# Patient Record
Sex: MALE | Race: WHITE | NOT HISPANIC OR LATINO
[De-identification: names, ages, dates, MRNs, and addresses within clinical notes are randomized per-mention and may not be internally consistent; named-entity substitution may affect disease eponyms.]

---

## 2017-05-02 ENCOUNTER — APPOINTMENT (OUTPATIENT)
Dept: GASTROENTEROLOGY | Facility: CLINIC | Age: 82
End: 2017-05-02

## 2017-05-02 VITALS
HEART RATE: 52 BPM | SYSTOLIC BLOOD PRESSURE: 137 MMHG | DIASTOLIC BLOOD PRESSURE: 57 MMHG | BODY MASS INDEX: 26.84 KG/M2 | RESPIRATION RATE: 16 BRPM | WEIGHT: 196 LBS | HEIGHT: 71.5 IN

## 2017-05-02 DIAGNOSIS — Z86.39 PERSONAL HISTORY OF OTHER ENDOCRINE, NUTRITIONAL AND METABOLIC DISEASE: ICD-10-CM

## 2017-05-02 DIAGNOSIS — Z87.39 PERSONAL HISTORY OF OTHER DISEASES OF THE MUSCULOSKELETAL SYSTEM AND CONNECTIVE TISSUE: ICD-10-CM

## 2017-05-02 DIAGNOSIS — A04.7 ENTEROCOLITIS DUE TO CLOSTRIDIUM DIFFICILE: ICD-10-CM

## 2017-05-02 DIAGNOSIS — K64.8 OTHER HEMORRHOIDS: ICD-10-CM

## 2017-05-02 DIAGNOSIS — I25.10 ATHEROSCLEROTIC HEART DISEASE OF NATIVE CORONARY ARTERY W/OUT ANGINA PECTORIS: ICD-10-CM

## 2017-05-02 DIAGNOSIS — Z86.79 PERSONAL HISTORY OF OTHER DISEASES OF THE CIRCULATORY SYSTEM: ICD-10-CM

## 2017-05-02 DIAGNOSIS — Z98.61 CORONARY ANGIOPLASTY STATUS: ICD-10-CM

## 2017-05-02 DIAGNOSIS — N40.0 BENIGN PROSTATIC HYPERPLASIA WITHOUT LOWER URINARY TRACT SYMPMS: ICD-10-CM

## 2017-05-02 RX ORDER — ISOSORBIDE MONONITRATE 60 MG/1
60 TABLET, EXTENDED RELEASE ORAL
Qty: 90 | Refills: 0 | Status: ACTIVE | COMMUNITY
Start: 2017-04-04

## 2017-05-02 RX ORDER — LEVOTHYROXINE SODIUM 75 UG/1
75 TABLET ORAL
Qty: 90 | Refills: 0 | Status: ACTIVE | COMMUNITY
Start: 2016-09-01

## 2017-05-02 RX ORDER — POLYETHYLENE GLYOCOL 3350, SODIUM CHLORIDE, SODIUM BICARBONATE AND POTASSIUM CHLORIDE 420; 11.2; 5.72; 1.48 G/4L; G/4L; G/4L; G/4L
420 POWDER, FOR SOLUTION NASOGASTRIC; ORAL
Qty: 1 | Refills: 0 | Status: ACTIVE | COMMUNITY
Start: 2017-05-02 | End: 1900-01-01

## 2017-05-02 RX ORDER — CLOPIDOGREL BISULFATE 75 MG/1
75 TABLET, FILM COATED ORAL
Qty: 90 | Refills: 0 | Status: ACTIVE | COMMUNITY
Start: 2016-11-21

## 2017-05-02 RX ORDER — OMEPRAZOLE 20 MG/1
20 CAPSULE, DELAYED RELEASE ORAL
Qty: 90 | Refills: 0 | Status: ACTIVE | COMMUNITY
Start: 2017-01-02

## 2017-05-02 RX ORDER — SIMVASTATIN 20 MG/1
20 TABLET, FILM COATED ORAL
Qty: 90 | Refills: 0 | Status: ACTIVE | COMMUNITY
Start: 2016-09-01

## 2017-05-02 RX ORDER — DONEPEZIL HYDROCHLORIDE 10 MG/1
10 TABLET ORAL
Qty: 90 | Refills: 0 | Status: ACTIVE | COMMUNITY
Start: 2017-01-02

## 2017-05-02 RX ORDER — TERAZOSIN 5 MG/1
5 CAPSULE ORAL
Qty: 90 | Refills: 0 | Status: ACTIVE | COMMUNITY
Start: 2017-02-18

## 2017-05-02 RX ORDER — RAMIPRIL 5 MG/1
5 CAPSULE ORAL
Qty: 90 | Refills: 0 | Status: ACTIVE | COMMUNITY
Start: 2016-11-21

## 2017-06-05 ENCOUNTER — OUTPATIENT (OUTPATIENT)
Dept: OUTPATIENT SERVICES | Facility: HOSPITAL | Age: 82
LOS: 1 days | End: 2017-06-05
Payer: MEDICARE

## 2017-06-05 DIAGNOSIS — I25.9 CHRONIC ISCHEMIC HEART DISEASE, UNSPECIFIED: Chronic | ICD-10-CM

## 2017-06-05 DIAGNOSIS — Z41.9 ENCOUNTER FOR PROCEDURE FOR PURPOSES OTHER THAN REMEDYING HEALTH STATE, UNSPECIFIED: Chronic | ICD-10-CM

## 2017-06-05 DIAGNOSIS — K92.1 MELENA: ICD-10-CM

## 2017-06-05 DIAGNOSIS — Z98.89 OTHER SPECIFIED POSTPROCEDURAL STATES: Chronic | ICD-10-CM

## 2017-06-05 DIAGNOSIS — Z87.2 PERSONAL HISTORY OF DISEASES OF THE SKIN AND SUBCUTANEOUS TISSUE: Chronic | ICD-10-CM

## 2017-06-05 PROCEDURE — 71020: CPT | Mod: 26

## 2017-06-20 ENCOUNTER — OUTPATIENT (OUTPATIENT)
Dept: OUTPATIENT SERVICES | Facility: HOSPITAL | Age: 82
LOS: 1 days | End: 2017-06-20
Payer: MEDICARE

## 2017-06-20 VITALS
SYSTOLIC BLOOD PRESSURE: 130 MMHG | WEIGHT: 202.16 LBS | TEMPERATURE: 98 F | HEART RATE: 44 BPM | RESPIRATION RATE: 16 BRPM | HEIGHT: 71 IN | DIASTOLIC BLOOD PRESSURE: 61 MMHG

## 2017-06-20 DIAGNOSIS — Z98.89 OTHER SPECIFIED POSTPROCEDURAL STATES: Chronic | ICD-10-CM

## 2017-06-20 DIAGNOSIS — K92.1 MELENA: ICD-10-CM

## 2017-06-20 DIAGNOSIS — I25.10 ATHEROSCLEROTIC HEART DISEASE OF NATIVE CORONARY ARTERY WITHOUT ANGINA PECTORIS: ICD-10-CM

## 2017-06-20 DIAGNOSIS — N40.0 BENIGN PROSTATIC HYPERPLASIA WITHOUT LOWER URINARY TRACT SYMPTOMS: ICD-10-CM

## 2017-06-20 DIAGNOSIS — E03.9 HYPOTHYROIDISM, UNSPECIFIED: ICD-10-CM

## 2017-06-20 DIAGNOSIS — Z87.2 PERSONAL HISTORY OF DISEASES OF THE SKIN AND SUBCUTANEOUS TISSUE: Chronic | ICD-10-CM

## 2017-06-20 DIAGNOSIS — Z41.9 ENCOUNTER FOR PROCEDURE FOR PURPOSES OTHER THAN REMEDYING HEALTH STATE, UNSPECIFIED: Chronic | ICD-10-CM

## 2017-06-20 DIAGNOSIS — K21.9 GASTRO-ESOPHAGEAL REFLUX DISEASE WITHOUT ESOPHAGITIS: ICD-10-CM

## 2017-06-20 DIAGNOSIS — I25.9 CHRONIC ISCHEMIC HEART DISEASE, UNSPECIFIED: Chronic | ICD-10-CM

## 2017-06-20 DIAGNOSIS — Z01.818 ENCOUNTER FOR OTHER PREPROCEDURAL EXAMINATION: ICD-10-CM

## 2017-06-20 LAB
ANION GAP SERPL CALC-SCNC: 13 MMOL/L — SIGNIFICANT CHANGE UP (ref 5–17)
APTT BLD: 28.1 SEC — SIGNIFICANT CHANGE UP (ref 27.5–37.4)
BUN SERPL-MCNC: 23 MG/DL — HIGH (ref 8–20)
CALCIUM SERPL-MCNC: 8.6 MG/DL — SIGNIFICANT CHANGE UP (ref 8.6–10.2)
CHLORIDE SERPL-SCNC: 104 MMOL/L — SIGNIFICANT CHANGE UP (ref 98–107)
CO2 SERPL-SCNC: 22 MMOL/L — SIGNIFICANT CHANGE UP (ref 22–29)
CREAT SERPL-MCNC: 0.98 MG/DL — SIGNIFICANT CHANGE UP (ref 0.5–1.3)
GLUCOSE SERPL-MCNC: 94 MG/DL — SIGNIFICANT CHANGE UP (ref 70–115)
HCT VFR BLD CALC: 37.4 % — LOW (ref 42–52)
HGB BLD-MCNC: 12.2 G/DL — LOW (ref 14–18)
INR BLD: 1.09 RATIO — SIGNIFICANT CHANGE UP (ref 0.88–1.16)
MCHC RBC-ENTMCNC: 30.7 PG — SIGNIFICANT CHANGE UP (ref 27–31)
MCHC RBC-ENTMCNC: 32.6 G/DL — SIGNIFICANT CHANGE UP (ref 32–36)
MCV RBC AUTO: 94.2 FL — HIGH (ref 80–94)
PLATELET # BLD AUTO: 135 K/UL — LOW (ref 150–400)
POTASSIUM SERPL-MCNC: 4.6 MMOL/L — SIGNIFICANT CHANGE UP (ref 3.5–5.3)
POTASSIUM SERPL-SCNC: 4.6 MMOL/L — SIGNIFICANT CHANGE UP (ref 3.5–5.3)
PROTHROM AB SERPL-ACNC: 12 SEC — SIGNIFICANT CHANGE UP (ref 9.8–12.7)
RBC # BLD: 3.97 M/UL — LOW (ref 4.6–6.2)
RBC # FLD: 13.5 % — SIGNIFICANT CHANGE UP (ref 11–15.6)
SODIUM SERPL-SCNC: 139 MMOL/L — SIGNIFICANT CHANGE UP (ref 135–145)
WBC # BLD: 7.2 K/UL — SIGNIFICANT CHANGE UP (ref 4.8–10.8)
WBC # FLD AUTO: 7.2 K/UL — SIGNIFICANT CHANGE UP (ref 4.8–10.8)

## 2017-06-20 PROCEDURE — 80048 BASIC METABOLIC PNL TOTAL CA: CPT

## 2017-06-20 PROCEDURE — 85027 COMPLETE CBC AUTOMATED: CPT

## 2017-06-20 PROCEDURE — 85610 PROTHROMBIN TIME: CPT

## 2017-06-20 PROCEDURE — G0463: CPT

## 2017-06-20 PROCEDURE — 85730 THROMBOPLASTIN TIME PARTIAL: CPT

## 2017-06-20 NOTE — H&P PST ADULT - PSH
Coronary artery disease  cardiac stents 2011  H/O cataract removal with insertion of prosthetic lens    H/O pilonidal cyst    H/O: hemorrhoidectomy    S/P appendectomy

## 2017-06-20 NOTE — H&P PST ADULT - ASSESSMENT
medications reviewed, instructions given on what medications to take and what not to take. Asked the patient to take the Blood pressure medication/ heart medication on DOP.

## 2017-06-20 NOTE — H&P PST ADULT - HISTORY OF PRESENT ILLNESS
88 year old male who states that he had a some bleeding with his bowel movement few months ago, was seen by the gastroenterologist in the office now scheduled for Colonoscopy

## 2017-06-20 NOTE — H&P PST ADULT - PMH
Anginal pain    BPH (benign prostatic hypertrophy)    Cataract    Coronary artery disease    Enlarged prostate    Heart attack    Hypothyroidism    Reflux    Stented coronary artery  x1  TIA (transient ischemic attack)

## 2017-06-20 NOTE — H&P PST ADULT - NSANTHOSAYNRD_GEN_A_CORE
No. MARIELA screening performed.  STOP BANG Legend: 0-2 = LOW Risk; 3-4 = INTERMEDIATE Risk; 5-8 = HIGH Risk

## 2017-06-23 ENCOUNTER — OUTPATIENT (OUTPATIENT)
Dept: OUTPATIENT SERVICES | Facility: HOSPITAL | Age: 82
LOS: 1 days | End: 2017-06-23
Payer: MEDICARE

## 2017-06-23 ENCOUNTER — APPOINTMENT (OUTPATIENT)
Dept: GASTROENTEROLOGY | Facility: GI CENTER | Age: 82
End: 2017-06-23

## 2017-06-23 DIAGNOSIS — Z98.89 OTHER SPECIFIED POSTPROCEDURAL STATES: Chronic | ICD-10-CM

## 2017-06-23 DIAGNOSIS — K92.1 MELENA: ICD-10-CM

## 2017-06-23 DIAGNOSIS — Z41.9 ENCOUNTER FOR PROCEDURE FOR PURPOSES OTHER THAN REMEDYING HEALTH STATE, UNSPECIFIED: Chronic | ICD-10-CM

## 2017-06-23 DIAGNOSIS — K64.8 OTHER HEMORRHOIDS: ICD-10-CM

## 2017-06-23 DIAGNOSIS — Z87.2 PERSONAL HISTORY OF DISEASES OF THE SKIN AND SUBCUTANEOUS TISSUE: Chronic | ICD-10-CM

## 2017-06-23 DIAGNOSIS — I25.9 CHRONIC ISCHEMIC HEART DISEASE, UNSPECIFIED: Chronic | ICD-10-CM

## 2017-06-23 DIAGNOSIS — Z86.010 PERSONAL HISTORY OF COLONIC POLYPS: ICD-10-CM

## 2017-06-23 DIAGNOSIS — K57.31 DIVERTICULOSIS OF LARGE INTESTINE W/OUT PERFORATION OR ABSCESS WITH BLEEDING: ICD-10-CM

## 2017-06-23 PROCEDURE — 45378 DIAGNOSTIC COLONOSCOPY: CPT

## 2017-08-10 ENCOUNTER — APPOINTMENT (OUTPATIENT)
Dept: DERMATOLOGY | Facility: CLINIC | Age: 82
End: 2017-08-10
Payer: MEDICARE

## 2017-08-10 PROCEDURE — 99213 OFFICE O/P EST LOW 20 MIN: CPT | Mod: 25

## 2017-08-10 PROCEDURE — 17003 DESTRUCT PREMALG LES 2-14: CPT

## 2017-08-10 PROCEDURE — 17000 DESTRUCT PREMALG LESION: CPT

## 2018-02-13 ENCOUNTER — APPOINTMENT (OUTPATIENT)
Dept: DERMATOLOGY | Facility: CLINIC | Age: 83
End: 2018-02-13
Payer: MEDICARE

## 2018-02-13 PROCEDURE — 99214 OFFICE O/P EST MOD 30 MIN: CPT

## 2018-08-21 ENCOUNTER — APPOINTMENT (OUTPATIENT)
Dept: DERMATOLOGY | Facility: CLINIC | Age: 83
End: 2018-08-21

## 2018-08-27 ENCOUNTER — EMERGENCY (EMERGENCY)
Facility: HOSPITAL | Age: 83
LOS: 1 days | Discharge: HOME | End: 2018-08-27
Attending: EMERGENCY MEDICINE
Payer: MEDICARE

## 2018-08-27 VITALS — HEIGHT: 72 IN | WEIGHT: 184.97 LBS

## 2018-08-27 VITALS
HEART RATE: 79 BPM | OXYGEN SATURATION: 96 % | SYSTOLIC BLOOD PRESSURE: 132 MMHG | DIASTOLIC BLOOD PRESSURE: 62 MMHG | TEMPERATURE: 98 F | RESPIRATION RATE: 18 BRPM

## 2018-08-27 DIAGNOSIS — I25.9 CHRONIC ISCHEMIC HEART DISEASE, UNSPECIFIED: Chronic | ICD-10-CM

## 2018-08-27 DIAGNOSIS — Z41.9 ENCOUNTER FOR PROCEDURE FOR PURPOSES OTHER THAN REMEDYING HEALTH STATE, UNSPECIFIED: Chronic | ICD-10-CM

## 2018-08-27 DIAGNOSIS — Z98.89 OTHER SPECIFIED POSTPROCEDURAL STATES: Chronic | ICD-10-CM

## 2018-08-27 DIAGNOSIS — Z87.2 PERSONAL HISTORY OF DISEASES OF THE SKIN AND SUBCUTANEOUS TISSUE: Chronic | ICD-10-CM

## 2018-08-27 LAB
ALBUMIN SERPL ELPH-MCNC: 3.4 G/DL — SIGNIFICANT CHANGE UP (ref 3.3–5.2)
ALP SERPL-CCNC: 61 U/L — SIGNIFICANT CHANGE UP (ref 40–120)
ALT FLD-CCNC: <5 U/L — SIGNIFICANT CHANGE UP
ANION GAP SERPL CALC-SCNC: 13 MMOL/L — SIGNIFICANT CHANGE UP (ref 5–17)
APTT BLD: 26.1 SEC — LOW (ref 27.5–37.4)
AST SERPL-CCNC: 15 U/L — SIGNIFICANT CHANGE UP
BASOPHILS # BLD AUTO: 0 K/UL — SIGNIFICANT CHANGE UP (ref 0–0.2)
BASOPHILS NFR BLD AUTO: 0.2 % — SIGNIFICANT CHANGE UP (ref 0–2)
BILIRUB SERPL-MCNC: 0.6 MG/DL — SIGNIFICANT CHANGE UP (ref 0.4–2)
BUN SERPL-MCNC: 21 MG/DL — HIGH (ref 8–20)
CALCIUM SERPL-MCNC: 8.5 MG/DL — LOW (ref 8.6–10.2)
CHLORIDE SERPL-SCNC: 100 MMOL/L — SIGNIFICANT CHANGE UP (ref 98–107)
CK SERPL-CCNC: 26 U/L — LOW (ref 30–200)
CO2 SERPL-SCNC: 23 MMOL/L — SIGNIFICANT CHANGE UP (ref 22–29)
CREAT SERPL-MCNC: 0.9 MG/DL — SIGNIFICANT CHANGE UP (ref 0.5–1.3)
EOSINOPHIL # BLD AUTO: 0.1 K/UL — SIGNIFICANT CHANGE UP (ref 0–0.5)
EOSINOPHIL NFR BLD AUTO: 0.6 % — SIGNIFICANT CHANGE UP (ref 0–6)
GLUCOSE SERPL-MCNC: 148 MG/DL — HIGH (ref 70–115)
HCT VFR BLD CALC: 32.1 % — LOW (ref 42–52)
HGB BLD-MCNC: 10.6 G/DL — LOW (ref 14–18)
INR BLD: 1.19 RATIO — HIGH (ref 0.88–1.16)
LYMPHOCYTES # BLD AUTO: 1.2 K/UL — SIGNIFICANT CHANGE UP (ref 1–4.8)
LYMPHOCYTES # BLD AUTO: 8.4 % — LOW (ref 20–55)
MCHC RBC-ENTMCNC: 31.4 PG — HIGH (ref 27–31)
MCHC RBC-ENTMCNC: 33 G/DL — SIGNIFICANT CHANGE UP (ref 32–36)
MCV RBC AUTO: 95 FL — HIGH (ref 80–94)
MONOCYTES # BLD AUTO: 1 K/UL — HIGH (ref 0–0.8)
MONOCYTES NFR BLD AUTO: 6.9 % — SIGNIFICANT CHANGE UP (ref 3–10)
NEUTROPHILS # BLD AUTO: 12.5 K/UL — HIGH (ref 1.8–8)
NEUTROPHILS NFR BLD AUTO: 83.6 % — HIGH (ref 37–73)
PLATELET # BLD AUTO: 270 K/UL — SIGNIFICANT CHANGE UP (ref 150–400)
POTASSIUM SERPL-MCNC: 4.2 MMOL/L — SIGNIFICANT CHANGE UP (ref 3.5–5.3)
POTASSIUM SERPL-SCNC: 4.2 MMOL/L — SIGNIFICANT CHANGE UP (ref 3.5–5.3)
PROT SERPL-MCNC: 6.4 G/DL — LOW (ref 6.6–8.7)
PROTHROM AB SERPL-ACNC: 13.1 SEC — HIGH (ref 9.8–12.7)
RBC # BLD: 3.38 M/UL — LOW (ref 4.6–6.2)
RBC # FLD: 13.6 % — SIGNIFICANT CHANGE UP (ref 11–15.6)
SODIUM SERPL-SCNC: 136 MMOL/L — SIGNIFICANT CHANGE UP (ref 135–145)
TROPONIN T SERPL-MCNC: <0.01 NG/ML — SIGNIFICANT CHANGE UP (ref 0–0.06)
WBC # BLD: 14.9 K/UL — HIGH (ref 4.8–10.8)
WBC # FLD AUTO: 14.9 K/UL — HIGH (ref 4.8–10.8)

## 2018-08-27 PROCEDURE — 71275 CT ANGIOGRAPHY CHEST: CPT

## 2018-08-27 PROCEDURE — 36415 COLL VENOUS BLD VENIPUNCTURE: CPT

## 2018-08-27 PROCEDURE — 99285 EMERGENCY DEPT VISIT HI MDM: CPT

## 2018-08-27 PROCEDURE — 99284 EMERGENCY DEPT VISIT MOD MDM: CPT | Mod: 25

## 2018-08-27 PROCEDURE — 71045 X-RAY EXAM CHEST 1 VIEW: CPT

## 2018-08-27 PROCEDURE — 85730 THROMBOPLASTIN TIME PARTIAL: CPT

## 2018-08-27 PROCEDURE — 84484 ASSAY OF TROPONIN QUANT: CPT

## 2018-08-27 PROCEDURE — 93005 ELECTROCARDIOGRAM TRACING: CPT

## 2018-08-27 PROCEDURE — 85027 COMPLETE CBC AUTOMATED: CPT

## 2018-08-27 PROCEDURE — 85610 PROTHROMBIN TIME: CPT

## 2018-08-27 PROCEDURE — 82550 ASSAY OF CK (CPK): CPT

## 2018-08-27 PROCEDURE — 71275 CT ANGIOGRAPHY CHEST: CPT | Mod: 26

## 2018-08-27 PROCEDURE — 71045 X-RAY EXAM CHEST 1 VIEW: CPT | Mod: 26

## 2018-08-27 PROCEDURE — 80053 COMPREHEN METABOLIC PANEL: CPT

## 2018-08-27 PROCEDURE — 93010 ELECTROCARDIOGRAM REPORT: CPT

## 2018-08-27 RX ORDER — DONEPEZIL HYDROCHLORIDE 10 MG/1
1 TABLET, FILM COATED ORAL
Qty: 0 | Refills: 0 | COMMUNITY

## 2018-08-27 NOTE — ED ADULT TRIAGE NOTE - CHIEF COMPLAINT QUOTE
pt daughter report pt had a pacemaker placed last week at [facility]. pt c/o right sided CP for past 2 days.

## 2018-08-27 NOTE — ED ADULT NURSE NOTE - PSH
Coronary artery disease  cardiac stents 2011  Elective surgery  pt had bladder mass removed apporoximayly 6 weeks ago by Dr. Mccord  H/O cataract removal with insertion of prosthetic lens    H/O pilonidal cyst    H/O: hemorrhoidectomy    S/P appendectomy

## 2018-08-27 NOTE — ED PROVIDER NOTE - OBJECTIVE STATEMENT
A 89 year old male pt s/p AICD placement at [facility] 1 week ago presents to the ED c/o CP. Pt states that for the past two days he has had right sided CP with breathing. He has not had similar pain in the past and has no prior hx of DVT or PE. Dr. Connors is his cardiologist and Dr. Espinal is his PMD. denies fever. denies HA or neck pain. no sob. no abd pain. no n/v/d. no urinary f/u/d. no back pain. no motor or sensory deficits. denies illicit drug use. no recent travel. no rash. no other acute issues symptoms or concerns

## 2018-08-27 NOTE — ED ADULT NURSE NOTE - OBJECTIVE STATEMENT
Pt states "I have chest pain when I exert myself, I'm 89 years old so everything is exerting for me", pain x 1 day, pt AOx4, denies radiation of pain, denies n/v, recently had pacemaker placed a couple weeks ago w/out complication, resp even and unlabored, denies syncope/weakness

## 2018-08-27 NOTE — ED ADULT NURSE NOTE - CHIEF COMPLAINT QUOTE
pt daughter report pt had a pacemaker placed last week at [facility]. pt c/o right sided CP for past 2 days.

## 2018-08-27 NOTE — ED ADULT NURSE NOTE - NSIMPLEMENTINTERV_GEN_ALL_ED
Implemented All Universal Safety Interventions:  [place] to call system. Call bell, personal items and telephone within reach. Instruct patient to call for assistance. Room bathroom lighting operational. Non-slip footwear when patient is off stretcher. Physically safe environment: no spills, clutter or unnecessary equipment. Stretcher in lowest position, wheels locked, appropriate side rails in place.

## 2018-08-28 PROBLEM — E03.9 HYPOTHYROIDISM, UNSPECIFIED: Chronic | Status: ACTIVE | Noted: 2017-06-20

## 2019-03-27 ENCOUNTER — EMERGENCY (EMERGENCY)
Facility: HOSPITAL | Age: 84
LOS: 1 days | Discharge: HOME | End: 2019-03-27
Attending: EMERGENCY MEDICINE
Payer: MEDICARE

## 2019-03-27 VITALS
DIASTOLIC BLOOD PRESSURE: 78 MMHG | RESPIRATION RATE: 18 BRPM | SYSTOLIC BLOOD PRESSURE: 110 MMHG | WEIGHT: 160.06 LBS | OXYGEN SATURATION: 99 % | HEART RATE: 78 BPM | TEMPERATURE: 98 F

## 2019-03-27 VITALS
DIASTOLIC BLOOD PRESSURE: 83 MMHG | SYSTOLIC BLOOD PRESSURE: 147 MMHG | RESPIRATION RATE: 18 BRPM | HEART RATE: 99 BPM | OXYGEN SATURATION: 99 %

## 2019-03-27 DIAGNOSIS — Z41.9 ENCOUNTER FOR PROCEDURE FOR PURPOSES OTHER THAN REMEDYING HEALTH STATE, UNSPECIFIED: Chronic | ICD-10-CM

## 2019-03-27 DIAGNOSIS — I25.9 CHRONIC ISCHEMIC HEART DISEASE, UNSPECIFIED: Chronic | ICD-10-CM

## 2019-03-27 DIAGNOSIS — Z98.89 OTHER SPECIFIED POSTPROCEDURAL STATES: Chronic | ICD-10-CM

## 2019-03-27 DIAGNOSIS — Z87.2 PERSONAL HISTORY OF DISEASES OF THE SKIN AND SUBCUTANEOUS TISSUE: Chronic | ICD-10-CM

## 2019-03-27 LAB
ANION GAP SERPL CALC-SCNC: 10 MMOL/L — SIGNIFICANT CHANGE UP (ref 5–17)
APTT BLD: 28.3 SEC — SIGNIFICANT CHANGE UP (ref 27.5–36.3)
BUN SERPL-MCNC: 22 MG/DL — HIGH (ref 8–20)
CALCIUM SERPL-MCNC: 8.6 MG/DL — SIGNIFICANT CHANGE UP (ref 8.6–10.2)
CHLORIDE SERPL-SCNC: 108 MMOL/L — HIGH (ref 98–107)
CO2 SERPL-SCNC: 24 MMOL/L — SIGNIFICANT CHANGE UP (ref 22–29)
CREAT SERPL-MCNC: 0.99 MG/DL — SIGNIFICANT CHANGE UP (ref 0.5–1.3)
GLUCOSE SERPL-MCNC: 94 MG/DL — SIGNIFICANT CHANGE UP (ref 70–115)
HCT VFR BLD CALC: 43.1 % — SIGNIFICANT CHANGE UP (ref 42–52)
HGB BLD-MCNC: 14 G/DL — SIGNIFICANT CHANGE UP (ref 14–18)
INR BLD: 1.03 RATIO — SIGNIFICANT CHANGE UP (ref 0.88–1.16)
MCHC RBC-ENTMCNC: 30.8 PG — SIGNIFICANT CHANGE UP (ref 27–31)
MCHC RBC-ENTMCNC: 32.5 G/DL — SIGNIFICANT CHANGE UP (ref 32–36)
MCV RBC AUTO: 94.7 FL — HIGH (ref 80–94)
PLATELET # BLD AUTO: 127 K/UL — LOW (ref 150–400)
POTASSIUM SERPL-MCNC: 4.7 MMOL/L — SIGNIFICANT CHANGE UP (ref 3.5–5.3)
POTASSIUM SERPL-SCNC: 4.7 MMOL/L — SIGNIFICANT CHANGE UP (ref 3.5–5.3)
PROTHROM AB SERPL-ACNC: 11.8 SEC — SIGNIFICANT CHANGE UP (ref 10–12.9)
RBC # BLD: 4.55 M/UL — LOW (ref 4.6–6.2)
RBC # FLD: 14.4 % — SIGNIFICANT CHANGE UP (ref 11–15.6)
SODIUM SERPL-SCNC: 142 MMOL/L — SIGNIFICANT CHANGE UP (ref 135–145)
WBC # BLD: 8.8 K/UL — SIGNIFICANT CHANGE UP (ref 4.8–10.8)
WBC # FLD AUTO: 8.8 K/UL — SIGNIFICANT CHANGE UP (ref 4.8–10.8)

## 2019-03-27 PROCEDURE — 36415 COLL VENOUS BLD VENIPUNCTURE: CPT

## 2019-03-27 PROCEDURE — 70450 CT HEAD/BRAIN W/O DYE: CPT

## 2019-03-27 PROCEDURE — 85730 THROMBOPLASTIN TIME PARTIAL: CPT

## 2019-03-27 PROCEDURE — 71101 X-RAY EXAM UNILAT RIBS/CHEST: CPT | Mod: 26

## 2019-03-27 PROCEDURE — 80048 BASIC METABOLIC PNL TOTAL CA: CPT

## 2019-03-27 PROCEDURE — 71101 X-RAY EXAM UNILAT RIBS/CHEST: CPT

## 2019-03-27 PROCEDURE — 73030 X-RAY EXAM OF SHOULDER: CPT | Mod: 26,LT

## 2019-03-27 PROCEDURE — 73030 X-RAY EXAM OF SHOULDER: CPT

## 2019-03-27 PROCEDURE — 12051 INTMD RPR FACE/MM 2.5 CM/<: CPT

## 2019-03-27 PROCEDURE — 85610 PROTHROMBIN TIME: CPT

## 2019-03-27 PROCEDURE — 99284 EMERGENCY DEPT VISIT MOD MDM: CPT | Mod: 25

## 2019-03-27 PROCEDURE — 71250 CT THORAX DX C-: CPT | Mod: 26

## 2019-03-27 PROCEDURE — 70450 CT HEAD/BRAIN W/O DYE: CPT | Mod: 26

## 2019-03-27 PROCEDURE — 85027 COMPLETE CBC AUTOMATED: CPT

## 2019-03-27 PROCEDURE — 12011 RPR F/E/E/N/L/M 2.5 CM/<: CPT

## 2019-03-27 PROCEDURE — 71250 CT THORAX DX C-: CPT

## 2019-03-27 NOTE — ED STATDOCS - NEUROLOGICAL, MLM
A&Ox3, sensation is normal and strength is normal, no pronator drift, 5/5  strength, CN II-XII intact

## 2019-03-27 NOTE — ED STATDOCS - MUSCULOSKELETAL, MLM
Moving all extremities spontaneously. no midline tenderness. no deformities noted. posterior L rib tenderness, near 10th rib. L shoulder joint tenderness.

## 2019-03-27 NOTE — ED STATDOCS - CLINICAL SUMMARY MEDICAL DECISION MAKING FREE TEXT BOX
89 y/o M on plavix s/p fall, hitting L-sided of of head, no LOC, no obvious physical findings, other than blood in ear canal, tenderness to shoulder and ribs, investigate with labs, x-ray, and CT, pt agreeable and in no pain at this time.

## 2019-03-27 NOTE — ED ADULT NURSE NOTE - OBJECTIVE STATEMENT
Assumed pt care at 1200. Pt a&ox4 c/o trip and fall yesterday, c/o pain to r upper chest with inspirations, has visible lac to r ear, no acute s/s of respiratory distress noted or reported at this time, will continue to monitor

## 2019-03-27 NOTE — ED STATDOCS - PROGRESS NOTE DETAILS
Results noted and findings d/w pt. Pt seen and evaluated. Pt reports mechanical fall last night hitting left side head and chest. Left ear cleaned with small avulsion of skin to antihelix. Bleeding controlled and Dermabond applied. Pt with left sided rib tenderness to anterior rib 10 and anterior lateral rib 11-12. No crepitus appreciated. XR difficult to eval rib fx. Will CT Chest for further evaluation. Abd soft NT/ND/NG. No cspine, tspine or lspine ttp. Will continue to observe

## 2019-03-27 NOTE — ED STATDOCS - ENMT, MLM
Airway patent. Nasal mucosa clear.  Mouth with normal mucosa.  bleeding inside L ear canal.  Neck supple, FROM. Airway patent. Nasal mucosa clear.  Mouth with normal mucosa.  bleeding inside L ear canal, L ear drum with blood, dried blood to outer ear, no active bleeding, non tender..  Neck supple, FROM.

## 2019-03-27 NOTE — ED STATDOCS - CARE PLAN
Principal Discharge DX:	Rib fractures  Secondary Diagnosis:	Laceration of ear  Secondary Diagnosis:	Head contusion

## 2019-03-27 NOTE — ED STATDOCS - ATTENDING CONTRIBUTION TO CARE
I, Annalee Zhang, performed the initial face to face bedside interview with this patient regarding history of present illness, review of symptoms and relevant past medical, social and family history.  I completed an independent physical examination.  I was the initial provider who evaluated this patient. I have signed out the follow up of any pending tests (i.e. labs, radiological studies) to the ACP.  I have communicated the patient’s plan of care and disposition with the ACP.  The history, relevant review of systems, past medical and surgical history, medical decision making, and physical examination was documented by the scribe in my presence and I attest to the accuracy of the documentation.

## 2019-03-27 NOTE — ED ADULT TRIAGE NOTE - CHIEF COMPLAINT QUOTE
Patient states he was bending over to pick some thing up yesterday, fell onto left ear/head, denies LOC or blood thinners

## 2019-03-27 NOTE — ED STATDOCS - OBJECTIVE STATEMENT
89 y/o M pt with PMHx BPH, CAD, MI, cardiac stent, TIA, hypothyroidism, bladder mass removal, appendectomy presents to the ED s/p falling last night.  Pt states last night he was bending down to pick something up when he fell over, landed on the ground on his L-side, notes bleeding to his L ear after the fall.  He notes L-sided rib pain and L shoulder pain since his fall.  Pt lives at home with his daughter, states his fall was unwitnessed.  Pt is not sure what medications he takes, per pt's old records he takes Plavix.  Denies LOC, N/V, back pain.  No further acute complaints at this time.  PMD: Dr. Cowan

## 2019-03-30 ENCOUNTER — EMERGENCY (EMERGENCY)
Facility: HOSPITAL | Age: 84
LOS: 1 days | End: 2019-03-30
Attending: EMERGENCY MEDICINE
Payer: MEDICARE

## 2019-03-30 VITALS
HEART RATE: 76 BPM | TEMPERATURE: 97 F | DIASTOLIC BLOOD PRESSURE: 62 MMHG | WEIGHT: 160.06 LBS | HEIGHT: 71 IN | OXYGEN SATURATION: 96 % | SYSTOLIC BLOOD PRESSURE: 139 MMHG

## 2019-03-30 DIAGNOSIS — Z98.89 OTHER SPECIFIED POSTPROCEDURAL STATES: Chronic | ICD-10-CM

## 2019-03-30 DIAGNOSIS — I25.9 CHRONIC ISCHEMIC HEART DISEASE, UNSPECIFIED: Chronic | ICD-10-CM

## 2019-03-30 DIAGNOSIS — Z41.9 ENCOUNTER FOR PROCEDURE FOR PURPOSES OTHER THAN REMEDYING HEALTH STATE, UNSPECIFIED: Chronic | ICD-10-CM

## 2019-03-30 DIAGNOSIS — Z87.2 PERSONAL HISTORY OF DISEASES OF THE SKIN AND SUBCUTANEOUS TISSUE: Chronic | ICD-10-CM

## 2019-03-30 PROCEDURE — 99282 EMERGENCY DEPT VISIT SF MDM: CPT

## 2019-03-30 PROCEDURE — 99283 EMERGENCY DEPT VISIT LOW MDM: CPT

## 2019-03-30 NOTE — ED PROVIDER NOTE - CLINICAL SUMMARY MEDICAL DECISION MAKING FREE TEXT BOX
89 yo male c/o ear lobe bleeding. Here 3 days ago s/p fall, Dermabond applied to left ear lobe at the time. Wound is clean, no obvious signs of infection. Given time since laceration, will gently clean, apply Bacitracin and dressing.

## 2019-03-30 NOTE — ED PROVIDER NOTE - PROGRESS NOTE DETAILS
VIRAL Smith: Wound cleansed using water, Bacitracin and dressing applied. VIRAL Smith: Patient/guardian provided ample opportunity to ask questions which were answered to the fullest extent. Patient/guardian verbalized understanding and agreement of plan.

## 2019-03-30 NOTE — ED ADULT TRIAGE NOTE - CHIEF COMPLAINT QUOTE
Patient arrived to ED today with c/o left ear bleeding.  Patient states he was seen and treated in ED 3 days ago.  Patient states he was derma-bond placed to his ear now he has bleeding.

## 2019-03-30 NOTE — ED PROVIDER NOTE - PHYSICAL EXAMINATION
General: In NAD, non-toxic appearing; well nourished.  Skin: Laceration noted to left ear lobe. Not actively bleeding, clotted blood noted. Clean.  Cardio: No lifts, heaves, visible pulsations. No thrills. Rate and rhythm regular, S1 & S2 clear. No audible murmur, gallop, or rub.  Resp: Normal AP to lateral diameter, symmetrical excursion b/l. Breath sounds vesicular, symmetrical and without rales, rhonchi or wheezing b/l.  Neuro: A&Ox3. General: In NAD, non-toxic appearing; well nourished.  Skin: Laceration noted to left ear lobe. Not actively bleeding, clotted blood noted. Clean. No obvious Dermabond visualized.   Cardio: No lifts, heaves, visible pulsations. No thrills. Rate and rhythm regular, S1 & S2 clear. No audible murmur, gallop, or rub.  Resp: Normal AP to lateral diameter, symmetrical excursion b/l. Breath sounds vesicular, symmetrical and without rales, rhonchi or wheezing b/l.  Neuro: A&Ox3.

## 2019-03-30 NOTE — ED PROVIDER NOTE - ATTENDING CONTRIBUTION TO CARE
seen with acp  bleeding from left ear lobe  had a laceraton several days ago which was dermabonded  wound cleaned and bleeding controlled  agree with acps assessment hx andphysical

## 2019-03-30 NOTE — ED PROVIDER NOTE - OBJECTIVE STATEMENT
89 yo male c/o ear lobe bleeding. Reports here 3 days ago s/p fall, Dermabond applied to left ear lobe at the time. States has been cautious about laying on ear, but today began to bleed again. No further complaints at this time.   Denies fever/chills, headache, dizziness, chest pain, SOB.  PCP: Alina 89 yo male c/o ear lobe bleeding. Reports here 3 days ago s/p fall, Dermabond applied to left ear lobe at the time. States has been cautious about laying on ear, but today began to bleed again. Pt on blood thinners. No further complaints at this time.   Denies fever/chills, headache, dizziness, chest pain, SOB.  PCP: Alina

## 2019-04-27 ENCOUNTER — EMERGENCY (EMERGENCY)
Facility: HOSPITAL | Age: 84
LOS: 1 days | End: 2019-04-27
Attending: EMERGENCY MEDICINE
Payer: MEDICARE

## 2019-04-27 VITALS
TEMPERATURE: 98 F | HEIGHT: 72 IN | OXYGEN SATURATION: 97 % | HEART RATE: 82 BPM | SYSTOLIC BLOOD PRESSURE: 100 MMHG | WEIGHT: 160.06 LBS | DIASTOLIC BLOOD PRESSURE: 70 MMHG | RESPIRATION RATE: 18 BRPM

## 2019-04-27 VITALS
SYSTOLIC BLOOD PRESSURE: 111 MMHG | RESPIRATION RATE: 18 BRPM | OXYGEN SATURATION: 98 % | DIASTOLIC BLOOD PRESSURE: 62 MMHG | HEART RATE: 73 BPM

## 2019-04-27 DIAGNOSIS — Z87.2 PERSONAL HISTORY OF DISEASES OF THE SKIN AND SUBCUTANEOUS TISSUE: Chronic | ICD-10-CM

## 2019-04-27 DIAGNOSIS — I25.9 CHRONIC ISCHEMIC HEART DISEASE, UNSPECIFIED: Chronic | ICD-10-CM

## 2019-04-27 DIAGNOSIS — Z98.89 OTHER SPECIFIED POSTPROCEDURAL STATES: Chronic | ICD-10-CM

## 2019-04-27 DIAGNOSIS — Z41.9 ENCOUNTER FOR PROCEDURE FOR PURPOSES OTHER THAN REMEDYING HEALTH STATE, UNSPECIFIED: Chronic | ICD-10-CM

## 2019-04-27 LAB
ALBUMIN SERPL ELPH-MCNC: 3.9 G/DL — SIGNIFICANT CHANGE UP (ref 3.3–5.2)
ALP SERPL-CCNC: 93 U/L — SIGNIFICANT CHANGE UP (ref 40–120)
ALT FLD-CCNC: 8 U/L — SIGNIFICANT CHANGE UP
ANION GAP SERPL CALC-SCNC: 13 MMOL/L — SIGNIFICANT CHANGE UP (ref 5–17)
ANION GAP SERPL CALC-SCNC: 13 MMOL/L — SIGNIFICANT CHANGE UP (ref 5–17)
APPEARANCE UR: CLEAR — SIGNIFICANT CHANGE UP
AST SERPL-CCNC: 52 U/L — HIGH
BACTERIA # UR AUTO: ABNORMAL
BASOPHILS # BLD AUTO: 0 K/UL — SIGNIFICANT CHANGE UP (ref 0–0.2)
BASOPHILS NFR BLD AUTO: 0.2 % — SIGNIFICANT CHANGE UP (ref 0–2)
BILIRUB SERPL-MCNC: 1.3 MG/DL — SIGNIFICANT CHANGE UP (ref 0.4–2)
BILIRUB UR-MCNC: NEGATIVE — SIGNIFICANT CHANGE UP
BUN SERPL-MCNC: 22 MG/DL — HIGH (ref 8–20)
BUN SERPL-MCNC: 29 MG/DL — HIGH (ref 8–20)
CALCIUM SERPL-MCNC: 8.8 MG/DL — SIGNIFICANT CHANGE UP (ref 8.6–10.2)
CALCIUM SERPL-MCNC: 9 MG/DL — SIGNIFICANT CHANGE UP (ref 8.6–10.2)
CHLORIDE SERPL-SCNC: 104 MMOL/L — SIGNIFICANT CHANGE UP (ref 98–107)
CHLORIDE SERPL-SCNC: 108 MMOL/L — HIGH (ref 98–107)
CO2 SERPL-SCNC: 18 MMOL/L — LOW (ref 22–29)
CO2 SERPL-SCNC: 21 MMOL/L — LOW (ref 22–29)
COLOR SPEC: YELLOW — SIGNIFICANT CHANGE UP
CREAT SERPL-MCNC: 0.95 MG/DL — SIGNIFICANT CHANGE UP (ref 0.5–1.3)
CREAT SERPL-MCNC: 1.04 MG/DL — SIGNIFICANT CHANGE UP (ref 0.5–1.3)
DIFF PNL FLD: ABNORMAL
EOSINOPHIL # BLD AUTO: 0.1 K/UL — SIGNIFICANT CHANGE UP (ref 0–0.5)
EOSINOPHIL NFR BLD AUTO: 1.1 % — SIGNIFICANT CHANGE UP (ref 0–5)
EPI CELLS # UR: SIGNIFICANT CHANGE UP
GLUCOSE SERPL-MCNC: 100 MG/DL — SIGNIFICANT CHANGE UP (ref 70–115)
GLUCOSE SERPL-MCNC: 81 MG/DL — SIGNIFICANT CHANGE UP (ref 70–115)
GLUCOSE UR QL: NEGATIVE MG/DL — SIGNIFICANT CHANGE UP
HCT VFR BLD CALC: 45.7 % — SIGNIFICANT CHANGE UP (ref 42–52)
HGB BLD-MCNC: 15.6 G/DL — SIGNIFICANT CHANGE UP (ref 14–18)
KETONES UR-MCNC: ABNORMAL
LEUKOCYTE ESTERASE UR-ACNC: ABNORMAL
LYMPHOCYTES # BLD AUTO: 19.1 % — LOW (ref 20–55)
LYMPHOCYTES # BLD AUTO: 2.1 K/UL — SIGNIFICANT CHANGE UP (ref 1–4.8)
MCHC RBC-ENTMCNC: 31.6 PG — HIGH (ref 27–31)
MCHC RBC-ENTMCNC: 34.1 G/DL — SIGNIFICANT CHANGE UP (ref 32–36)
MCV RBC AUTO: 92.7 FL — SIGNIFICANT CHANGE UP (ref 80–94)
MONOCYTES # BLD AUTO: 1 K/UL — HIGH (ref 0–0.8)
MONOCYTES NFR BLD AUTO: 9.4 % — SIGNIFICANT CHANGE UP (ref 3–10)
NEUTROPHILS # BLD AUTO: 7.7 K/UL — SIGNIFICANT CHANGE UP (ref 1.8–8)
NEUTROPHILS NFR BLD AUTO: 69.9 % — SIGNIFICANT CHANGE UP (ref 37–73)
NITRITE UR-MCNC: NEGATIVE — SIGNIFICANT CHANGE UP
PH UR: 5 — SIGNIFICANT CHANGE UP (ref 5–8)
PLATELET # BLD AUTO: 134 K/UL — LOW (ref 150–400)
POTASSIUM SERPL-MCNC: 4.3 MMOL/L — SIGNIFICANT CHANGE UP (ref 3.5–5.3)
POTASSIUM SERPL-MCNC: 4.4 MMOL/L — SIGNIFICANT CHANGE UP (ref 3.5–5.3)
POTASSIUM SERPL-SCNC: 4.3 MMOL/L — SIGNIFICANT CHANGE UP (ref 3.5–5.3)
POTASSIUM SERPL-SCNC: 4.4 MMOL/L — SIGNIFICANT CHANGE UP (ref 3.5–5.3)
PROT SERPL-MCNC: 7.1 G/DL — SIGNIFICANT CHANGE UP (ref 6.6–8.7)
PROT UR-MCNC: NEGATIVE MG/DL — SIGNIFICANT CHANGE UP
RBC # BLD: 4.93 M/UL — SIGNIFICANT CHANGE UP (ref 4.6–6.2)
RBC # FLD: 14.2 % — SIGNIFICANT CHANGE UP (ref 11–15.6)
RBC CASTS # UR COMP ASSIST: SIGNIFICANT CHANGE UP /HPF (ref 0–4)
SODIUM SERPL-SCNC: 138 MMOL/L — SIGNIFICANT CHANGE UP (ref 135–145)
SODIUM SERPL-SCNC: 139 MMOL/L — SIGNIFICANT CHANGE UP (ref 135–145)
SP GR SPEC: 1.01 — SIGNIFICANT CHANGE UP (ref 1.01–1.02)
UROBILINOGEN FLD QL: NEGATIVE MG/DL — SIGNIFICANT CHANGE UP
WBC # BLD: 11 K/UL — HIGH (ref 4.8–10.8)
WBC # FLD AUTO: 11 K/UL — HIGH (ref 4.8–10.8)
WBC UR QL: SIGNIFICANT CHANGE UP

## 2019-04-27 PROCEDURE — 36415 COLL VENOUS BLD VENIPUNCTURE: CPT

## 2019-04-27 PROCEDURE — 99284 EMERGENCY DEPT VISIT MOD MDM: CPT | Mod: 25

## 2019-04-27 PROCEDURE — 74177 CT ABD & PELVIS W/CONTRAST: CPT

## 2019-04-27 PROCEDURE — 74022 RADEX COMPL AQT ABD SERIES: CPT | Mod: 26

## 2019-04-27 PROCEDURE — 80048 BASIC METABOLIC PNL TOTAL CA: CPT

## 2019-04-27 PROCEDURE — 80053 COMPREHEN METABOLIC PANEL: CPT

## 2019-04-27 PROCEDURE — 81001 URINALYSIS AUTO W/SCOPE: CPT

## 2019-04-27 PROCEDURE — 87493 C DIFF AMPLIFIED PROBE: CPT

## 2019-04-27 PROCEDURE — 74022 RADEX COMPL AQT ABD SERIES: CPT

## 2019-04-27 PROCEDURE — 74177 CT ABD & PELVIS W/CONTRAST: CPT | Mod: 26

## 2019-04-27 PROCEDURE — 85027 COMPLETE CBC AUTOMATED: CPT

## 2019-04-27 RX ORDER — SODIUM CHLORIDE 9 MG/ML
1000 INJECTION INTRAMUSCULAR; INTRAVENOUS; SUBCUTANEOUS ONCE
Qty: 0 | Refills: 0 | Status: COMPLETED | OUTPATIENT
Start: 2019-04-27 | End: 2019-04-27

## 2019-04-27 RX ORDER — SODIUM CHLORIDE 9 MG/ML
3 INJECTION INTRAMUSCULAR; INTRAVENOUS; SUBCUTANEOUS ONCE
Qty: 0 | Refills: 0 | Status: COMPLETED | OUTPATIENT
Start: 2019-04-27 | End: 2019-04-27

## 2019-04-27 RX ADMIN — SODIUM CHLORIDE 3 MILLILITER(S): 9 INJECTION INTRAMUSCULAR; INTRAVENOUS; SUBCUTANEOUS at 04:05

## 2019-04-27 RX ADMIN — SODIUM CHLORIDE 500 MILLILITER(S): 9 INJECTION INTRAMUSCULAR; INTRAVENOUS; SUBCUTANEOUS at 04:05

## 2019-04-27 RX ADMIN — SODIUM CHLORIDE 1000 MILLILITER(S): 9 INJECTION INTRAMUSCULAR; INTRAVENOUS; SUBCUTANEOUS at 12:39

## 2019-04-27 NOTE — ED ADULT NURSE NOTE - NSIMPLEMENTINTERV_GEN_ALL_ED
Implemented All Fall Risk Interventions:  [place] to call system. Call bell, personal items and telephone within reach. Instruct patient to call for assistance. Room bathroom lighting operational. Non-slip footwear when patient is off stretcher. Physically safe environment: no spills, clutter or unnecessary equipment. Stretcher in lowest position, wheels locked, appropriate side rails in place. Provide visual cue, wrist band, yellow gown, etc. Monitor gait and stability. Monitor for mental status changes and reorient to person, place, and time. Review medications for side effects contributing to fall risk. Reinforce activity limits and safety measures with patient and family.

## 2019-04-27 NOTE — ED ADULT NURSE NOTE - OBJECTIVE STATEMENT
Assumed pt care, pt sitting in stretcher no acute distress noted, pt A+Ox3. Pt states he does not know why hes here, he was sent by his daughter for r/o c.diff, pt with diarrhea, hx of c.diff. Pt denies pain, abdomen soft, nontender, and nondistended. Pt with bowel sounds present all four quadrants. Pt educated to provide stool sample and urine sample.

## 2019-04-27 NOTE — ED PROVIDER NOTE - PROGRESS NOTE DETAILS
pt feels well and abd soft and no pain and formed bm  cdiff cancelled by lab because formed stool  pt is afe for d/c at this time daughter with pt and she says she is not comfortable talikg pt home because she is not always home and pt fell 2 weeks ago pt seen by social work and daughter given resources and now will take pt home and f/u pmd and GI

## 2019-04-27 NOTE — ED ADULT NURSE REASSESSMENT NOTE - NS ED NURSE REASSESS COMMENT FT1
ANG at the bedside upon pt's daughter request , awaiting dispo
Pt sitting comfortably in stretcher watching TV, remains A+Ox3, denies pain or discomfort. Pt awaiting lab results for urine and stool at this time. Pt educated on plan of care, pt expresses understanding to RN. RN will continue to update pt throughout ED stay.
Late entry : Pt received in the stretcher  resting comfortable, no distress noted, denies any abd pain , VSS, awaiting stool sample to get resulted , will continue to monitor

## 2019-04-27 NOTE — ED ADULT NURSE REASSESSMENT NOTE - COMFORT CARE
side rails up/treatment delay explained/wait time explained/warm blanket provided/plan of care explained/repositioned

## 2019-04-27 NOTE — CHART NOTE - NSCHARTNOTEFT_GEN_A_CORE
Yoshi: p/c from Dr Andrea requesting worker to speak with pt and pt's dghtr at bedside about home care needs. Worker met with pt (alert and orientedx3) and his dghtr Clarissa at bedside, pt authorized worker to speak with his dghtr. Reportedly, Clarissa built an extension at her house for pt to live there after her mother passed and he returned from [facility]. Per Clarissa pt is independent in his ADLs has a cane and a RW however, pt refuses to use it. Clarissa concerned because she has noticed pt's mental status starting to show signs of Dementia and she works f/t 3-23:00, would like assistance with his medications Yoshi: p/c from Dr Andrea requesting worker to speak with pt and pt's dghtr at bedside about home care needs. Worker met with pt (alert and orientedx3) and his dghtr Clarissa at bedside, pt authorized worker to speak with his dghtr. Reportedly, Clarissa built an extension at her house for pt to live there after her mother passed and he returned from [facility]. Per Clarissa pt is independent in his ADLs has a cane and a RW however, pt refuses to use it. Clarissa concerned because she has noticed pt's mental status starting to [facility] ,recently diagnosed with  early stage Dementia. Clarissa feeling somewhat overwhelmed, she works f/t 3-23:00 states she worries about pt's medical needs, would like assistance with medication management .Worker informed [facility] ([facility] home care info discussed, referral done. Worker encouraged Clarissa to plan about future needs. Pt and Clarissa not ready for placement as yet, pt would prefer to remain home ,somewhat in agreement for services. Worker provided private hire list ,[facility] of [facility] Aging phone to explore possible social groups and/or other community resources . Clarissa agreed to f/u accordingly. No other concerns reported at this moment. Worker informed dr Andrea about the above, verbalized understanding.

## 2019-04-27 NOTE — ED PROVIDER NOTE - OBJECTIVE STATEMENT
89 y/o M pt with hx of BPH, CAD with 1 stent, MI, hypothyroidism, TIA, and appendectomy presents to ED with daughter c/o mild abdominal pain and diarrhea today. Pt's daughter states she came home from work today and found feces all over the apartment. Pt states he had abdominal pain yesterday which resolved. Daughter reports patient is in the beginning stages of dementia. Daughter states pt had C-diff last year which was not preceded by antibiotics. Denies recent illness, recent antibiotics, fever, chills, CP, SOB, and vomiting. No further complaints at this time.   PMD: Teddy  Cardio: [facility]

## 2019-04-27 NOTE — ED ADULT TRIAGE NOTE - CHIEF COMPLAINT QUOTE
my daughter sent me, as per EMS diarrhea for past three days and Hx of c-diff. Patient A&Ox3 but poor historian, not completely sure why family sent him. denies any abdominal pain, nausea, or vomiting

## 2019-05-16 ENCOUNTER — INPATIENT (INPATIENT)
Facility: HOSPITAL | Age: 84
LOS: 4 days | Discharge: HOME | DRG: 897 | End: 2019-05-21
Attending: HOSPITALIST
Payer: MEDICARE

## 2019-05-16 VITALS
RESPIRATION RATE: 16 BRPM | SYSTOLIC BLOOD PRESSURE: 69 MMHG | TEMPERATURE: 98 F | HEART RATE: 72 BPM | DIASTOLIC BLOOD PRESSURE: 46 MMHG | OXYGEN SATURATION: 99 %

## 2019-05-16 DIAGNOSIS — Z98.89 OTHER SPECIFIED POSTPROCEDURAL STATES: Chronic | ICD-10-CM

## 2019-05-16 DIAGNOSIS — Z87.2 PERSONAL HISTORY OF DISEASES OF THE SKIN AND SUBCUTANEOUS TISSUE: Chronic | ICD-10-CM

## 2019-05-16 DIAGNOSIS — I25.9 CHRONIC ISCHEMIC HEART DISEASE, UNSPECIFIED: Chronic | ICD-10-CM

## 2019-05-16 DIAGNOSIS — Z41.9 ENCOUNTER FOR PROCEDURE FOR PURPOSES OTHER THAN REMEDYING HEALTH STATE, UNSPECIFIED: Chronic | ICD-10-CM

## 2019-05-16 LAB
ALBUMIN SERPL ELPH-MCNC: 3.6 G/DL — SIGNIFICANT CHANGE UP (ref 3.3–5.2)
ALP SERPL-CCNC: 76 U/L — SIGNIFICANT CHANGE UP (ref 40–120)
ALT FLD-CCNC: 7 U/L — SIGNIFICANT CHANGE UP
ANION GAP SERPL CALC-SCNC: 13 MMOL/L — SIGNIFICANT CHANGE UP (ref 5–17)
APPEARANCE UR: CLEAR — SIGNIFICANT CHANGE UP
APTT BLD: 23.2 SEC — LOW (ref 27.5–36.3)
AST SERPL-CCNC: 26 U/L — SIGNIFICANT CHANGE UP
BASOPHILS # BLD AUTO: 0 K/UL — SIGNIFICANT CHANGE UP (ref 0–0.2)
BASOPHILS NFR BLD AUTO: 0.5 % — SIGNIFICANT CHANGE UP (ref 0–2)
BILIRUB SERPL-MCNC: 0.7 MG/DL — SIGNIFICANT CHANGE UP (ref 0.4–2)
BILIRUB UR-MCNC: NEGATIVE — SIGNIFICANT CHANGE UP
BUN SERPL-MCNC: 25 MG/DL — HIGH (ref 8–20)
CALCIUM SERPL-MCNC: 9 MG/DL — SIGNIFICANT CHANGE UP (ref 8.6–10.2)
CHLORIDE SERPL-SCNC: 107 MMOL/L — SIGNIFICANT CHANGE UP (ref 98–107)
CO2 SERPL-SCNC: 20 MMOL/L — LOW (ref 22–29)
COLOR SPEC: YELLOW — SIGNIFICANT CHANGE UP
CREAT SERPL-MCNC: 1.2 MG/DL — SIGNIFICANT CHANGE UP (ref 0.5–1.3)
DIFF PNL FLD: NEGATIVE — SIGNIFICANT CHANGE UP
EOSINOPHIL # BLD AUTO: 0.3 K/UL — SIGNIFICANT CHANGE UP (ref 0–0.5)
EOSINOPHIL NFR BLD AUTO: 4.3 % — SIGNIFICANT CHANGE UP (ref 0–5)
ETHANOL SERPL-MCNC: 150 MG/DL — SIGNIFICANT CHANGE UP
GLUCOSE SERPL-MCNC: 87 MG/DL — SIGNIFICANT CHANGE UP (ref 70–115)
GLUCOSE UR QL: NEGATIVE MG/DL — SIGNIFICANT CHANGE UP
HCT VFR BLD CALC: 41.8 % — LOW (ref 42–52)
HGB BLD-MCNC: 13.7 G/DL — LOW (ref 14–18)
INR BLD: 1.03 RATIO — SIGNIFICANT CHANGE UP (ref 0.88–1.16)
KETONES UR-MCNC: NEGATIVE — SIGNIFICANT CHANGE UP
LEUKOCYTE ESTERASE UR-ACNC: ABNORMAL
LIDOCAIN IGE QN: 30 U/L — SIGNIFICANT CHANGE UP (ref 22–51)
LYMPHOCYTES # BLD AUTO: 1.9 K/UL — SIGNIFICANT CHANGE UP (ref 1–4.8)
LYMPHOCYTES # BLD AUTO: 24.9 % — SIGNIFICANT CHANGE UP (ref 20–55)
MCHC RBC-ENTMCNC: 31.2 PG — HIGH (ref 27–31)
MCHC RBC-ENTMCNC: 32.8 G/DL — SIGNIFICANT CHANGE UP (ref 32–36)
MCV RBC AUTO: 95.2 FL — HIGH (ref 80–94)
MONOCYTES # BLD AUTO: 0.7 K/UL — SIGNIFICANT CHANGE UP (ref 0–0.8)
MONOCYTES NFR BLD AUTO: 9.4 % — SIGNIFICANT CHANGE UP (ref 3–10)
NEUTROPHILS # BLD AUTO: 4.6 K/UL — SIGNIFICANT CHANGE UP (ref 1.8–8)
NEUTROPHILS NFR BLD AUTO: 60.6 % — SIGNIFICANT CHANGE UP (ref 37–73)
NITRITE UR-MCNC: NEGATIVE — SIGNIFICANT CHANGE UP
PCP SPEC-MCNC: SIGNIFICANT CHANGE UP
PH UR: 6 — SIGNIFICANT CHANGE UP (ref 5–8)
PLATELET # BLD AUTO: 141 K/UL — LOW (ref 150–400)
POTASSIUM SERPL-MCNC: 4.6 MMOL/L — SIGNIFICANT CHANGE UP (ref 3.5–5.3)
POTASSIUM SERPL-SCNC: 4.6 MMOL/L — SIGNIFICANT CHANGE UP (ref 3.5–5.3)
PROT SERPL-MCNC: 6.6 G/DL — SIGNIFICANT CHANGE UP (ref 6.6–8.7)
PROT UR-MCNC: NEGATIVE MG/DL — SIGNIFICANT CHANGE UP
PROTHROM AB SERPL-ACNC: 11.9 SEC — SIGNIFICANT CHANGE UP (ref 10–12.9)
RBC # BLD: 4.39 M/UL — LOW (ref 4.6–6.2)
RBC # FLD: 13.9 % — SIGNIFICANT CHANGE UP (ref 11–15.6)
SODIUM SERPL-SCNC: 140 MMOL/L — SIGNIFICANT CHANGE UP (ref 135–145)
SP GR SPEC: 1.01 — SIGNIFICANT CHANGE UP (ref 1.01–1.02)
TROPONIN T SERPL-MCNC: <0.01 NG/ML — SIGNIFICANT CHANGE UP (ref 0–0.06)
UROBILINOGEN FLD QL: NEGATIVE MG/DL — SIGNIFICANT CHANGE UP
WBC # BLD: 7.7 K/UL — SIGNIFICANT CHANGE UP (ref 4.8–10.8)
WBC # FLD AUTO: 7.7 K/UL — SIGNIFICANT CHANGE UP (ref 4.8–10.8)

## 2019-05-16 PROCEDURE — 71045 X-RAY EXAM CHEST 1 VIEW: CPT | Mod: 26

## 2019-05-16 PROCEDURE — 99291 CRITICAL CARE FIRST HOUR: CPT

## 2019-05-16 RX ORDER — SODIUM CHLORIDE 9 MG/ML
1000 INJECTION INTRAMUSCULAR; INTRAVENOUS; SUBCUTANEOUS ONCE
Refills: 0 | Status: COMPLETED | OUTPATIENT
Start: 2019-05-16 | End: 2019-05-16

## 2019-05-16 RX ADMIN — SODIUM CHLORIDE 1000 MILLILITER(S): 9 INJECTION INTRAMUSCULAR; INTRAVENOUS; SUBCUTANEOUS at 20:51

## 2019-05-16 NOTE — ED PROVIDER NOTE - NS ED ROS FT
Const: + unresponsiveness. Denies fever, chills  HEENT: Denies blurry vision, sore throat  Neck: Denies neck pain/stiffness  Resp: Denies coughing, SOB  Cardiovascular: Denies CP, palpitations, LE edema  GI: + diarrhea. Denies nausea, vomiting, abdominal pain, constipation, blood in stool  : Denies urinary frequency/urgency/dysuria, hematuria  MSK: Denies back pain  Neuro: Denies HA, dizziness, numbness, weakness  Skin: Denies rashes.

## 2019-05-16 NOTE — ED PROVIDER NOTE - PHYSICAL EXAMINATION
Const: Awake, alert and oriented. In no acute distress. Well appearing. Smells strongly of alcohol.  HEENT: NC/AT. Moist mucous membranes.  Eyes: No scleral icterus. EOMI.  Neck:. Soft and supple. Full ROM without pain.  Cardiac: PPM in left chest wall. Regular rate and regular rhythm. +S1/S2. No murmurs. Peripheral pulses 2+ and symmetric. No LE edema.  Resp: Speaking in full sentences. No evidence of respiratory distress. No wheezes, rales or rhonchi.  Abd: Soft, non-tender, non-distended. Normal bowel sounds in all 4 quadrants. No guarding or rebound.  Back: Spine midline and non-tender. No CVAT.  Skin: No rashes, abrasions or lacerations.  Neuro: Awake, alert & oriented x 3. Moves all extremities symmetrically. Mildly slurred speech.

## 2019-05-16 NOTE — ED PROVIDER NOTE - OBJECTIVE STATEMENT
Patient with PMH CAD s/p stent placement, TIA, presents complaining of hypotension.    Cards: Monroe Florez. Patient with PMH CAD s/p stent placement, PPM ([employer]) TIA, presents complaining of hypotension, and unresponsiveness. Daughter came home from work this evening and found patient in his chair slumped over and unresponsive. He was unarousable, therefore she called EMS who also could not arouse patient. When they started moving him he began responding, noted slurred speech, but quickly became more responsive. He was found to be hypotensive, but not tachycardic. He states that he had diarrhea last week, denies bloody stools, denies fevers, chills, nausea, vomiting, chest pain, palptiations, or SOB. He admits to drinking EtOH today (states he had "not even one beer"). Daughter states she has no alcohol in the house and isn't sure where he would have gotten alcohol from. Patient denies allergies to medications, denies smoking.  PMD; Shasha    Cards: Monroe Florez.

## 2019-05-16 NOTE — ED ADULT TRIAGE NOTE - CHIEF COMPLAINT QUOTE
Pt with hx of MI and cardiac PM found slumped over in chair at home unresponsive by daughter when she returned home from work. EMS reports when they arrived pt was unresponsive but after some stimulation pt came to and was at baseline. Pt able to maex4 with equal strength and purpose. Denies any ailments. Pt hypotensive.

## 2019-05-16 NOTE — ED ADULT NURSE NOTE - NSIMPLEMENTINTERV_GEN_ALL_ED
Implemented All Fall with Harm Risk Interventions:  [place] to call system. Call bell, personal items and telephone within reach. Instruct patient to call for assistance. Room bathroom lighting operational. Non-slip footwear when patient is off stretcher. Physically safe environment: no spills, clutter or unnecessary equipment. Stretcher in lowest position, wheels locked, appropriate side rails in place. Provide visual cue, wrist band, yellow gown, etc. Monitor gait and stability. Monitor for mental status changes and reorient to person, place, and time. Review medications for side effects contributing to fall risk. Reinforce activity limits and safety measures with patient and family. Provide visual clues: red socks.

## 2019-05-16 NOTE — ED PROVIDER NOTE - CLINICAL SUMMARY MEDICAL DECISION MAKING FREE TEXT BOX
Patient with an episode of unresponsiveness, found to be hypotensive in the 60/40's, admits to EtOH intake tonight, fluid responsive, recently with 2 weeks of diarrhea, was in this ED but unable to test for C-diff due to formed stools. Consider EtOH intoxication as cause of unresponsiveness and hypotension, will check co-ingestants, CT head, look for infectious etiology. PPM was interrogated last month without issue, will re-interrogate PPM, IV hydration of patient and will discuss with daughter regarding depression or other cause of sudden drinking.

## 2019-05-16 NOTE — ED PROVIDER NOTE - PROGRESS NOTE DETAILS
I discussed with Dr. Barber (SB cards) who notes that the patient's PPM was just interrogated last month and that it is working well. THe lower rate is set at 60. 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 She expresses concern that there is no history of EtOH abuse in this patient and consider suicidality. I attempted to interrogate PPM, however it was unable to send data to [employer]. [employer] rep paged. recd sign ou  patient await cardiology, called by dr morataya, spoke to pateit daughter willis she appeared very frustrated, however, patient does not want any help, infact states he hasn't had alcohol at all, patient monitored in ed, signed out too am ed attending Received patient signout from Dr. Novoa.  Patient found sleeping while at table possible syncope? troponin negative ETOH noted.  Cardiology paged by evening doctor patient pending evaluation by cardiology team. Cardiology re-paged.

## 2019-05-17 LAB
AMPHET UR-MCNC: NEGATIVE — SIGNIFICANT CHANGE UP
BACTERIA # UR AUTO: ABNORMAL
BARBITURATES UR SCN-MCNC: NEGATIVE — SIGNIFICANT CHANGE UP
BENZODIAZ UR-MCNC: NEGATIVE — SIGNIFICANT CHANGE UP
COCAINE METAB.OTHER UR-MCNC: NEGATIVE — SIGNIFICANT CHANGE UP
EPI CELLS # UR: SIGNIFICANT CHANGE UP
METHADONE UR-MCNC: NEGATIVE — SIGNIFICANT CHANGE UP
OPIATES UR-MCNC: NEGATIVE — SIGNIFICANT CHANGE UP
PCP UR-MCNC: NEGATIVE — SIGNIFICANT CHANGE UP
RBC CASTS # UR COMP ASSIST: SIGNIFICANT CHANGE UP /HPF (ref 0–4)
THC UR QL: NEGATIVE — SIGNIFICANT CHANGE UP
WBC UR QL: SIGNIFICANT CHANGE UP

## 2019-05-17 PROCEDURE — 99218: CPT

## 2019-05-17 PROCEDURE — 70450 CT HEAD/BRAIN W/O DYE: CPT | Mod: 26

## 2019-05-17 RX ORDER — SODIUM CHLORIDE 9 MG/ML
1000 INJECTION INTRAMUSCULAR; INTRAVENOUS; SUBCUTANEOUS ONCE
Refills: 0 | Status: COMPLETED | OUTPATIENT
Start: 2019-05-17 | End: 2019-05-17

## 2019-05-17 RX ORDER — DONEPEZIL HYDROCHLORIDE 10 MG/1
10 TABLET, FILM COATED ORAL DAILY
Refills: 0 | Status: DISCONTINUED | OUTPATIENT
Start: 2019-05-17 | End: 2019-05-21

## 2019-05-17 RX ORDER — SIMVASTATIN 20 MG/1
20 TABLET, FILM COATED ORAL AT BEDTIME
Refills: 0 | Status: DISCONTINUED | OUTPATIENT
Start: 2019-05-17 | End: 2019-05-21

## 2019-05-17 RX ORDER — CLOPIDOGREL BISULFATE 75 MG/1
75 TABLET, FILM COATED ORAL DAILY
Refills: 0 | Status: DISCONTINUED | OUTPATIENT
Start: 2019-05-17 | End: 2019-05-21

## 2019-05-17 RX ORDER — CARVEDILOL PHOSPHATE 80 MG/1
3.12 CAPSULE, EXTENDED RELEASE ORAL EVERY 12 HOURS
Refills: 0 | Status: DISCONTINUED | OUTPATIENT
Start: 2019-05-17 | End: 2019-05-19

## 2019-05-17 RX ORDER — LEVOTHYROXINE SODIUM 125 MCG
75 TABLET ORAL DAILY
Refills: 0 | Status: DISCONTINUED | OUTPATIENT
Start: 2019-05-17 | End: 2019-05-21

## 2019-05-17 RX ORDER — DOXAZOSIN MESYLATE 4 MG
4 TABLET ORAL AT BEDTIME
Refills: 0 | Status: DISCONTINUED | OUTPATIENT
Start: 2019-05-17 | End: 2019-05-18

## 2019-05-17 RX ORDER — ISOSORBIDE MONONITRATE 60 MG/1
60 TABLET, EXTENDED RELEASE ORAL AT BEDTIME
Refills: 0 | Status: DISCONTINUED | OUTPATIENT
Start: 2019-05-17 | End: 2019-05-19

## 2019-05-17 RX ADMIN — SODIUM CHLORIDE 1000 MILLILITER(S): 9 INJECTION INTRAMUSCULAR; INTRAVENOUS; SUBCUTANEOUS at 23:43

## 2019-05-17 RX ADMIN — SIMVASTATIN 20 MILLIGRAM(S): 20 TABLET, FILM COATED ORAL at 20:52

## 2019-05-17 RX ADMIN — DONEPEZIL HYDROCHLORIDE 10 MILLIGRAM(S): 10 TABLET, FILM COATED ORAL at 20:51

## 2019-05-17 RX ADMIN — CLOPIDOGREL BISULFATE 75 MILLIGRAM(S): 75 TABLET, FILM COATED ORAL at 20:51

## 2019-05-17 RX ADMIN — Medication 4 MILLIGRAM(S): at 20:52

## 2019-05-17 RX ADMIN — SODIUM CHLORIDE 2000 MILLILITER(S): 9 INJECTION INTRAMUSCULAR; INTRAVENOUS; SUBCUTANEOUS at 00:05

## 2019-05-17 NOTE — ED CDU PROVIDER INITIAL DAY NOTE - OBJECTIVE STATEMENT
89 y/o M with CAD s/p stent placement, PPM ([employer]- placed August 2018), TIA, presents complaining of hypotension, and unresponsiveness. Daughter reports that she came home to find her father with his head down at the table around 7pm, she tried to speak to him and awake him by tapping him and he had no response, EMS was called, placed on stretcher and then verbally replied to EMS, pt was unconscious for unknown period of time, last contact time was 3:30pm.  Pt and daughter live together at home, daughter has been primary caretaker. Daughter reports approx 3 weeks ago after work when she came home, she states he fell at home and did not want to tell her. Pt states he slipped on floor and landed on side. States he had recent episode of diarrhea last week. Denies fever/chills, nausea/vomiting, cough, CP, palpitations,  SOB, dysuria.   PMD; Shasha, Cards: Monroe Florez

## 2019-05-17 NOTE — CONSULT NOTE ADULT - SUBJECTIVE AND OBJECTIVE BOX
[facility], THE HEART CENTER                                   [address] [place] [address]                                                      PHONE: [phone]                                                         FAX: [phone]  [email or web address]  ---------------------------------------------------------------------------------------------------------------------------------    89y Male with past medical history of CAD s/p stent placement, PPM ([employer]) TIA, presents complaining of hypotension, and unresponsiveness. Daughter came home from work this evening and found patient in his chair slumped over and unresponsive.    PAST MEDICAL & SURGICAL HISTORY:  Hypothyroidism  BPH (benign prostatic hypertrophy)  Stented coronary artery: x1  Cataract  TIA (transient ischemic attack)  Heart attack  Enlarged prostate  Coronary artery disease  Anginal pain  Reflux  Elective surgery: pt had bladder mass removed apporoximayly 6 weeks ago by Dr. Mccord  H/O cataract removal with insertion of prosthetic lens  Coronary artery disease: cardiac stents   H/O pilonidal cyst  H/O: hemorrhoidectomy  S/P appendectomy      No Known Allergies      MEDICATIONS  (STANDING):    MEDICATIONS  (PRN):      Social History:  No smoking   No alcohol  No     Family History:       ROS: Negative other than as mentioned in HPI.    Vital Signs Last 24 Hrs  T(C): 36.6 (17 May 2019 07:41), Max: 36.6 (17 May 2019 07:41)  T(F): 97.9 (17 May 2019 07:41), Max: 97.9 (17 May 2019 07:41)  HR: 61 (17 May 2019 07:41) (61 - 100)  BP: 118/59 (17 May 2019 07:41) (69/46 - 137/64)  BP(mean): --  RR: 16 (17 May 2019 07:41) (15 - 16)  SpO2: 99% (17 May 2019 07:41) (97% - 100%)  ICU Vital Signs Last 24 Hrs  MARISA DIOR  I&O's Detail    I&O's Summary    Drug Dosing Weight  MARISA DIOR      PHYSICAL EXAM:  Genral: well built, resting comfortably  HEENT:  No JVD  CARDIOVASCULAR: Normal S1 and S2, No murmur, rub, lift.   LUNGS: No rales, rhonchi or wheeze. Normal breath sounds bilaterally.  ABDOMEN: Soft, nontender without mass or organomegaly. bowel sounds normoactive.  EXTREMITIES: No clubbing, cyanosis or edema  Neuro: awake alert          LABS:                        13.7   7.7   )-----------( 141      ( 16 May 2019 20:49 )             41.8         140  |  107  |  25.0<H>  ----------------------------<  87  4.6   |  20.0<L>  |  1.20    Ca    9.0      16 May 2019 20:49    TPro  6.6  /  Alb  3.6  /  TBili  0.7  /  DBili  x   /  AST  26  /  ALT  7   /  AlkPhos  76      MARISA DIOR  CARDIAC MARKERS ( 16 May 2019 20:49 )  x     / <0.01 ng/mL / x     / x     / x          PT/INR - ( 16 May 2019 20:49 )   PT: 11.9 sec;   INR: 1.03 ratio         PTT - ( 16 May 2019 20:49 )  PTT:23.2 sec  Urinalysis Basic - ( 16 May 2019 23:49 )    Color: Yellow / Appearance: Clear / S.010 / pH: x  Gluc: x / Ketone: Negative  / Bili: Negative / Urobili: Negative mg/dL   Blood: x / Protein: Negative mg/dL / Nitrite: Negative   Leuk Esterase: Trace / RBC: 0-2 /HPF / WBC 0-2   Sq Epi: x / Non Sq Epi: Occasional / Bacteria: Occasional    ECG: AV paced rhythm  CT head: No acute pathology    Assessment and Plan:  In summary, MARISA DIOR is an 89y Male with past medical history significant for CAD s/p stent placement, PPM ([employer]) TIA, presents complaining of hypotension, and unresponsiveness. Daughter came home from work this evening and found patient in his chair slumped over and unresponsive. [facility], THE HEART CENTER                                   [address] [place] [address]                                                      PHONE: [phone]                                                         FAX: [phone]  [email or web address]  ---------------------------------------------------------------------------------------------------------------------------------    89y Male with past medical history of CAD s/p stent placement, PPM ([employer]) TIA, presents complaining of hypotension, and unresponsiveness. Daughter came home from work this evening and found patient in his chair slumped over and unresponsive. EMS noted him to be hypotensive (initail BP 69). Apparently had diarrhea two days ago. Patient not a good historian. Denies any recent dizziness/CP/SOB. Denies previous syncope.     PAST MEDICAL & SURGICAL HISTORY:  Hypothyroidism  BPH (benign prostatic hypertrophy)  Stented coronary artery: x1  Cataract  TIA (transient ischemic attack)  Heart attack  Enlarged prostate  Coronary artery disease  Anginal pain  Reflux  Elective surgery: pt had bladder mass removed apporoximayly 6 weeks ago by Dr. Mccord  H/O cataract removal with insertion of prosthetic lens  Coronary artery disease: cardiac stents   H/O pilonidal cyst  H/O: hemorrhoidectomy  S/P appendectomy      No Known Allergies      MEDICATIONS  (STANDING):    MEDICATIONS  (PRN):      Social History:  No smoking   No alcohol  No     Family History: denies FM of early cad      ROS: Negative other than as mentioned in HPI.    Vital Signs Last 24 Hrs  T(C): 36.6 (17 May 2019 07:41), Max: 36.6 (17 May 2019 07:41)  T(F): 97.9 (17 May 2019 07:41), Max: 97.9 (17 May 2019 07:41)  HR: 61 (17 May 2019 07:41) (61 - 100)  BP: 118/59 (17 May 2019 07:41) (69/46 - 137/64)  BP(mean): --  RR: 16 (17 May 2019 07:41) (15 - 16)  SpO2: 99% (17 May 2019 07:41) (97% - 100%)  ICU Vital Signs Last 24 Hrs  MARISA DIOR  I&O's Detail    I&O's Summary    Drug Dosing Weight  MARISA DIOR      PHYSICAL EXAM:  Genral: well built, resting comfortably  HEENT:  No JVD  CARDIOVASCULAR: Normal S1 and S2, No murmur, rub, lift.   LUNGS: No rales, rhonchi or wheeze. Normal breath sounds bilaterally.  ABDOMEN: Soft, nontender without mass or organomegaly. bowel sounds normoactive.  EXTREMITIES: No clubbing, cyanosis or edema  Neuro: awake alert          LABS:                        13.7   7.7   )-----------( 141      ( 16 May 2019 20:49 )             41.8         140  |  107  |  25.0<H>  ----------------------------<  87  4.6   |  20.0<L>  |  1.20    Ca    9.0      16 May 2019 20:49    TPro  6.6  /  Alb  3.6  /  TBili  0.7  /  DBili  x   /  AST  26  /  ALT  7   /  AlkPhos  76      MARISA DIOR  CARDIAC MARKERS ( 16 May 2019 20:49 )  x     / <0.01 ng/mL / x     / x     / x          PT/INR - ( 16 May 2019 20:49 )   PT: 11.9 sec;   INR: 1.03 ratio         PTT - ( 16 May 2019 20:49 )  PTT:23.2 sec  Urinalysis Basic - ( 16 May 2019 23:49 )    Color: Yellow / Appearance: Clear / S.010 / pH: x  Gluc: x / Ketone: Negative  / Bili: Negative / Urobili: Negative mg/dL   Blood: x / Protein: Negative mg/dL / Nitrite: Negative   Leuk Esterase: Trace / RBC: 0-2 /HPF / WBC 0-2   Sq Epi: x / Non Sq Epi: Occasional / Bacteria: Occasional    ECG: AV paced rhythm  CT head: No acute pathology    Assessment and Plan:  In summary, MARISA DIOR is an 89y Male with past medical history significant for CAD s/p stent placement, PPM ([employer]) TIA, presents complaining of hypotension, and unresponsiveness. Daughter came home from work this evening and found patient in his chair slumped over and unresponsive. EMS noted him to be hypotensive (initail BP 69). Apparently had diarrhea two days ago. Patient not a good historian. Denies any recent dizziness/CP/SOB. Denies previous syncope.   Syncope: likely due to dehydration from diarrhea. Vitals stable now. Will check echo. will arrange for PM interrogation  CAD: c/w current rx

## 2019-05-17 NOTE — ED CDU PROVIDER INITIAL DAY NOTE - PROGRESS NOTE DETAILS
Spoke to daughter at bedside, states she cannot care for patient at home and needs help, FAITH Rojo aware. Spoke to [employer] rep (Harman) who performed interrogation of pacemaker, incidental findings related to atrial leads that have no effect on his apparent syncopal episode, no other acute events found, from an EP perspective, safe and cleared for d/c, received phone call with MD Bae. Spoke to [employer] rep (Harman) who performed interrogation of pacemaker, incidental findings related to atrial leads that have no effect on his apparent syncopal episode, no other acute events found, from an EP perspective, safe and cleared for d/c, received phone call with MD Bae. Per Case Management daughter will be watching patient over weekend and will set up aides for Monday for care, daughter spoke to agency that will be coming for evaluation on Monday.

## 2019-05-17 NOTE — ED CDU PROVIDER INITIAL DAY NOTE - MEDICAL DECISION MAKING DETAILS
89 y/o M with LOC for unknown time with alcohol in system  -Cards consult, CM/SW for home care, interrogate PPM, TTE  -Will follow up and re-evaluate patient

## 2019-05-17 NOTE — ED CDU PROVIDER INITIAL DAY NOTE - PHYSICAL EXAMINATION
Vital signs noted, see flowsheet.  General: Well nourished/developed. In no acute distress, well appearing and non-toxic.  HEENT: Normocephalic/atraumatic.  Moist mucous membranes. No nasal discharge, throat clear.  Conjunctiva and sclera clear b/l.  EOMI. PERRL.  Neck: Soft and supple, full ROM without pain.  Cardiac: Regular rate and rhythm. +S1/S2. Peripheral pulses 2+ and symmetric b/l. No LE edema.  Respiratory: Speaking in full sentences, no evidence of respiratory distress. Lungs clear to ascultation b/l, no wheezes/rhonchi/rales/stridor.   Abdomen: Normoactive bowel sounds x 4 quadrants. Soft, non-tender, non-distended. No guarding or rebound tenderness. No suprapubic tenderness.  Back: Spine midline and non-tender. No CVAT.  MSK: No muscle or joint tenderness to palpation.  Skin: Normal color for race, no evidence of rash, ecchymosis, cyanosis or jaundice.   Lymph: No cervical lymphadenopathy  Neuro: Awake, alert and oriented to person/place/time/situation. Moves all extremities spontaneously and symmetrically.  No focal deficits or facial droop.  CN II-XII intact.

## 2019-05-17 NOTE — ED CDU PROVIDER INITIAL DAY NOTE - ATTENDING CONTRIBUTION TO CARE
seen with acp: elderly male with apparent syncopal episode in setting of hx of alcoholism and acute alcohol intoxication; on exam, well appearing, NAD, dry mucosa; otherwise unremarkable heart and lung sounds, abd soft nt nd, no JVD, no pedal edema; no signs of acute CHF; suspect alcohol largely responsible for inability to arouse; however coordinating with cards for serial enzymes, tele, and echo; awaiting cards consult; SW and CM seeing patient to assist daughter with in-home needs.

## 2019-05-17 NOTE — ED CDU PROVIDER INITIAL DAY NOTE - PMH
Anginal pain    BPH (benign prostatic hypertrophy)    Cataract    Coronary artery disease    Enlarged prostate    Heart attack    Hypothyroidism    Reflux    Stented coronary artery  x1  TIA (transient ischemic attack) Anginal pain    BPH (benign prostatic hypertrophy)    Cataract    Coronary artery disease    Dementia    Enlarged prostate    Heart attack    Hypothyroidism    Reflux    Stented coronary artery  x1  TIA (transient ischemic attack)

## 2019-05-17 NOTE — ED BEHAVIORAL HEALTH NOTE - BEHAVIORAL HEALTH NOTE
SW NOTE: Met with daughter at bedside, she was overwhelmed by having patient in her home and reported safety concerns due to his dementia when he is alone. Daughter made aware that she will need to supplement him with supervision when she leaves the home. Emotional support provided to daughter. Daughter educated qcue to assist with Medicaid application. She was given Medicaid application, documents list needed for Medicaid Application. Info on [employer]. Daughter educated on Elder Care . Daughter called an Aide agency was informed that aides cannot be provided until Monday. Daughter aware that she will need to watch patient over the weekend. MD at bedside during later end of conversation and made aware of the plan. As per daughter patient will refuse nursing home placement

## 2019-05-18 DIAGNOSIS — R55 SYNCOPE AND COLLAPSE: ICD-10-CM

## 2019-05-18 LAB
ALBUMIN SERPL ELPH-MCNC: 3.8 G/DL — SIGNIFICANT CHANGE UP (ref 3.3–5.2)
ALP SERPL-CCNC: 87 U/L — SIGNIFICANT CHANGE UP (ref 40–120)
ALT FLD-CCNC: 6 U/L — SIGNIFICANT CHANGE UP
ANION GAP SERPL CALC-SCNC: 12 MMOL/L — SIGNIFICANT CHANGE UP (ref 5–17)
AST SERPL-CCNC: 22 U/L — SIGNIFICANT CHANGE UP
BILIRUB SERPL-MCNC: 0.7 MG/DL — SIGNIFICANT CHANGE UP (ref 0.4–2)
BUN SERPL-MCNC: 23 MG/DL — HIGH (ref 8–20)
CALCIUM SERPL-MCNC: 9.1 MG/DL — SIGNIFICANT CHANGE UP (ref 8.6–10.2)
CHLORIDE SERPL-SCNC: 104 MMOL/L — SIGNIFICANT CHANGE UP (ref 98–107)
CO2 SERPL-SCNC: 24 MMOL/L — SIGNIFICANT CHANGE UP (ref 22–29)
CREAT SERPL-MCNC: 1.5 MG/DL — HIGH (ref 0.5–1.3)
GLUCOSE SERPL-MCNC: 122 MG/DL — HIGH (ref 70–115)
HCT VFR BLD CALC: 44.3 % — SIGNIFICANT CHANGE UP (ref 42–52)
HGB BLD-MCNC: 14.5 G/DL — SIGNIFICANT CHANGE UP (ref 14–18)
MCHC RBC-ENTMCNC: 31.3 PG — HIGH (ref 27–31)
MCHC RBC-ENTMCNC: 32.7 G/DL — SIGNIFICANT CHANGE UP (ref 32–36)
MCV RBC AUTO: 95.5 FL — HIGH (ref 80–94)
PLATELET # BLD AUTO: 141 K/UL — LOW (ref 150–400)
POTASSIUM SERPL-MCNC: 4.3 MMOL/L — SIGNIFICANT CHANGE UP (ref 3.5–5.3)
POTASSIUM SERPL-SCNC: 4.3 MMOL/L — SIGNIFICANT CHANGE UP (ref 3.5–5.3)
PROT SERPL-MCNC: 6.9 G/DL — SIGNIFICANT CHANGE UP (ref 6.6–8.7)
RBC # BLD: 4.64 M/UL — SIGNIFICANT CHANGE UP (ref 4.6–6.2)
RBC # FLD: 13.9 % — SIGNIFICANT CHANGE UP (ref 11–15.6)
SODIUM SERPL-SCNC: 140 MMOL/L — SIGNIFICANT CHANGE UP (ref 135–145)
T4 AB SER-ACNC: 6.2 UG/DL — SIGNIFICANT CHANGE UP (ref 4.5–12)
TROPONIN T SERPL-MCNC: <0.01 NG/ML — SIGNIFICANT CHANGE UP (ref 0–0.06)
TSH SERPL-MCNC: 7.42 UIU/ML — HIGH (ref 0.27–4.2)
WBC # BLD: 8.1 K/UL — SIGNIFICANT CHANGE UP (ref 4.8–10.8)
WBC # FLD AUTO: 8.1 K/UL — SIGNIFICANT CHANGE UP (ref 4.8–10.8)

## 2019-05-18 PROCEDURE — 99223 1ST HOSP IP/OBS HIGH 75: CPT

## 2019-05-18 PROCEDURE — 99217: CPT

## 2019-05-18 RX ORDER — THIAMINE MONONITRATE (VIT B1) 100 MG
100 TABLET ORAL DAILY
Refills: 0 | Status: DISCONTINUED | OUTPATIENT
Start: 2019-05-18 | End: 2019-05-21

## 2019-05-18 RX ORDER — LISINOPRIL 2.5 MG/1
20 TABLET ORAL DAILY
Refills: 0 | Status: DISCONTINUED | OUTPATIENT
Start: 2019-05-18 | End: 2019-05-19

## 2019-05-18 RX ORDER — TAMSULOSIN HYDROCHLORIDE 0.4 MG/1
0.4 CAPSULE ORAL AT BEDTIME
Refills: 0 | Status: DISCONTINUED | OUTPATIENT
Start: 2019-05-18 | End: 2019-05-19

## 2019-05-18 RX ORDER — FOLIC ACID 0.8 MG
1 TABLET ORAL DAILY
Refills: 0 | Status: DISCONTINUED | OUTPATIENT
Start: 2019-05-18 | End: 2019-05-21

## 2019-05-18 RX ORDER — PANTOPRAZOLE SODIUM 20 MG/1
40 TABLET, DELAYED RELEASE ORAL
Refills: 0 | Status: DISCONTINUED | OUTPATIENT
Start: 2019-05-18 | End: 2019-05-21

## 2019-05-18 RX ORDER — ENOXAPARIN SODIUM 100 MG/ML
40 INJECTION SUBCUTANEOUS EVERY 24 HOURS
Refills: 0 | Status: DISCONTINUED | OUTPATIENT
Start: 2019-05-18 | End: 2019-05-21

## 2019-05-18 RX ADMIN — DONEPEZIL HYDROCHLORIDE 10 MILLIGRAM(S): 10 TABLET, FILM COATED ORAL at 11:53

## 2019-05-18 RX ADMIN — ISOSORBIDE MONONITRATE 60 MILLIGRAM(S): 60 TABLET, EXTENDED RELEASE ORAL at 21:56

## 2019-05-18 RX ADMIN — Medication 75 MICROGRAM(S): at 05:19

## 2019-05-18 RX ADMIN — CARVEDILOL PHOSPHATE 3.12 MILLIGRAM(S): 80 CAPSULE, EXTENDED RELEASE ORAL at 20:23

## 2019-05-18 RX ADMIN — PANTOPRAZOLE SODIUM 40 MILLIGRAM(S): 20 TABLET, DELAYED RELEASE ORAL at 18:36

## 2019-05-18 RX ADMIN — SIMVASTATIN 20 MILLIGRAM(S): 20 TABLET, FILM COATED ORAL at 21:56

## 2019-05-18 RX ADMIN — CLOPIDOGREL BISULFATE 75 MILLIGRAM(S): 75 TABLET, FILM COATED ORAL at 11:53

## 2019-05-18 RX ADMIN — TAMSULOSIN HYDROCHLORIDE 0.4 MILLIGRAM(S): 0.4 CAPSULE ORAL at 21:55

## 2019-05-18 RX ADMIN — Medication 1 MILLIGRAM(S): at 18:36

## 2019-05-18 RX ADMIN — Medication 1 TABLET(S): at 18:36

## 2019-05-18 RX ADMIN — ENOXAPARIN SODIUM 40 MILLIGRAM(S): 100 INJECTION SUBCUTANEOUS at 21:55

## 2019-05-18 NOTE — PROVIDER CONTACT NOTE (OTHER) - BACKGROUND
had status changed to "complete" by NP.  This PT spoke with RN: Mitzi and she confirms that pt is independent and intention was to discontinue order for PT consult.

## 2019-05-18 NOTE — ED ADULT NURSE REASSESSMENT NOTE - NSIMPLEMENTINTERV_GEN_ALL_ED
Implemented All Fall with Harm Risk Interventions:  [place] to call system. Call bell, personal items and telephone within reach. Instruct patient to call for assistance. Room bathroom lighting operational. Non-slip footwear when patient is off stretcher. Physically safe environment: no spills, clutter or unnecessary equipment. Stretcher in lowest position, wheels locked, appropriate side rails in place. Provide visual cue, wrist band, yellow gown, etc. Monitor gait and stability. Monitor for mental status changes and reorient to person, place, and time. Review medications for side effects contributing to fall risk. Reinforce activity limits and safety measures with patient and family. Provide visual clues: red socks.
Specific interventions were implemented:
Implemented All Fall with Harm Risk Interventions:  [place] to call system. Call bell, personal items and telephone within reach. Instruct patient to call for assistance. Room bathroom lighting operational. Non-slip footwear when patient is off stretcher. Physically safe environment: no spills, clutter or unnecessary equipment. Stretcher in lowest position, wheels locked, appropriate side rails in place. Provide visual cue, wrist band, yellow gown, etc. Monitor gait and stability. Monitor for mental status changes and reorient to person, place, and time. Review medications for side effects contributing to fall risk. Reinforce activity limits and safety measures with patient and family. Provide visual clues: red socks.

## 2019-05-18 NOTE — H&P ADULT - HISTORY OF PRESENT ILLNESS
This is 89YO Man with PMH of CAd s/p stent, PPM, dementia, hypothyroidism  who was brought to ED due to syncope and alcohol intoxication.   Patient came to ED on evening of 5/16. Patient is a poor historian. Per ED note Daughter came home from work and found him slumped over on chair, she could not get him to respond. EMS noted him to be hypotensive (initail BP 69). Apparently had diarrhea two days ago.    He was noted to be intoxicated with alcohol, serum alcohol was 150. He was placed on observation. He was seen by cardiology in ED and found to have an EF of 35-40% (previous EF of 60%).  Patient also had c diff approx 1 month ago and was treated.  He had diarrhea on Thursday as well, though he could not specify frequency or quantity.      Denied chest pain, SOB, palpitation, abd. pain, nausea, vomiting, headache, dizziness, numbness, tingling, urinary and bowel complaints.

## 2019-05-18 NOTE — ED ADULT NURSE REASSESSMENT NOTE - INTERVENTIONS DEFINITIONS
[place] to call system/Provide visual clues: red socks/Call bell, personal items and telephone within reach/Instruct patient to call for assistance/Non-slip footwear when patient is off stretcher/Room bathroom lighting operational/Stretcher in lowest position, wheels locked, appropriate side rails in place/Physically safe environment: no spills, clutter or unnecessary equipment

## 2019-05-18 NOTE — H&P ADULT - NSHPPHYSICALEXAM_GEN_ALL_CORE
ICU Vital Signs Last 24 Hrs  T(C): 36.6 (18 May 2019 11:34), Max: 36.6 (17 May 2019 19:03)  T(F): 97.8 (18 May 2019 11:34), Max: 97.9 (17 May 2019 19:03)  HR: 65 (18 May 2019 11:34) (61 - 69)  BP: 107/69 (18 May 2019 11:34) (101/64 - 117/75)  BP(mean): --  ABP: --  ABP(mean): --  RR: 16 (18 May 2019 11:34) (16 - 20)  SpO2: 96% (18 May 2019 11:34) (96% - 99%)

## 2019-05-18 NOTE — ED ADULT NURSE REASSESSMENT NOTE - STATUS
awaiting consult/awaiting cardiology consult
awaiting consult
awaiting consult/social work and cards reeval consults

## 2019-05-18 NOTE — CONSULT NOTE ADULT - ASSESSMENT
Syncope  - likely due to combination of alcohol intoxication and underlying cardiomyopathy  - monitor on tele  - fall precautions    CAD s/p stents, now with likely Subacute/Chronic Systolic Dysfunction  - not on beta-blocker, has PPM  - continue plavix, statin, ACEI  - Further w/u per Cardiology    Alcohol Abuse  - folic acid and thiamine orally Syncope  - likely due to combination of alcohol intoxication and underlying cardiomyopathy  - monitor on tele  - fall precautions    CAD s/p stents, now with likely Subacute/Chronic Systolic Dysfunction  - started on coreg  - continue plavix, statin, ACEI  - has hx of PPM  - check TSH  - Further w/u per Cardiology    Alcohol Abuse  - folic acid and thiamine orally Syncope  - likely due to combination of alcohol intoxication and underlying cardiomyopathy  - monitor on tele  - fall precautions    CAD s/p stents, now with likely Subacute/Chronic Systolic Dysfunction  - started on coreg  - continue plavix, statin, ACEI  - has hx of PPM  - check TSH  - Further w/u per Cardiology    Alcohol Abuse  - folic acid and thiamine orally     Diarrhea  - probably related to heavy alcohol use on 5/16  - given recent c diff, place on contact precaution, send c diff specimen if has diarrhea

## 2019-05-18 NOTE — H&P ADULT - NSICDXPASTMEDICALHX_GEN_ALL_CORE_FT
PAST MEDICAL HISTORY:  Alcohol abuse     Anginal pain     BPH (benign prostatic hypertrophy)     Cataract     Coronary artery disease     Dementia     Enlarged prostate     Heart attack     Hypothyroidism     Reflux     Stented coronary artery x1    TIA (transient ischemic attack)

## 2019-05-18 NOTE — H&P ADULT - ASSESSMENT
This is 89YO Man with PMH of CAd s/p stent, PPM, dementia, hypothyroidism  who was brought to ED due to syncope and alcohol intoxication.   Patient came to ED on evening of 5/16. Patient is a poor historian. Per ED note Daughter came home from work and found him slumped over on chair, she could not get him to respond. EMS noted him to be hypotensive (initail BP 69). Apparently had diarrhea two days ago.    He was noted to be intoxicated with alcohol, serum alcohol was 150. He was placed on observation. He was seen by cardiology in ED and found to have an EF of 35-40% (previous EF of 60%).  Patient also had c diff approx 1 month ago and was treated.  He had diarrhea on Thursday as well, though he could not specify frequency or quantity.      A/P    >Syncope due to alcohol intoxication  counseled to stop drinking   ciwa, ativan prn   c/w folic acid and thiamine  TTE showed EF 35-40% - previous EF 60%  tele to r/o arrhthymias  Pt eval    >New systolic chf- not in exacerbation  c/w ACE, coreg, statin  defer further management as per cardiology     >Alcohol abuse - as above    >CAD- c/w home medication     >Hypothyroidism - c/w synthroid - check TSH     >HLD - c/w statin     >DVt  PPX - Lovenox This is 89YO Man with PMH of CAd s/p stent, PPM, dementia, hypothyroidism  who was brought to ED due to syncope and alcohol intoxication.   Patient came to ED on evening of 5/16. Patient is a poor historian. Per ED note Daughter came home from work and found him slumped over on chair, she could not get him to respond. EMS noted him to be hypotensive (initail BP 69). Apparently had diarrhea two days ago.    He was noted to be intoxicated with alcohol, serum alcohol was 150. He was placed on observation. He was seen by cardiology in ED and found to have an EF of 35-40% (previous EF of 60%).  Patient also had c diff approx 1 month ago and was treated.  He had diarrhea on Thursday as well, though he could not specify frequency or quantity.      A/P    >Syncope due to alcohol intoxication  counseled to stop drinking   ciwa, ativan prn   c/w folic acid and thiamine  TTE showed EF 35-40% - previous EF 60%  tele to r/o arrhthymias  Pt eval    >New systolic chf- not in exacerbation  c/w ACE, coreg, statin  defer further management as per cardiology     >Diarrhea - stool for C diff if still having diarrhea    >Alcohol abuse - as above    >CAD- c/w home medication     >Hypothyroidism - c/w synthroid - check TSH     >HLD - c/w statin     >DVt  PPX - Lovenox
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
## 2019-05-18 NOTE — ED ADULT NURSE REASSESSMENT NOTE - GENERAL PATIENT STATE
resting/sleeping/no change observed/comfortable appearance/cooperative/smiling/interactive
comfortable appearance/cooperative
no change observed/comfortable appearance/smiling/interactive/resting/sleeping/cooperative

## 2019-05-18 NOTE — ED ADULT NURSE REASSESSMENT NOTE - COMFORT CARE
plan of care explained/meal provided/assisted to bathroom/darkened lights/wait time explained/po fluids offered
plan of care explained/meal provided/wait time explained/assisted to bathroom/po fluids offered
plan of care explained/side rails up/warm blanket provided/repositioned

## 2019-05-18 NOTE — PROVIDER CONTACT NOTE (OTHER) - SITUATION
PT orders received. Chart reviewed, contents noted. Day note from NP: Margaritajennifer Mendiola indicates that she ambulated with pt, found pt to be independent, and PT consult no longer indicated. PT order

## 2019-05-18 NOTE — PROGRESS NOTE ADULT - SUBJECTIVE AND OBJECTIVE BOX
[place] [facility] - [facility], THE HEART CENTER                                   [address] [place] [address]                                                      PHONE: [phone]                                                         FAX: [phone]  [email or web address]  ---------------------------------------------------------------------------------------------------------------------------------    FU for  Pt seen and examined.     Overnight events/patient complaints:    No Known Allergies    MEDICATIONS  (STANDING):  carvedilol 3.125 milliGRAM(s) Oral every 12 hours  clopidogrel Tablet 75 milliGRAM(s) Oral daily  donepezil 10 milliGRAM(s) Oral daily  doxazosin 4 milliGRAM(s) Oral at bedtime  isosorbide   mononitrate ER Tablet (IMDUR) 60 milliGRAM(s) Oral at bedtime  levothyroxine 75 MICROGram(s) Oral daily  simvastatin 20 milliGRAM(s) Oral at bedtime    MEDICATIONS  (PRN):      Vital Signs Last 24 Hrs  T(C): 36.5 (18 May 2019 07:43), Max: 36.6 (17 May 2019 19:03)  T(F): 97.7 (18 May 2019 07:43), Max: 97.9 (17 May 2019 19:03)  HR: 69 (18 May 2019 07:43) (52 - 69)  BP: 117/75 (18 May 2019 07:43) (101/64 - 118/69)  BP(mean): --  RR: 18 (18 May 2019 07:43) (16 - 20)  SpO2: 97% (18 May 2019 07:43) (97% - 99%)  ICU Vital Signs Last 24 Hrs  I&O's Summary    17 May 2019 07:01  -  18 May 2019 07:00  --------------------------------------------------------  IN: 0 mL / OUT: 500 mL / NET: -500 mL        PHYSICAL EXAM:  General: well built, resting comfortably  HEENT: no JVD  CARDIOVASCULAR: Normal S1 and S2, No murmur, rub, gallop or lift.   LUNGS: No rales, rhonchi or wheeze. Normal breath sounds bilaterally.  ABDOMEN: Soft, nontender without mass or organomegaly. bowel sounds normoactive.  EXTREMITIES: no edema  Neuro: awake alert          LABS:                        13.7   7.7   )-----------( 141      ( 16 May 2019 20:49 )             41.8     05-16    140  |  107  |  25.0<H>  ----------------------------<  87  4.6   |  20.0<L>  |  1.20    Ca    9.0      16 May 2019 20:49    TPro  6.6  /  Alb  3.6  /  TBili  0.7  /  DBili  x   /  AST  26  /  ALT  7   /  AlkPhos  76  -    MARIAS LIMANY  CARDIAC MARKERS ( 16 May 2019 20:49 )  x     / <0.01 ng/mL / x     / x     / x          PT/INR - ( 16 May 2019 20:49 )   PT: 11.9 sec;   INR: 1.03 ratio         PTT - ( 16 May 2019 20:49 )  PTT:23.2 sec  Urinalysis Basic - ( 16 May 2019 23:49 )    Color: Yellow / Appearance: Clear / S.010 / pH: x  Gluc: x / Ketone: Negative  / Bili: Negative / Urobili: Negative mg/dL   Blood: x / Protein: Negative mg/dL / Nitrite: Negative   Leuk Esterase: Trace / RBC: 0-2 /HPF / WBC 0-2   Sq Epi: x / Non Sq Epi: Occasional / Bacteria: Occasional        RADIOLOGY & ADDITIONAL STUDIES:    LABS:                        13.7   7.7   )-----------( 141      ( 16 May 2019 20:49 )             41.8     05-16    140  |  107  |  25.0<H>  ----------------------------<  87  4.6   |  20.0<L>  |  1.20    Ca    9.0      16 May 2019 20:49    TPro  6.6  /  Alb  3.6  /  TBili  0.7  /  DBili  x   /  AST  26  /  ALT  7   /  AlkPhos  76  05-16    89y  CARDIAC MARKERS ( 16 May 2019 20:49 )  x     / <0.01 ng/mL / x     / x     / x          PT/INR - ( 16 May 2019 20:49 )   PT: 11.9 sec;   INR: 1.03 ratio         PTT - ( 16 May 2019 20:49 )  PTT:23.2 sec  Urinalysis Basic - ( 16 May 2019 23:49 )    Color: Yellow / Appearance: Clear / S.010 / pH: x  Gluc: x / Ketone: Negative  / Bili: Negative / Urobili: Negative mg/dL   Blood: x / Protein: Negative mg/dL / Nitrite: Negative   Leuk Esterase: Trace / RBC: 0-2 /HPF / WBC 0-2   Sq Epi: x / Non Sq Epi: Occasional / Bacteria: Occasional    Summary:   1. Left ventricular ejection fraction, by visual estimation, is 35 to   40%.   2. Moderately decreased global left ventricular systolic function.   3. Apical septal segment, apical inferior segment, and apex are abnormal   as described above.   4. Abnormal septal motion consistent with left bundle branch block.   5. The left ventricular diastolic function could not be assessed in this   study.   6. Normal left ventricular internal cavity size.   7. Severely enlarged left atrium.   8. Severely enlarged right atrium.   9. Mild to moderate mitral annular calcification.  10. Mild thickening and calcification of the anterior and posterior   mitral valve leaflets.  11. Moderate mitral valve regurgitation.  12. Calcified aortic valve with no evidence of significant stenosis.  13. Mild aortic regurgitation.  14. Moderate-severe tricuspid regurgitation.  15. Endocardial visualization was enhanced with intravenous echo contrast.  16. There is no evidence of pericardial effusion.    MD Joni Electronically signed on 2019 at 5:13:20 PM           Assessment and Plan:  In summary, MARISA DIOR is an 89y Male with past medical history significant for CAD s/p stent placement, PPM ([employer]) TIA, presents complaining of hypotension, and unresponsiveness. Daughter came home from work this evening and found patient in his chair slumped over and unresponsive. EMS noted him to be hypotensive (initail BP 69). Apparently had diarrhea two days ago. Patient not a good historian. Denies any recent dizziness/CP/SOB. Denies previous syncope. [place] [facility] - [facility], THE HEART CENTER                                   [address] [place] [address]                                                      PHONE: [phone]                                                         FAX: [phone]  [email or web address]  ---------------------------------------------------------------------------------------------------------------------------------    FU for  Pt seen and examined. Denies cp/sob/dizziness    Overnight events/patient complaints:    No Known Allergies    MEDICATIONS  (STANDING):  carvedilol 3.125 milliGRAM(s) Oral every 12 hours  clopidogrel Tablet 75 milliGRAM(s) Oral daily  donepezil 10 milliGRAM(s) Oral daily  doxazosin 4 milliGRAM(s) Oral at bedtime  isosorbide   mononitrate ER Tablet (IMDUR) 60 milliGRAM(s) Oral at bedtime  levothyroxine 75 MICROGram(s) Oral daily  simvastatin 20 milliGRAM(s) Oral at bedtime    MEDICATIONS  (PRN):      Vital Signs Last 24 Hrs  T(C): 36.5 (18 May 2019 07:43), Max: 36.6 (17 May 2019 19:03)  T(F): 97.7 (18 May 2019 07:43), Max: 97.9 (17 May 2019 19:03)  HR: 69 (18 May 2019 07:43) (52 - 69)  BP: 117/75 (18 May 2019 07:43) (101/64 - 118/69)  BP(mean): --  RR: 18 (18 May 2019 07:43) (16 - 20)  SpO2: 97% (18 May 2019 07:43) (97% - 99%)  ICU Vital Signs Last 24 Hrs  I&O's Summary    17 May 2019 07:01  -  18 May 2019 07:00  --------------------------------------------------------  IN: 0 mL / OUT: 500 mL / NET: -500 mL        PHYSICAL EXAM:  General: well built, resting comfortably  HEENT: no JVD  CARDIOVASCULAR: Normal S1 and S2, No murmur, rub, gallop or lift.   LUNGS: No rales, rhonchi or wheeze. Normal breath sounds bilaterally.  ABDOMEN: Soft, nontender without mass or organomegaly. bowel sounds normoactive.  EXTREMITIES: no edema  Neuro: awake alert          LABS:                        13.7   7.7   )-----------( 141      ( 16 May 2019 20:49 )             41.8     05-16    140  |  107  |  25.0<H>  ----------------------------<  87  4.6   |  20.0<L>  |  1.20    Ca    9.0      16 May 2019 20:49    TPro  6.6  /  Alb  3.6  /  TBili  0.7  /  DBili  x   /  AST  26  /  ALT  7   /  AlkPhos  76      MARISA DIOR  CARDIAC MARKERS ( 16 May 2019 20:49 )  x     / <0.01 ng/mL / x     / x     / x          PT/INR - ( 16 May 2019 20:49 )   PT: 11.9 sec;   INR: 1.03 ratio         PTT - ( 16 May 2019 20:49 )  PTT:23.2 sec  Urinalysis Basic - ( 16 May 2019 23:49 )    Color: Yellow / Appearance: Clear / S.010 / pH: x  Gluc: x / Ketone: Negative  / Bili: Negative / Urobili: Negative mg/dL   Blood: x / Protein: Negative mg/dL / Nitrite: Negative   Leuk Esterase: Trace / RBC: 0-2 /HPF / WBC 0-2   Sq Epi: x / Non Sq Epi: Occasional / Bacteria: Occasional        RADIOLOGY & ADDITIONAL STUDIES:    LABS:                        13.7   7.7   )-----------( 141      ( 16 May 2019 20:49 )             41.8     05-16    140  |  107  |  25.0<H>  ----------------------------<  87  4.6   |  20.0<L>  |  1.20    Ca    9.0      16 May 2019 20:49    TPro  6.6  /  Alb  3.6  /  TBili  0.7  /  DBili  x   /  AST  26  /  ALT  7   /  AlkPhos  76      89y  CARDIAC MARKERS ( 16 May 2019 20:49 )  x     / <0.01 ng/mL / x     / x     / x          PT/INR - ( 16 May 2019 20:49 )   PT: 11.9 sec;   INR: 1.03 ratio         PTT - ( 16 May 2019 20:49 )  PTT:23.2 sec  Urinalysis Basic - ( 16 May 2019 23:49 )    Color: Yellow / Appearance: Clear / S.010 / pH: x  Gluc: x / Ketone: Negative  / Bili: Negative / Urobili: Negative mg/dL   Blood: x / Protein: Negative mg/dL / Nitrite: Negative   Leuk Esterase: Trace / RBC: 0-2 /HPF / WBC 0-2   Sq Epi: x / Non Sq Epi: Occasional / Bacteria: Occasional    Summary:   1. Left ventricular ejection fraction, by visual estimation, is 35 to   40%.   2. Moderately decreased global left ventricular systolic function.   3. Apical septal segment, apical inferior segment, and apex are abnormal   as described above.   4. Abnormal septal motion consistent with left bundle branch block.   5. The left ventricular diastolic function could not be assessed in this   study.   6. Normal left ventricular internal cavity size.   7. Severely enlarged left atrium.   8. Severely enlarged right atrium.   9. Mild to moderate mitral annular calcification.  10. Mild thickening and calcification of the anterior and posterior   mitral valve leaflets.  11. Moderate mitral valve regurgitation.  12. Calcified aortic valve with no evidence of significant stenosis.  13. Mild aortic regurgitation.  14. Moderate-severe tricuspid regurgitation.  15. Endocardial visualization was enhanced with intravenous echo contrast.  16. There is no evidence of pericardial effusion.    MD Joni Electronically signed on 2019 at 5:13:20 PM           Assessment and Plan:  In summary, MARISA DIOR is an 89y Male with past medical history significant for CAD s/p stent placement, PPM ([employer]) TIA, presents complaining of hypotension, and unresponsiveness. Daughter came home from work this evening and found patient in his chair slumped over and unresponsive. EMS noted him to be hypotensive (initail BP 69). Apparently had diarrhea two days ago. Patient not a good historian. Denies any recent dizziness/CP/SOB. Denies previous syncope.   Syncope: Likely related to dehydration.   CAD/ CMP: plan to discuss with daughter about drop in EF on echo, previous ef normal. No signs of overt CHF

## 2019-05-18 NOTE — ED CDU PROVIDER DISPOSITION NOTE - CLINICAL COURSE
89 y/o M found slumped at table by daughter yesterday.  Found to be intoxicated in ED.  Troponin negative, ECHO with EF 35-40%.  Dr. Ortega saw patient he states that his EF was 60% last year and requires admission for further evaluation and work up.

## 2019-05-18 NOTE — ED CDU PROVIDER SUBSEQUENT DAY NOTE - PROGRESS NOTE DETAILS
89 y/o M found slumped at the table yesterday, intoxicated and hypotensive.  Patient states" I messed up and went on a freedman, my daughter is really mad at me".  Patient has no complaints, awake and alert, MAEx 4, no focal deficits.  No adverse events on CM.  Case Management involved in arranging disposition.  Await cardiology input s/p echo results as EF 20-30%. 89 y/o M found slumped at the table yesterday, intoxicated and hypotensive.  Patient states" I messed up and went on a freedman, my daughter is really mad at me".  Patient has no complaints, awake and alert, MAEx 4, no focal deficits.  No adverse events on CM.  Case Management involved in arranging disposition.  Await cardiology input s/p echo results as EF 35-40%. Originally told by RN that patient was unsteady on feet, unable to transfer, PT consult ordered.  I was able to ambulate patient.  He was independent and did not require any assistance.  PT consult cancelled.  I s/w  patient's daughter Rosina at length.  She will take patient home once cleared by cardiology.

## 2019-05-18 NOTE — CONSULT NOTE ADULT - SUBJECTIVE AND OBJECTIVE BOX
Information Obtained from: Patient who is a ppor historian, dtr at bedside, ED attending EHR    Patient is a 89y old  Male who presents with a chief complaint of syncope (18 May 2019 08:01)    HPI:  89yo M who was brought in after syncopal episode at home.  Patient came to ED on evening of .  Dtr came home from work and found him slumped over on chair, she could not get him to respond.  She called EMS, approx 2-3 hours after, patient started responding with 2-3 word answers.  He was noted to be intoxicated with alcohol.  He was seen by cardiology in ED and found to have an EF of 35-40% (previous EF of 60%).  Patient also had c diff approx 1 month ago and was treated (dtr did not know abx).  He had diarrhea on thursday as well, though he could not specify frequency or quantity.  No recent fevers, chills, sweats, cp, sob, palpitations, n/v, abd pain, calf pain, or focal weakness.    Baseline functional status:  Lives with daughter, sometimes uses cane    REVIEW OF SYSTEMS:  see HPI, all others neg    PAST MEDICAL & SURGICAL HISTORY:  Alcohol abuse  Dementia  Hypothyroidism  BPH (benign prostatic hypertrophy)  Stented coronary artery: x1  Cataract  TIA (transient ischemic attack)  Heart attack  Enlarged prostate  Coronary artery disease  Anginal pain  Reflux  Elective surgery: pt had bladder mass removed apporoximayly 6 weeks ago by Dr. Mccord  H/O cataract removal with insertion of prosthetic lens  Coronary artery disease: cardiac stents   H/O pilonidal cyst  H/O: hemorrhoidectomy  S/P appendectomy    SOCIAL HISTORY:  Lives: with dtr  Smoking Hx: none  ETOH Hx: intermittent binge drinking per dtr  Illicit Drug Use:  None    Allergies    No Known Allergies    Intolerances    Home Medications:  Reviewed wit dtr at bedside    Ambien 5 mg oral tablet:  (27 Aug 2018 20:21)  clopidogrel 75 mg oral tablet: 1 tab(s) orally once a day (27 Aug 2018 20:21)  donepezil 10 mg oral tablet: 1 tab(s) orally once a day (at bedtime) (27 Aug 2018 20:21)  isosorbide mononitrate 60 mg oral tablet, extended release: 1 tab(s) orally once a day (27 Aug 2018 20:21)  levothyroxine 75 mcg (0.075 mg) oral tablet: 1 tab(s) orally once a day (27 Aug 2018 20:21)  naproxen 375 mg oral tablet: orally 3 times a day (27 Aug 2018 20:21)  nitroglycerin: 0.4 milligram(s) orally , As Needed (27 Aug 2018 20:21)  omeprazole 20 mg oral delayed release capsule: 1 cap(s) orally once a day (27 Aug 2018 20:21)  ramipril 5 mg oral tablet:  (27 Aug 2018 20:21)  simvastatin 20 mg oral tablet: 1 tab(s) orally once a day (at bedtime) (27 Aug 2018 20:21)  terazosin 5 mg oral tablet: 1 tab(s) orally once a day (27 Aug 2018 20:21)    FAMILY HISTORY:  Family history of hypertension    PHYSICAL EXAM:  Vital Signs Last 24 Hrs  T(C): 36.6 (18 May 2019 11:34), Max: 36.6 (17 May 2019 19:03)  T(F): 97.8 (18 May 2019 11:34), Max: 97.9 (17 May 2019 19:03)  HR: 65 (18 May 2019 11:34) (52 - 69)  BP: 107/69 (18 May 2019 11:34) (101/64 - 118/69)  BP(mean): --  RR: 16 (18 May 2019 11:34) (16 - 20)  SpO2: 96% (18 May 2019 11:34) (96% - 99%)    GEN: Sitting in chair, awake, alert, oriented to person and place, not time, goes off on tangents  Tele-visit precluded further physical examination.    LABS:                        13.7   7.7   )-----------( 141      ( 16 May 2019 20:49 )             41.8       05-16    140  |  107  |  25.0<H>  ----------------------------<  87  4.6   |  20.0<L>  |  1.20    Ca    9.0      16 May 2019 20:49    TPro  6.6  /  Alb  3.6  /  TBili  0.7  /  DBili  x   /  AST  26  /  ALT  7   /  AlkPhos  76  05-16    Urinalysis Basic - ( 16 May 2019 23:49 )    Color: Yellow / Appearance: Clear / S.010 / pH: x  Gluc: x / Ketone: Negative  / Bili: Negative / Urobili: Negative mg/dL   Blood: x / Protein: Negative mg/dL / Nitrite: Negative   Leuk Esterase: Trace / RBC: 0-2 /HPF / WBC 0-2   Sq Epi: x / Non Sq Epi: Occasional / Bacteria: Occasional    PT/INR - ( 16 May 2019 20:49 )   PT: 11.9 sec;   INR: 1.03 ratio         PTT - ( 16 May 2019 20:49 )  PTT:23.2 sec    Lactate Trend      CARDIAC MARKERS ( 16 May 2019 20:49 )  x     / <0.01 ng/mL / x     / x     / x        < from: CT Head No Cont (19 @ 00:14) >  CT evidence of acute intracranial hemorrhage, mass effect or acute   territorial infarct.  No significant 2019.  The patient cannot have   an MRI due to pacemaker.  If neurologic symptoms persist, a repeat head   CT in 24 hours is recommended.    < end of copied text >    < from: TTE Echo Complete w/Doppler (19 @ 14:52) >  Summary:   1. Left ventricular ejection fraction, by visual estimation, is 35 to   40%.   2. Moderately decreased global left ventricular systolic function.   3. Apical septal segment, apical inferior segment, and apex are abnormal   as described above.   4. Abnormal septal motion consistent with left bundle branch block.   5. The left ventricular diastolic function could not be assessed in this   study.   6. Normal left ventricular internal cavity size.   7. Severely enlarged left atrium.   8.Severely enlarged right atrium.   9. Mild to moderate mitral annular calcification.  10. Mild thickening and calcification of the anterior and posterior   mitral valve leaflets.  11. Moderate mitral valve regurgitation.  12. Calcified aortic valve with no evidence of significant stenosis.  13. Mild aortic regurgitation.  14. Moderate-severe tricuspid regurgitation.  15. Endocardial visualization was enhanced with intravenous echo contrast.  16. There is no evidence of pericardial effusion.    < end of copied text >

## 2019-05-18 NOTE — H&P ADULT - NSICDXPASTSURGICALHX_GEN_ALL_CORE_FT
PAST SURGICAL HISTORY:  Coronary artery disease cardiac stents 2011    Elective surgery pt had bladder mass removed apporoximayly 6 weeks ago by Dr. Mccord    H/O cataract removal with insertion of prosthetic lens     H/O pilonidal cyst     H/O: hemorrhoidectomy     S/P appendectomy

## 2019-05-18 NOTE — ED ADULT NURSE REASSESSMENT NOTE - NS ED NURSE REASSESS COMMENT FT1
Pt alert and oriented, no apparent distress noted at this time. Pt handed off to sonia and nora RNs in stable condition.
Received report from BILLY Reilly. Assumed care of pt. Pt's VSS, in NAD.
Received report from BILLY Santiago. Pt's VSS, in NAD. Pt to be moved to CDU for observation.
Received report from off-going RN.  Patient awake, alert, orientedx3, in no apparent distress, offering no complaints at this time.  Awaiting further order from MD.  Will continue to monitor and reassess closely.
Report given to night shift CDU BILLY Reilly.
assumed care of pt @ 1930, report received from andrew and sonia RNs, charting as noted. Pt AOx4 in NAD, Vital Signs Stable. HR is NSR with occasional V pacing. lung sounds are clear b/l, abd is soft and nontender with positive bowel sounds in all four quadrants, skin is warm, dry and appropriate for age and race. pt educated on plan of care and observation stay. Plan of care taught back to RN. Proficiency determined from successful pt teach back. Pt oriented to unit, staff, and room. Pt reeducated on call bell use. Bed locked in lowest position, call bell within reach. All questions and concerns addressed.     Pt awaiting placement and social work followup
assumed care of pt from BILLY madsen, pt resting in stretcher offers no complaints on cardiac monitor, xray at bedside pending lab and radiology results.
pt resting in recliner in NAD. Warm blanket provided, lights darkened. Call bell in reach. All questions and concerns addressed.
Pt awaiting cardiology consult. Pt ambulated with assistance to bathroom, provided with PO fluids, lights darkened. Pt resting comfortably in chair. Will continue to monitor.
Pt A&Ox1, disoriented to time and place, pt forgetful, + pacemaker, on CM v-pacing with occasional PVC's / intermittent sinus, breath sounds clear B/L, breathing spontaneous, unlabored, + peripheral pulses x 4, + BS x 4 quad, abd soft, nontender, nondistended, skin warm, dry, intact, color WDL for race, + ecchymosis B/L upper extremities, 20G IV L FA flushes without difficulty, positive blood return, pt awaiting cardiology re-eval and case mgmt. Will continue to monitor.
pt attempting to get oob without assistance. pt with unsteady gait. Pt AOx2, disoriented to time and place at baseline. Pt demanding to get up and walk around. pt educated on fall precautions, bed locked in lowest position, call bell in reach. RN and NA rounding frequently to assess pt safety.

## 2019-05-18 NOTE — ED CDU PROVIDER SUBSEQUENT DAY NOTE - HISTORY
89 y/o M with CAD s/p stent placement, PPM ([employer]- placed August 2018), TIA, presents complaining of hypotension, and unresponsiveness. Daughter reports that she came home to find her father with his head down at the table around 7pm, she tried to speak to him and awake him by tapping him and he had no response, EMS was called, placed on stretcher and then verbally replied to EMS, pt was unconscious for unknown period of time, last contact time was 3:30pm.  Pt and daughter live together at home, daughter has been primary caretaker. Daughter reports approx 3 weeks ago after work when she came home, she states he fell at home and did not want to tell her. Pt states he slipped on floor and landed on side. States he had recent episode of diarrhea last week. Denies fever/chills, nausea/vomiting, cough, CP, palpitations,  SOB, dysuria.   PMD; Shasha, Cards: Monroe Florez

## 2019-05-18 NOTE — ED CDU PROVIDER SUBSEQUENT DAY NOTE - ATTENDING CONTRIBUTION TO CARE
I, Annalee Zhang, performed the initial face to face bedside interview with this patient regarding history of present illness, review of symptoms and relevant past medical, social and family history.  I completed an independent physical examination.  I was the initial provider who evaluated this patient. I have signed out the follow up of any pending tests (i.e. labs, radiological studies) to the ACP.  I have communicated the patient’s plan of care and disposition with the ACP.

## 2019-05-19 LAB
ANION GAP SERPL CALC-SCNC: 10 MMOL/L — SIGNIFICANT CHANGE UP (ref 5–17)
BUN SERPL-MCNC: 23 MG/DL — HIGH (ref 8–20)
CALCIUM SERPL-MCNC: 8.5 MG/DL — LOW (ref 8.6–10.2)
CHLORIDE SERPL-SCNC: 107 MMOL/L — SIGNIFICANT CHANGE UP (ref 98–107)
CO2 SERPL-SCNC: 20 MMOL/L — LOW (ref 22–29)
CREAT SERPL-MCNC: 0.96 MG/DL — SIGNIFICANT CHANGE UP (ref 0.5–1.3)
GLUCOSE SERPL-MCNC: 104 MG/DL — SIGNIFICANT CHANGE UP (ref 70–115)
HCT VFR BLD CALC: 39 % — LOW (ref 42–52)
HGB BLD-MCNC: 13 G/DL — LOW (ref 14–18)
MAGNESIUM SERPL-MCNC: 1.9 MG/DL — SIGNIFICANT CHANGE UP (ref 1.6–2.6)
MCHC RBC-ENTMCNC: 31.5 PG — HIGH (ref 27–31)
MCHC RBC-ENTMCNC: 33.3 G/DL — SIGNIFICANT CHANGE UP (ref 32–36)
MCV RBC AUTO: 94.4 FL — HIGH (ref 80–94)
PLATELET # BLD AUTO: 131 K/UL — LOW (ref 150–400)
POTASSIUM SERPL-MCNC: 4.4 MMOL/L — SIGNIFICANT CHANGE UP (ref 3.5–5.3)
POTASSIUM SERPL-SCNC: 4.4 MMOL/L — SIGNIFICANT CHANGE UP (ref 3.5–5.3)
RBC # BLD: 4.13 M/UL — LOW (ref 4.6–6.2)
RBC # FLD: 14.1 % — SIGNIFICANT CHANGE UP (ref 11–15.6)
SODIUM SERPL-SCNC: 137 MMOL/L — SIGNIFICANT CHANGE UP (ref 135–145)
T4 FREE SERPL-MCNC: 1.4 NG/DL — SIGNIFICANT CHANGE UP (ref 0.9–1.8)
TSH SERPL-MCNC: 6.62 UIU/ML — HIGH (ref 0.27–4.2)
WBC # BLD: 8.6 K/UL — SIGNIFICANT CHANGE UP (ref 4.8–10.8)
WBC # FLD AUTO: 8.6 K/UL — SIGNIFICANT CHANGE UP (ref 4.8–10.8)

## 2019-05-19 PROCEDURE — 99232 SBSQ HOSP IP/OBS MODERATE 35: CPT

## 2019-05-19 RX ORDER — LISINOPRIL 2.5 MG/1
10 TABLET ORAL DAILY
Refills: 0 | Status: DISCONTINUED | OUTPATIENT
Start: 2019-05-20 | End: 2019-05-20

## 2019-05-19 RX ORDER — ISOSORBIDE MONONITRATE 60 MG/1
30 TABLET, EXTENDED RELEASE ORAL DAILY
Refills: 0 | Status: DISCONTINUED | OUTPATIENT
Start: 2019-05-19 | End: 2019-05-21

## 2019-05-19 RX ORDER — CARVEDILOL PHOSPHATE 80 MG/1
3.12 CAPSULE, EXTENDED RELEASE ORAL EVERY 12 HOURS
Refills: 0 | Status: DISCONTINUED | OUTPATIENT
Start: 2019-05-19 | End: 2019-05-21

## 2019-05-19 RX ADMIN — Medication 1 TABLET(S): at 11:54

## 2019-05-19 RX ADMIN — LISINOPRIL 20 MILLIGRAM(S): 2.5 TABLET ORAL at 05:17

## 2019-05-19 RX ADMIN — DONEPEZIL HYDROCHLORIDE 10 MILLIGRAM(S): 10 TABLET, FILM COATED ORAL at 11:54

## 2019-05-19 RX ADMIN — PANTOPRAZOLE SODIUM 40 MILLIGRAM(S): 20 TABLET, DELAYED RELEASE ORAL at 05:17

## 2019-05-19 RX ADMIN — CLOPIDOGREL BISULFATE 75 MILLIGRAM(S): 75 TABLET, FILM COATED ORAL at 11:54

## 2019-05-19 RX ADMIN — Medication 75 MICROGRAM(S): at 05:17

## 2019-05-19 RX ADMIN — SIMVASTATIN 20 MILLIGRAM(S): 20 TABLET, FILM COATED ORAL at 22:47

## 2019-05-19 RX ADMIN — Medication 1 MILLIGRAM(S): at 11:54

## 2019-05-19 RX ADMIN — Medication 100 MILLIGRAM(S): at 11:54

## 2019-05-19 RX ADMIN — CARVEDILOL PHOSPHATE 3.12 MILLIGRAM(S): 80 CAPSULE, EXTENDED RELEASE ORAL at 05:17

## 2019-05-19 NOTE — PROGRESS NOTE ADULT - SUBJECTIVE AND OBJECTIVE BOX
[facility], [facility]                                   [address] [place] [address]                                                      PHONE: [phone]                                                         FAX: [phone]  [email or web address]  ---------------------------------------------------------------------------------------------------------------------------------    FU for  Pt seen and examined.     Overnight events/patient complaints:    No Known Allergies    MEDICATIONS  (STANDING):  carvedilol 3.125 milliGRAM(s) Oral every 12 hours  clopidogrel Tablet 75 milliGRAM(s) Oral daily  donepezil 10 milliGRAM(s) Oral daily  enoxaparin Injectable 40 milliGRAM(s) SubCutaneous every 24 hours  folic acid 1 milliGRAM(s) Oral daily  isosorbide   mononitrate ER Tablet (IMDUR) 60 milliGRAM(s) Oral at bedtime  levothyroxine 75 MICROGram(s) Oral daily  multivitamin 1 Tablet(s) Oral daily  pantoprazole    Tablet 40 milliGRAM(s) Oral before breakfast  simvastatin 20 milliGRAM(s) Oral at bedtime  tamsulosin 0.4 milliGRAM(s) Oral at bedtime  thiamine 100 milliGRAM(s) Oral daily    MEDICATIONS  (PRN):  LORazepam   Injectable 1 milliGRAM(s) IV Push every 1 hour PRN CIWA-Ar score 8 or greater      Vital Signs Last 24 Hrs  T(C): 36.7 (19 May 2019 11:06), Max: 36.7 (19 May 2019 11:06)  T(F): 98 (19 May 2019 11:06), Max: 98 (19 May 2019 11:06)  HR: 72 (19 May 2019 11:22) (65 - 89)  BP: 78/- (19 May 2019 11:22) (78/- - 126/76)  BP(mean): 50 (19 May 2019 11:22) (50 - 50)  RR: 18 (19 May 2019 11:06) (16 - 18)  SpO2: 95% (19 May 2019 11:06) (95% - 98%)  ICU Vital Signs Last 24 Hrs  I&O's Summary      PHYSICAL EXAM:  General: well built, resting comfortably  HEENT: no JVD  CARDIOVASCULAR: Normal S1 and S2, No murmur, rub, gallop or lift.   LUNGS: No rales, rhonchi or wheeze. Normal breath sounds bilaterally.  ABDOMEN: Soft, nontender without mass or organomegaly. bowel sounds normoactive.  EXTREMITIES: no edema  Neuro: awake alert          LABS:                        13.0   8.6   )-----------( 131      ( 19 May 2019 06:48 )             39.0     05-19    137  |  107  |  23.0<H>  ----------------------------<  104  4.4   |  20.0<L>  |  0.96    Ca    8.5<L>      19 May 2019 06:48  Mg     1.9     05-19    TPro  6.9  /  Alb  3.8  /  TBili  0.7  /  DBili  x   /  AST  22  /  ALT  6   /  AlkPhos  87  05-18    MARISA LIMANY  CARDIAC MARKERS ( 18 May 2019 19:24 )  x     / <0.01 ng/mL / x     / x     / x                RADIOLOGY & ADDITIONAL STUDIES:    LABS:                        13.0   8.6   )-----------( 131      ( 19 May 2019 06:48 )             39.0     05-19    137  |  107  |  23.0<H>  ----------------------------<  104  4.4   |  20.0<L>  |  0.96    Ca    8.5<L>      19 May 2019 06:48  Mg     1.9     05-19    TPro  6.9  /  Alb  3.8  /  TBili  0.7  /  DBili  x   /  AST  22  /  ALT  6   /  AlkPhos  87  05-18    89y  CARDIAC MARKERS ( 18 May 2019 19:24 )  x     / <0.01 ng/mL / x     / x     / x                Assessment and Plan: [facility]                                   [address] [place] [address]                                                      PHONE: [phone]                                                         FAX: [phone]  [email or web address]  ---------------------------------------------------------------------------------------------------------------------------------    FU for  Pt seen and examined. Sitting comfortably in chair, denies any complaints    Overnight events/patient complaints:    No Known Allergies    MEDICATIONS  (STANDING):  carvedilol 3.125 milliGRAM(s) Oral every 12 hours  clopidogrel Tablet 75 milliGRAM(s) Oral daily  donepezil 10 milliGRAM(s) Oral daily  enoxaparin Injectable 40 milliGRAM(s) SubCutaneous every 24 hours  folic acid 1 milliGRAM(s) Oral daily  isosorbide   mononitrate ER Tablet (IMDUR) 60 milliGRAM(s) Oral at bedtime  levothyroxine 75 MICROGram(s) Oral daily  multivitamin 1 Tablet(s) Oral daily  pantoprazole    Tablet 40 milliGRAM(s) Oral before breakfast  simvastatin 20 milliGRAM(s) Oral at bedtime  tamsulosin 0.4 milliGRAM(s) Oral at bedtime  thiamine 100 milliGRAM(s) Oral daily    MEDICATIONS  (PRN):  LORazepam   Injectable 1 milliGRAM(s) IV Push every 1 hour PRN CIWA-Ar score 8 or greater      Vital Signs Last 24 Hrs  T(C): 36.7 (19 May 2019 11:06), Max: 36.7 (19 May 2019 11:06)  T(F): 98 (19 May 2019 11:06), Max: 98 (19 May 2019 11:06)  HR: 72 (19 May 2019 11:22) (65 - 89)  BP:  (19 May 2019 11:22) ( 126/76)  RR: 18 (19 May 2019 11:06) (16 - 18)  SpO2: 95% (19 May 2019 11:06) (95% - 98%)  ICU Vital Signs Last 24 Hrs  I&O's Summary      PHYSICAL EXAM:  General: well built, resting comfortably  HEENT: no JVD  CARDIOVASCULAR: Normal S1 and S2, No murmur, rub, gallop or lift.   LUNGS: No rales, rhonchi or wheeze. Normal breath sounds bilaterally.  ABDOMEN: Soft, nontender without mass or organomegaly. bowel sounds normoactive.  EXTREMITIES: no edema  Neuro: awake alert          LABS:                        13.0   8.6   )-----------( 131      ( 19 May 2019 06:48 )             39.0     05-19    137  |  107  |  23.0<H>  ----------------------------<  104  4.4   |  20.0<L>  |  0.96    Ca    8.5<L>      19 May 2019 06:48  Mg     1.9     05-19    TPro  6.9  /  Alb  3.8  /  TBili  0.7  /  DBili  x   /  AST  22  /  ALT  6   /  AlkPhos  87  05-18    MARISA BARBY  CARDIAC MARKERS ( 18 May 2019 19:24 )  x     / <0.01 ng/mL / x     / x     / x                RADIOLOGY & ADDITIONAL STUDIES:    LABS:                        13.0   8.6   )-----------( 131      ( 19 May 2019 06:48 )             39.0     05-19    137  |  107  |  23.0<H>  ----------------------------<  104  4.4   |  20.0<L>  |  0.96    Ca    8.5<L>      19 May 2019 06:48  Mg     1.9     05-19    TPro  6.9  /  Alb  3.8  /  TBili  0.7  /  DBili  x   /  AST  22  /  ALT  6   /  AlkPhos  87  05-18    89y  CARDIAC MARKERS ( 18 May 2019 19:24 )  x     / <0.01 ng/mL / x     / x     / x          Summary:   1. Left ventricular ejection fraction, by visual estimation, is 35 to   40%.   2. Moderately decreased global left ventricular systolic function.   3. Apical septal segment, apical inferior segment, and apex are abnormal   as described above.   4. Abnormal septal motion consistent with left bundle branch block.   5. The left ventricular diastolic function could not be assessed in this   study.   6. Normal left ventricular internal cavity size.   7. Severely enlarged left atrium.   8. Severely enlarged right atrium.   9. Mild to moderate mitral annular calcification.  10. Mild thickening and calcification of the anterior and posterior   mitral valve leaflets.  11. Moderate mitral valve regurgitation.  12. Calcified aortic valve with no evidence of significant stenosis.  13. Mild aortic regurgitation.  14. Moderate-severe tricuspid regurgitation.  15. Endocardial visualization was enhanced with intravenous echo contrast.  16. There is no evidence of pericardial effusion.    MD Joni Electronically signed on 5/17/2019 at 5:13:20 PM      Assessment and Plan:  In summary, MARISA DIOR is an 89y Male with past medical history significant for CAD s/p stent placement, PPM ([employer]) TIA, presents complaining of hypotension, and unresponsiveness. Daughter came home from work this evening and found patient in his chair slumped over and unresponsive. EMS noted him to be hypotensive (initail BP 69). Apparently had diarrhea two days ago. Patient not a good historian. Denies any recent dizziness/CP/SOB. Denies previous syncope.   Syncope: Likely related to dehydration/alcohol intoxication (alcohol level 150)  CAD/ CMP: New onset drop in ef. D/w patients daughter in detail and given patients age and lack of symptoms, plan is conservative management. No signs of overt CHF.

## 2019-05-19 NOTE — PROGRESS NOTE ADULT - SUBJECTIVE AND OBJECTIVE BOX
Internal Medicine Hospitalist - Dr. Brando DIOR    [id number]    89y      Male    Patient is a 89y old  Male who presents with a chief complaint of syncope (18 May 2019 16:08)      INTERVAL HPI/ OVERNIGHT EVENTS: Patient is seen and examined, denied chest pain, SOB, abd. pain, nausea, vomiting and diarrhea    REVIEW OF SYSTEMS:    Denied fever, chills, abd. pain, nausea, vomiting, chest pain, SOB, headache, dizziness    PHYSICAL EXAM:    Vital Signs Last 24 Hrs  T(C): 36.6 (19 May 2019 04:35), Max: 36.6 (18 May 2019 11:34)  T(F): 97.9 (19 May 2019 04:35), Max: 97.9 (19 May 2019 04:35)  HR: 75 (19 May 2019 04:35) (65 - 89)  BP: 109/73 (19 May 2019 04:35) (107/63 - 126/76)  BP(mean): --  RR: 18 (19 May 2019 04:35) (16 - 18)  SpO2: 98% (19 May 2019 04:35) (96% - 98%)    GENERAL: NAD  CHEST/LUNG: CTA b/l   HEART: S1S2+ audible  ABDOMEN: Soft, Nontender, Nondistended; Bowel sounds present  EXTREMITIES:  no edema  CNS: AAO X 3  Psychiatry: normal mood    LABS:                        13.0   8.6   )-----------( 131      ( 19 May 2019 06:48 )             39.0     05-19    137  |  107  |  23.0<H>  ----------------------------<  104  4.4   |  20.0<L>  |  0.96    Ca    8.5<L>      19 May 2019 06:48  Mg     1.9     05-19    TPro  6.9  /  Alb  3.8  /  TBili  0.7  /  DBili  x   /  AST  22  /  ALT  6   /  AlkPhos  87  05-18            MEDICATIONS  (STANDING):  carvedilol 3.125 milliGRAM(s) Oral every 12 hours  clopidogrel Tablet 75 milliGRAM(s) Oral daily  donepezil 10 milliGRAM(s) Oral daily  enoxaparin Injectable 40 milliGRAM(s) SubCutaneous every 24 hours  folic acid 1 milliGRAM(s) Oral daily  isosorbide   mononitrate ER Tablet (IMDUR) 60 milliGRAM(s) Oral at bedtime  levothyroxine 75 MICROGram(s) Oral daily  multivitamin 1 Tablet(s) Oral daily  pantoprazole    Tablet 40 milliGRAM(s) Oral before breakfast  simvastatin 20 milliGRAM(s) Oral at bedtime  tamsulosin 0.4 milliGRAM(s) Oral at bedtime  thiamine 100 milliGRAM(s) Oral daily    MEDICATIONS  (PRN):  LORazepam   Injectable 1 milliGRAM(s) IV Push every 1 hour PRN CIWA-Ar score 8 or greater      RADIOLOGY & ADDITIONAL TEST

## 2019-05-19 NOTE — PROGRESS NOTE ADULT - ASSESSMENT
This is 89YO Man with PMH of CAd s/p stent, PPM, dementia, hypothyroidism  who was brought to ED due to syncope and alcohol intoxication.   Patient came to ED on evening of 5/16. Patient is a poor historian. Per ED note Daughter came home from work and found him slumped over on chair, she could not get him to respond. EMS noted him to be hypotensive (initail BP 69). Apparently had diarrhea two days ago.    He was noted to be intoxicated with alcohol, serum alcohol was 150. He was placed on observation. He was seen by cardiology in ED and found to have an EF of 35-40% (previous EF of 60%).  Patient also had c diff approx 1 month ago and was treated.  He had diarrhea on Thursday as well, though he could not specify frequency or quantity.      A/P    >Syncope due to alcohol intoxication  counseled to stop drinking   ciwa, ativan prn   c/w folic acid and thiamine  TTE showed EF 35-40% - previous EF 60%  tele to r/o arrhthymias  carotid doppler  Pt eval    >New systolic chf- not in exacerbation  c/w ACE, coreg, statin  defer further management as per cardiology     >Diarrhea - resolved    >Alcohol abuse - as above    >HTN - noted soft BP, decrease lisinopril 10mg    >CAD- c/w home medication     >Hypothyroidism - c/w synthroid -  TSH 6.62 -?compliance  f/u repeat TFT in 4-6 weeks    >HLD - c/w statin     >DVt  PPX - Lovenox

## 2019-05-20 PROCEDURE — 99233 SBSQ HOSP IP/OBS HIGH 50: CPT

## 2019-05-20 PROCEDURE — 93880 EXTRACRANIAL BILAT STUDY: CPT | Mod: 26

## 2019-05-20 RX ORDER — LISINOPRIL 2.5 MG/1
2.5 TABLET ORAL DAILY
Refills: 0 | Status: DISCONTINUED | OUTPATIENT
Start: 2019-05-20 | End: 2019-05-21

## 2019-05-20 RX ADMIN — DONEPEZIL HYDROCHLORIDE 10 MILLIGRAM(S): 10 TABLET, FILM COATED ORAL at 11:37

## 2019-05-20 RX ADMIN — CARVEDILOL PHOSPHATE 3.12 MILLIGRAM(S): 80 CAPSULE, EXTENDED RELEASE ORAL at 17:35

## 2019-05-20 RX ADMIN — Medication 1 MILLIGRAM(S): at 11:37

## 2019-05-20 RX ADMIN — Medication 75 MICROGRAM(S): at 07:13

## 2019-05-20 RX ADMIN — CLOPIDOGREL BISULFATE 75 MILLIGRAM(S): 75 TABLET, FILM COATED ORAL at 11:37

## 2019-05-20 RX ADMIN — SIMVASTATIN 20 MILLIGRAM(S): 20 TABLET, FILM COATED ORAL at 22:04

## 2019-05-20 RX ADMIN — Medication 1 TABLET(S): at 11:37

## 2019-05-20 RX ADMIN — ENOXAPARIN SODIUM 40 MILLIGRAM(S): 100 INJECTION SUBCUTANEOUS at 23:14

## 2019-05-20 RX ADMIN — PANTOPRAZOLE SODIUM 40 MILLIGRAM(S): 20 TABLET, DELAYED RELEASE ORAL at 07:13

## 2019-05-20 RX ADMIN — Medication 100 MILLIGRAM(S): at 11:38

## 2019-05-20 NOTE — PROGRESS NOTE ADULT - SUBJECTIVE AND OBJECTIVE BOX
[facility], [facility]                              [address] [address]                                                 PHONE: [phone]                                                 FAX: [phone]  -----------------------------------------------------------------------------------------------  Pt seen and examined. FU for CMP    Overnight events/Complaints: Pt without complains. Does not recall why he came to the hospital.    Vital Signs Last 24 Hrs  T(C): 36.3 (20 May 2019 06:54), Max: 36.7 (19 May 2019 11:06)  T(F): 97.4 (20 May 2019 06:54), Max: 98 (19 May 2019 11:06)  HR: 66 (20 May 2019 08:30) (62 - 80)  BP: 88/50 (20 May 2019 08:30) (78/50 - 114/52)  BP(mean): --  RR: 18 (20 May 2019 08:30) (18 - 18)  SpO2: 96% (20 May 2019 08:30) (95% - 98%)  I&O's Summary    19 May 2019 07:01  -  20 May 2019 07:00  --------------------------------------------------------  IN: 240 mL / OUT: 1000 mL / NET: -760 mL        PHYSICAL EXAM:  Constitutional: Appears well developed, well nourished; alert and co-operative  HEENT:     Head: Normocephalic and atraumatic  Neck: supple. No JVD  Cardiovascular: regular S1 S2  Respiratory: Lungs clear to auscultation; no crepitations, no wheeze  Gastrointestinal:  Soft, Non-tender, + BS	  Musculoskeletal: Normal range of motion. No edema  Skin: Warm and dry. No cyanosis . No diaphoresis.  Neurologic: Alert oriented to time place and person. Normal strength and no tremor.        LABS:                        13.0   8.6   )-----------( 131      ( 19 May 2019 06:48 )             39.0     05-19    137  |  107  |  23.0<H>  ----------------------------<  104  4.4   |  20.0<L>  |  0.96    Ca    8.5<L>      19 May 2019 06:48  Mg     1.9     05-19    TPro  6.9  /  Alb  3.8  /  TBili  0.7  /  DBili  x   /  AST  22  /  ALT  6   /  AlkPhos  87  05-18    CARDIAC MARKERS ( 18 May 2019 19:24 )  x     / <0.01 ng/mL / x     / x     / x              RADIOLOGY & ADDITIONAL STUDIES: (reviewed)    CARDIOLOGY TESTING:(reviewed)     TELEMETRY (Independent visualization) : No arrhythmias    ECHOCARDIOGRAM:  < from: TTE Echo Complete w/Doppler (05.17.19 @ 14:52) >  Summary:   1. Left ventricular ejection fraction, by visual estimation, is 35 to   40%.   2. Moderately decreased global left ventricular systolic function.   3. Apical septal segment, apical inferior segment, and apex are abnormal   as described above.   4. Abnormal septal motion consistent with left bundle branch block.   5. The left ventricular diastolic function could not be assessed in this   study.   6. Normal left ventricular internal cavity size.   7. Severely enlarged left atrium.   8.Severely enlarged right atrium.   9. Mild to moderate mitral annular calcification.  10. Mild thickening and calcification of the anterior and posterior   mitral valve leaflets.  11. Moderate mitral valve regurgitation.  12. Calcified aortic valve with no evidence of significant stenosis.  13. Mild aortic regurgitation.  14. Moderate-severe tricuspid regurgitation.  15. Endocardial visualization was enhanced with intravenous echo contrast.  16. There is no evidence of pericardial effusion.    MD Joni Electronically signed on 5/17/2019 at 5:13:20 PM    < end of copied text >    MEDICATIONS:(reviewed)  MEDICATIONS  (STANDING):  carvedilol 3.125 milliGRAM(s) Oral every 12 hours  clopidogrel Tablet 75 milliGRAM(s) Oral daily  donepezil 10 milliGRAM(s) Oral daily  enoxaparin Injectable 40 milliGRAM(s) SubCutaneous every 24 hours  folic acid 1 milliGRAM(s) Oral daily  isosorbide   mononitrate ER Tablet (IMDUR) 30 milliGRAM(s) Oral daily  levothyroxine 75 MICROGram(s) Oral daily  lisinopril 10 milliGRAM(s) Oral daily  multivitamin 1 Tablet(s) Oral daily  pantoprazole    Tablet 40 milliGRAM(s) Oral before breakfast  simvastatin 20 milliGRAM(s) Oral at bedtime  thiamine 100 milliGRAM(s) Oral daily    MEDICATIONS  (PRN):  LORazepam   Injectable 1 milliGRAM(s) IV Push every 1 hour PRN CIWA-Ar score 8 or greater    ASSESSMENT AND PLAN:    89y Male with past medical history of CAD s/p stent placement, PPM ([employer]) TIA, presents complaining of hypotension, and unresponsiveness. Daughter came home from work this evening and found patient in his chair slumped over and unresponsive. EMS noted him to be hypotensive (initial: BP 69).     Syncope: Likely related to dehydration/alcohol intoxication (alcohol level 150)    CAD/ CMP: New onset drop in EF. Possibly secondary to RV pacing and from paradoxical septal motion. Plan is conservative management. No signs of overt CHF.   Continue Coreg/lisinopril with Imdur [facility], [facility]                              [address] [address]                                                 PHONE: [phone]                                                 FAX: [phone]  -----------------------------------------------------------------------------------------------  Pt seen and examined. FU for CMP    Overnight events/Complaints: Pt without complains. Does not recall why he came to the hospital.    Vital Signs Last 24 Hrs  T(C): 36.3 (20 May 2019 06:54), Max: 36.7 (19 May 2019 11:06)  T(F): 97.4 (20 May 2019 06:54), Max: 98 (19 May 2019 11:06)  HR: 66 (20 May 2019 08:30) (62 - 80)  BP: 88/50 (20 May 2019 08:30) (78/50 - 114/52)  BP(mean): --  RR: 18 (20 May 2019 08:30) (18 - 18)  SpO2: 96% (20 May 2019 08:30) (95% - 98%)  I&O's Summary    19 May 2019 07:01  -  20 May 2019 07:00  --------------------------------------------------------  IN: 240 mL / OUT: 1000 mL / NET: -760 mL        PHYSICAL EXAM:  Constitutional: Appears well developed, well nourished; alert and co-operative  HEENT:     Head: Normocephalic and atraumatic  Neck: supple. No JVD  Cardiovascular: regular S1 S2  Respiratory: Lungs clear to auscultation; no crepitations, no wheeze  Gastrointestinal:  Soft, Non-tender, + BS	  Musculoskeletal: Normal range of motion. No edema  Skin: Warm and dry. No cyanosis . No diaphoresis.  Neurologic: Alert oriented to time place and person. Normal strength and no tremor.        LABS:                        13.0   8.6   )-----------( 131      ( 19 May 2019 06:48 )             39.0     05-19    137  |  107  |  23.0<H>  ----------------------------<  104  4.4   |  20.0<L>  |  0.96    Ca    8.5<L>      19 May 2019 06:48  Mg     1.9     05-19    TPro  6.9  /  Alb  3.8  /  TBili  0.7  /  DBili  x   /  AST  22  /  ALT  6   /  AlkPhos  87  05-18    CARDIAC MARKERS ( 18 May 2019 19:24 )  x     / <0.01 ng/mL / x     / x     / x              RADIOLOGY & ADDITIONAL STUDIES: (reviewed)    CARDIOLOGY TESTING:(reviewed)     TELEMETRY (Independent visualization) : No arrhythmias    ECHOCARDIOGRAM:  < from: TTE Echo Complete w/Doppler (05.17.19 @ 14:52) >  Summary:   1. Left ventricular ejection fraction, by visual estimation, is 35 to   40%.   2. Moderately decreased global left ventricular systolic function.   3. Apical septal segment, apical inferior segment, and apex are abnormal   as described above.   4. Abnormal septal motion consistent with left bundle branch block.   5. The left ventricular diastolic function could not be assessed in this   study.   6. Normal left ventricular internal cavity size.   7. Severely enlarged left atrium.   8.Severely enlarged right atrium.   9. Mild to moderate mitral annular calcification.  10. Mild thickening and calcification of the anterior and posterior   mitral valve leaflets.  11. Moderate mitral valve regurgitation.  12. Calcified aortic valve with no evidence of significant stenosis.  13. Mild aortic regurgitation.  14. Moderate-severe tricuspid regurgitation.  15. Endocardial visualization was enhanced with intravenous echo contrast.  16. There is no evidence of pericardial effusion.    MD Joni Electronically signed on 5/17/2019 at 5:13:20 PM    < end of copied text >    MEDICATIONS:(reviewed)  MEDICATIONS  (STANDING):  carvedilol 3.125 milliGRAM(s) Oral every 12 hours  clopidogrel Tablet 75 milliGRAM(s) Oral daily  donepezil 10 milliGRAM(s) Oral daily  enoxaparin Injectable 40 milliGRAM(s) SubCutaneous every 24 hours  folic acid 1 milliGRAM(s) Oral daily  isosorbide   mononitrate ER Tablet (IMDUR) 30 milliGRAM(s) Oral daily  levothyroxine 75 MICROGram(s) Oral daily  lisinopril 10 milliGRAM(s) Oral daily  multivitamin 1 Tablet(s) Oral daily  pantoprazole    Tablet 40 milliGRAM(s) Oral before breakfast  simvastatin 20 milliGRAM(s) Oral at bedtime  thiamine 100 milliGRAM(s) Oral daily    MEDICATIONS  (PRN):  LORazepam   Injectable 1 milliGRAM(s) IV Push every 1 hour PRN CIWA-Ar score 8 or greater    ASSESSMENT AND PLAN:    89y Male with past medical history of CAD s/p stent placement, PPM ([employer]) TIA, presents complaining of hypotension, and unresponsiveness. Daughter came home from work this evening and found patient in his chair slumped over and unresponsive. EMS noted him to be hypotensive (initial: BP 69).     Syncope: Likely related to dehydration/alcohol intoxication (alcohol level 150)    CAD/ CMP: New onset drop in EF. Possibly secondary to RV pacing and from paradoxical septal motion. Plan is conservative management. No signs of overt CHF.   Continue Coreg/lisinopril with Imdur    Low BP: decrease lisinopril to 2.5 mg daily

## 2019-05-20 NOTE — PROGRESS NOTE ADULT - SUBJECTIVE AND OBJECTIVE BOX
Internal Medicine Hospitalist - Dr. Brando DIOR    [id number]    89y      Male    Patient is a 89y old  Male who presents with a chief complaint of syncope (18 May 2019 16:08)      INTERVAL HPI/ OVERNIGHT EVENTS: Patient is seen and examined, denied chest pain, SOB, abd. pain, nausea, vomiting and diarrhea    REVIEW OF SYSTEMS:    Denied fever, chills, abd. pain, nausea, vomiting, chest pain, SOB, headache, dizziness    PHYSICAL EXAM:    Vital Signs Last 24 Hrs  T(C): 36.6 (19 May 2019 04:35), Max: 36.6 (18 May 2019 11:34)  T(F): 97.9 (19 May 2019 04:35), Max: 97.9 (19 May 2019 04:35)  HR: 75 (19 May 2019 04:35) (65 - 89)  BP: 109/73 (19 May 2019 04:35) (107/63 - 126/76)  BP(mean): --  RR: 18 (19 May 2019 04:35) (16 - 18)  SpO2: 98% (19 May 2019 04:35) (96% - 98%)    GENERAL: NAD  CHEST/LUNG: CTA b/l   HEART: S1S2+ audible  ABDOMEN: Soft, Nontender, Nondistended; Bowel sounds present  EXTREMITIES:  no edema  CNS: AAO X 3  Psychiatry: normal mood    LABS:                        13.0   8.6   )-----------( 131      ( 19 May 2019 06:48 )             39.0     05-19    137  |  107  |  23.0<H>  ----------------------------<  104  4.4   |  20.0<L>  |  0.96    Ca    8.5<L>      19 May 2019 06:48  Mg     1.9     05-19    TPro  6.9  /  Alb  3.8  /  TBili  0.7  /  DBili  x   /  AST  22  /  ALT  6   /  AlkPhos  87  05-18            MEDICATIONS  (STANDING):  carvedilol 3.125 milliGRAM(s) Oral every 12 hours  clopidogrel Tablet 75 milliGRAM(s) Oral daily  donepezil 10 milliGRAM(s) Oral daily  enoxaparin Injectable 40 milliGRAM(s) SubCutaneous every 24 hours  folic acid 1 milliGRAM(s) Oral daily  isosorbide   mononitrate ER Tablet (IMDUR) 60 milliGRAM(s) Oral at bedtime  levothyroxine 75 MICROGram(s) Oral daily  multivitamin 1 Tablet(s) Oral daily  pantoprazole    Tablet 40 milliGRAM(s) Oral before breakfast  simvastatin 20 milliGRAM(s) Oral at bedtime  tamsulosin 0.4 milliGRAM(s) Oral at bedtime  thiamine 100 milliGRAM(s) Oral daily    MEDICATIONS  (PRN):  LORazepam   Injectable 1 milliGRAM(s) IV Push every 1 hour PRN CIWA-Ar score 8 or greater      RADIOLOGY & ADDITIONAL TEST

## 2019-05-20 NOTE — PROGRESS NOTE ADULT - ASSESSMENT
This is 89YO Man with PMH of CAd s/p stent, PPM, dementia, hypothyroidism  who was brought to ED due to syncope and alcohol intoxication.   Patient came to ED on evening of 5/16. Patient is a poor historian. Per ED note Daughter came home from work and found him slumped over on chair, she could not get him to respond. EMS noted him to be hypotensive (initail BP 69). Apparently had diarrhea two days ago.    He was noted to be intoxicated with alcohol, serum alcohol was 150. He was placed on observation. He was seen by cardiology in ED and found to have an EF of 35-40% (previous EF of 60%).  Patient also had c diff approx 1 month ago and was treated.  He had diarrhea on Thursday as well, though he could not specify frequency or quantity.      A/P    >Syncope due to alcohol intoxication along with GI losses & low BP  counseled to stop drinking   ciwa, ativan prn   c/w folic acid and thiamine  TTE showed EF 35-40% - previous EF 60%.  Possibly secondary to RV pacing and from paradoxical septal motion. Plan is conservative management. No signs of overt CHF.   tele to r/o arrhthymias  carotid doppler with b/l < 50% stenosis  Pt eval    >Hypotention-  Pt asymptomatic. decrease lisinopril to 2.5 mg daily, c/w coreg 3.125 bid, imdur 30 (with parameters), d/c if not tolerated    >New systolic chf- not in exacerbation  c/w ACE, coreg, statin  defer further management as per cardiology     >Diarrhea - resolved    >Alcohol abuse - as above. CIWA protocol. SW consult    >HTN - noted soft BP, decrease lisinopril as above, c/w coreg 3.125 bid, imdur 30 (with parameters), d/c if not tolerated    >CAD- c/w home medication     >Hypothyroidism - c/w synthroid -  TSH 6.62 -?compliance  f/u repeat TFT in 4-6 weeks    >HLD - c/w statin     >DVt  PPX - Lovenox

## 2019-05-21 ENCOUNTER — TRANSCRIPTION ENCOUNTER (OUTPATIENT)
Age: 84
End: 2019-05-21

## 2019-05-21 VITALS
RESPIRATION RATE: 17 BRPM | DIASTOLIC BLOOD PRESSURE: 76 MMHG | OXYGEN SATURATION: 97 % | TEMPERATURE: 98 F | SYSTOLIC BLOOD PRESSURE: 113 MMHG | HEART RATE: 78 BPM

## 2019-05-21 LAB
ANION GAP SERPL CALC-SCNC: 11 MMOL/L — SIGNIFICANT CHANGE UP (ref 5–17)
BASOPHILS # BLD AUTO: 0 K/UL — SIGNIFICANT CHANGE UP (ref 0–0.2)
BASOPHILS NFR BLD AUTO: 0.5 % — SIGNIFICANT CHANGE UP (ref 0–2)
BUN SERPL-MCNC: 35 MG/DL — HIGH (ref 8–20)
CALCIUM SERPL-MCNC: 9 MG/DL — SIGNIFICANT CHANGE UP (ref 8.6–10.2)
CHLORIDE SERPL-SCNC: 108 MMOL/L — HIGH (ref 98–107)
CO2 SERPL-SCNC: 23 MMOL/L — SIGNIFICANT CHANGE UP (ref 22–29)
CREAT SERPL-MCNC: 1.49 MG/DL — HIGH (ref 0.5–1.3)
EOSINOPHIL # BLD AUTO: 0.4 K/UL — SIGNIFICANT CHANGE UP (ref 0–0.5)
EOSINOPHIL NFR BLD AUTO: 4.3 % — SIGNIFICANT CHANGE UP (ref 0–6)
GLUCOSE SERPL-MCNC: 93 MG/DL — SIGNIFICANT CHANGE UP (ref 70–115)
HCT VFR BLD CALC: 40 % — LOW (ref 42–52)
HGB BLD-MCNC: 13.6 G/DL — LOW (ref 14–18)
LYMPHOCYTES # BLD AUTO: 2 K/UL — SIGNIFICANT CHANGE UP (ref 1–4.8)
LYMPHOCYTES # BLD AUTO: 20.6 % — SIGNIFICANT CHANGE UP (ref 20–55)
MAGNESIUM SERPL-MCNC: 2 MG/DL — SIGNIFICANT CHANGE UP (ref 1.6–2.6)
MCHC RBC-ENTMCNC: 31.9 PG — HIGH (ref 27–31)
MCHC RBC-ENTMCNC: 34 G/DL — SIGNIFICANT CHANGE UP (ref 32–36)
MCV RBC AUTO: 93.7 FL — SIGNIFICANT CHANGE UP (ref 80–94)
MONOCYTES # BLD AUTO: 1 K/UL — HIGH (ref 0–0.8)
MONOCYTES NFR BLD AUTO: 10.1 % — HIGH (ref 3–10)
NEUTROPHILS # BLD AUTO: 6.3 K/UL — SIGNIFICANT CHANGE UP (ref 1.8–8)
NEUTROPHILS NFR BLD AUTO: 64.2 % — SIGNIFICANT CHANGE UP (ref 37–73)
PHOSPHATE SERPL-MCNC: 3.5 MG/DL — SIGNIFICANT CHANGE UP (ref 2.4–4.7)
PLATELET # BLD AUTO: 126 K/UL — LOW (ref 150–400)
POTASSIUM SERPL-MCNC: 4.8 MMOL/L — SIGNIFICANT CHANGE UP (ref 3.5–5.3)
POTASSIUM SERPL-SCNC: 4.8 MMOL/L — SIGNIFICANT CHANGE UP (ref 3.5–5.3)
RBC # BLD: 4.27 M/UL — LOW (ref 4.6–6.2)
RBC # FLD: 14.1 % — SIGNIFICANT CHANGE UP (ref 11–15.6)
SODIUM SERPL-SCNC: 142 MMOL/L — SIGNIFICANT CHANGE UP (ref 135–145)
WBC # BLD: 9.8 K/UL — SIGNIFICANT CHANGE UP (ref 4.8–10.8)
WBC # FLD AUTO: 9.8 K/UL — SIGNIFICANT CHANGE UP (ref 4.8–10.8)

## 2019-05-21 PROCEDURE — 80053 COMPREHEN METABOLIC PANEL: CPT

## 2019-05-21 PROCEDURE — 93880 EXTRACRANIAL BILAT STUDY: CPT

## 2019-05-21 PROCEDURE — 36415 COLL VENOUS BLD VENIPUNCTURE: CPT

## 2019-05-21 PROCEDURE — 99232 SBSQ HOSP IP/OBS MODERATE 35: CPT

## 2019-05-21 PROCEDURE — 85610 PROTHROMBIN TIME: CPT

## 2019-05-21 PROCEDURE — 81001 URINALYSIS AUTO W/SCOPE: CPT

## 2019-05-21 PROCEDURE — 83735 ASSAY OF MAGNESIUM: CPT

## 2019-05-21 PROCEDURE — 85027 COMPLETE CBC AUTOMATED: CPT

## 2019-05-21 PROCEDURE — 83690 ASSAY OF LIPASE: CPT

## 2019-05-21 PROCEDURE — 70450 CT HEAD/BRAIN W/O DYE: CPT

## 2019-05-21 PROCEDURE — 84100 ASSAY OF PHOSPHORUS: CPT

## 2019-05-21 PROCEDURE — 84443 ASSAY THYROID STIM HORMONE: CPT

## 2019-05-21 PROCEDURE — 84436 ASSAY OF TOTAL THYROXINE: CPT

## 2019-05-21 PROCEDURE — 84484 ASSAY OF TROPONIN QUANT: CPT

## 2019-05-21 PROCEDURE — 93005 ELECTROCARDIOGRAM TRACING: CPT

## 2019-05-21 PROCEDURE — 80048 BASIC METABOLIC PNL TOTAL CA: CPT

## 2019-05-21 PROCEDURE — 84439 ASSAY OF FREE THYROXINE: CPT

## 2019-05-21 PROCEDURE — 93306 TTE W/DOPPLER COMPLETE: CPT

## 2019-05-21 PROCEDURE — G0378: CPT

## 2019-05-21 PROCEDURE — 85730 THROMBOPLASTIN TIME PARTIAL: CPT

## 2019-05-21 PROCEDURE — 80307 DRUG TEST PRSMV CHEM ANLYZR: CPT

## 2019-05-21 PROCEDURE — 71045 X-RAY EXAM CHEST 1 VIEW: CPT

## 2019-05-21 PROCEDURE — 99285 EMERGENCY DEPT VISIT HI MDM: CPT | Mod: 25

## 2019-05-21 RX ORDER — THIAMINE MONONITRATE (VIT B1) 100 MG
1 TABLET ORAL
Qty: 0 | Refills: 0 | DISCHARGE
Start: 2019-05-21

## 2019-05-21 RX ORDER — NITROGLYCERIN 6.5 MG
0.4 CAPSULE, EXTENDED RELEASE ORAL
Qty: 0 | Refills: 0 | DISCHARGE

## 2019-05-21 RX ORDER — ISOSORBIDE MONONITRATE 60 MG/1
1 TABLET, EXTENDED RELEASE ORAL
Qty: 30 | Refills: 0
Start: 2019-05-21 | End: 2019-06-19

## 2019-05-21 RX ORDER — CARVEDILOL PHOSPHATE 80 MG/1
1 CAPSULE, EXTENDED RELEASE ORAL
Qty: 0 | Refills: 0 | DISCHARGE
Start: 2019-05-21

## 2019-05-21 RX ORDER — RAMIPRIL 5 MG
0 CAPSULE ORAL
Qty: 0 | Refills: 0 | DISCHARGE

## 2019-05-21 RX ORDER — ZOLPIDEM TARTRATE 10 MG/1
0 TABLET ORAL
Qty: 0 | Refills: 0 | DISCHARGE

## 2019-05-21 RX ORDER — FOLIC ACID 0.8 MG
1 TABLET ORAL
Qty: 0 | Refills: 0 | DISCHARGE
Start: 2019-05-21

## 2019-05-21 RX ADMIN — CLOPIDOGREL BISULFATE 75 MILLIGRAM(S): 75 TABLET, FILM COATED ORAL at 11:44

## 2019-05-21 RX ADMIN — Medication 75 MICROGRAM(S): at 06:21

## 2019-05-21 RX ADMIN — Medication 1 MILLIGRAM(S): at 11:44

## 2019-05-21 RX ADMIN — SIMVASTATIN 20 MILLIGRAM(S): 20 TABLET, FILM COATED ORAL at 21:31

## 2019-05-21 RX ADMIN — PANTOPRAZOLE SODIUM 40 MILLIGRAM(S): 20 TABLET, DELAYED RELEASE ORAL at 06:21

## 2019-05-21 RX ADMIN — DONEPEZIL HYDROCHLORIDE 10 MILLIGRAM(S): 10 TABLET, FILM COATED ORAL at 11:44

## 2019-05-21 RX ADMIN — LISINOPRIL 2.5 MILLIGRAM(S): 2.5 TABLET ORAL at 06:21

## 2019-05-21 RX ADMIN — CARVEDILOL PHOSPHATE 3.12 MILLIGRAM(S): 80 CAPSULE, EXTENDED RELEASE ORAL at 06:21

## 2019-05-21 RX ADMIN — ENOXAPARIN SODIUM 40 MILLIGRAM(S): 100 INJECTION SUBCUTANEOUS at 21:31

## 2019-05-21 RX ADMIN — ISOSORBIDE MONONITRATE 30 MILLIGRAM(S): 60 TABLET, EXTENDED RELEASE ORAL at 11:44

## 2019-05-21 RX ADMIN — Medication 100 MILLIGRAM(S): at 21:31

## 2019-05-21 RX ADMIN — Medication 1 TABLET(S): at 11:44

## 2019-05-21 NOTE — DISCHARGE NOTE PROVIDER - HOSPITAL COURSE
89YO Man with PMH of CAd s/p stent, PPM, dementia, hypothyroidism  who was brought to ED due to syncope and alcohol intoxication.   Patient came to ED on evening of 5/16. Patient is a poor historian. Per ED note Daughter came home from work and found him slumped over on chair, she could not get him to respond. EMS noted him to be hypotensive (initail BP 69). Apparently had diarrhea two days ago.    He was noted to be intoxicated with alcohol, serum alcohol was 150. He was placed on observation. He was seen by cardiology in ED and found to have an EF of 35-40% (previous EF of 60%).  Patient also had c diff approx 1 month ago and was treated.  He had diarrhea on Thursday as well, though he could not specify frequency or quantity.          A/P        >Syncope due to alcohol intoxication along with GI losses & low BP    counseled to stop drinking     ciwa, ativan prn     c/w folic acid and thiamine    TTE showed EF 35-40% - previous EF 60%.  Possibly secondary to RV pacing and from paradoxical septal motion. Plan is conservative management. No signs of overt CHF.     tele without arrhthymias    carotid doppler with b/l < 50% stenosis    Pt eval recs home    Cleared by cardio for d/c            >Hypotention-  now resolevd. decreased lisinopril to 2.5 mg daily but with rising Cr to 1.4. Discussed with cardio, kevin to stop lisinopril with outpatient follow up.  c/w coreg 3.125 bid, imdur 30 (with parameters)        >New systolic chf- not in exacerbation    c/w coreg, statin    Was started on lisinopril to 2.5 mg daily but with rising Cr to 1.4. Discussed with cardio, kevin to stop lisinopril with outpatient follow    defer further management as per cardiology         >Diarrhea - resolved        >Alcohol abuse - as above. CIWA protocol. SW consult. Alcohol rehab suggested as outpatient        >HTN - d/hubert lisinopril as above, SBP inn 110's. c/w coreg 3.125 bid, imdur 30 (with parameters)        >CAD- c/w home medication         >Hypothyroidism - c/w synthroid -  TSH 6.62 -?compliance    f/u repeat TFT in 4-6 weeks        >TRICIA on CKD stage 3-  hold lisinopril. f/u bmp as outpateint        >HLD - c/w statin         GENERAL: NAD    CHEST/LUNG: CTA b/l     HEART: S1S2+ audible    ABDOMEN: Soft, Nontender, Nondistended; Bowel sounds present    EXTREMITIES:  no edema    CNS: AAO X 3    Psychiatry: normal mood        VSS. Medically stable for d/c. BMP f/u as outpatewint with PMD & cardio

## 2019-05-21 NOTE — DISCHARGE NOTE PROVIDER - NSDCCPCAREPLAN_GEN_ALL_CORE_FT
PRINCIPAL DISCHARGE DIAGNOSIS  Diagnosis: Syncope  Assessment and Plan of Treatment: Pls stop using alcohol. STay hydrated. Follow up with your primary doctor within 1 week of discharge & cardiology      SECONDARY DISCHARGE DIAGNOSES  Diagnosis: Ejection fraction < 50%  Assessment and Plan of Treatment: New systolic chf- not in exacerbation  c/w coreg, statin  Was started on lisinopril to 2.5 mg daily but with rising Cr to 1.4. stop lisinopril with outpatient follow up with cvardiology & PND for BMP check

## 2019-05-21 NOTE — DISCHARGE NOTE PROVIDER - CARE PROVIDER_API CALL
Carlito Connors (MD; MPH)  Cardiology; Internal Medicine  [address] [place] [address]  Phone: [phone]  Fax: [phone]  Follow Up Time:

## 2019-05-21 NOTE — DISCHARGE NOTE NURSING/CASE MANAGEMENT/SOCIAL WORK - NSDCDPATPORTLINK_GEN_ALL_CORE
You can access the [facility] Patient Portal, offered by [facility], by registering with the following website: [email or web address]

## 2019-05-21 NOTE — PROGRESS NOTE ADULT - SUBJECTIVE AND OBJECTIVE BOX
[facility], [facility]                                   [address] [place] [address]                                                      PHONE: [phone]                                                         FAX: [phone]  [email or web address]  ---------------------------------------------------------------------------------------------------------------------------------    Overnight events/patient complaints: Patient seen at bedside. He says he is feeling much better and wants to go home.       No Known Allergies    MEDICATIONS  (STANDING):  carvedilol 3.125 milliGRAM(s) Oral every 12 hours  clopidogrel Tablet 75 milliGRAM(s) Oral daily  donepezil 10 milliGRAM(s) Oral daily  enoxaparin Injectable 40 milliGRAM(s) SubCutaneous every 24 hours  folic acid 1 milliGRAM(s) Oral daily  isosorbide   mononitrate ER Tablet (IMDUR) 30 milliGRAM(s) Oral daily  levothyroxine 75 MICROGram(s) Oral daily  multivitamin 1 Tablet(s) Oral daily  pantoprazole    Tablet 40 milliGRAM(s) Oral before breakfast  simvastatin 20 milliGRAM(s) Oral at bedtime  thiamine 100 milliGRAM(s) Oral daily    MEDICATIONS  (PRN):  LORazepam   Injectable 1 milliGRAM(s) IV Push every 1 hour PRN CIWA-Ar score 8 or greater      Vital Signs Last 24 Hrs  T(C): 36.3 (21 May 2019 10:46), Max: 36.6 (20 May 2019 15:58)  T(F): 97.4 (21 May 2019 10:46), Max: 97.8 (20 May 2019 15:58)  HR: 61 (21 May 2019 10:46) (61 - 78)  BP: 102/69 (21 May 2019 10:46) (102/69 - 117/78)  BP(mean): --  RR: 18 (21 May 2019 10:46) (18 - 18)  SpO2: 95% (21 May 2019 10:46) (95% - 98%)  ICU Vital Signs Last 24 Hrs  MARISA DIOR  I&O's Detail    20 May 2019 07:01  -  21 May 2019 07:00  --------------------------------------------------------  IN:    Oral Fluid: 480 mL  Total IN: 480 mL    OUT:    Stool: 1 mL    Voided: 600 mL  Total OUT: 601 mL    Total NET: -121 mL        Drug Dosing Weight  MARISA BARBY      PHYSICAL EXAM:  General: Appears well developed, well nourished alert and cooperative.  HEENT: Head; normocephalic, atraumatic.  Eyes: Pupils reactive, cornea wnl.  Neck: Supple, no nodes adenopathy, no NVD or carotid bruit or thyromegaly.  CARDIOVASCULAR: Normal S1 and S2, No murmur, rub, gallop or lift.   LUNGS: No rales, rhonchi or wheeze. Normal breath sounds bilaterally.  ABDOMEN: Soft, nontender without mass or organomegaly. bowel sounds normoactive.  EXTREMITIES: No clubbing, cyanosis or edema. Distal pulses wnl.   SKIN: warm and dry with normal turgor.  NEURO: Alert/oriented x 3/normal motor exam. No pathologic reflexes.    PSYCH: normal affect.        LABS:                        13.6   9.8   )-----------( 126      ( 21 May 2019 06:22 )             40.0     05-21    142  |  108<H>  |  35.0<H>  ----------------------------<  93  4.8   |  23.0  |  1.49<H>    Ca    9.0      21 May 2019 06:22  Phos  3.5     05-21  Mg     2.0     05-21      MARISA DIOR      RADIOLOGY & ADDITIONAL STUDIES:    INTERPRETATION OF TELEMETRY (personally reviewed): V pacing    ASSESSMENT AND PLAN:  89y Male with past medical history of CAD s/p stent placement, PPM ([employer]) TIA, presents complaining of hypotension, and unresponsiveness. Daughter came home from work this evening and found patient in his chair slumped over and unresponsive. EMS noted him to be hypotensive (initial: BP 69).     Syncope: Likely related to dehydration/alcohol intoxication (alcohol level 150)    CAD/CMP: New onset drop in EF. Possibly secondary to RV pacing and from paradoxical septal motion. Plan is conservative management. No signs of overt CHF.   - Continue low dose Coreg  - can hold Lisinopril for now in setting of elevate Cr    No further inpatient cardiac workup indicated at this time. I will arrange for close outpatient follow up.

## 2019-06-03 NOTE — ED PROVIDER NOTE - NS_EDPROVIDERDISPOUSERTYPE_ED_A_ED
Maria Luz 3, 2019     Patient: Sally Johnston   YOB: 1965   Date of Visit: 6/3/2019       To Whom it May Concern:    Sally Johnston was seen in my clinic on 6/3/2019 at 10:15 am.    Patient may go back to work on limited 6 hours daily with restrictions of avoiding long walks, standing, and no climbing until further evaluation in 6 months.    Sincerely,         Kenneth Castro MD    Medical information is confidential and cannot be disclosed without the written consent of the patient or her representative.       Scribe Attestation (For Scribes USE Only)... Attending Attestation (For Attendings USE Only).../Scribe Attestation (For Scribes USE Only)...

## 2020-03-10 NOTE — ED PROCEDURE NOTE - NS ED PROCEDURE ASSISTED BY
Chief Complaint   Patient presents with    Well Male     Spoke to patient Identified pt with two pt identifiers (Name @ )    1. Have you been to the ER, urgent care clinic since your last visit? Hospitalized since your last visit? No    2. Have you seen or consulted any other health care providers outside of the [address] [place] [address] since your last visit? Include any pap smears or colon screening. No     \"REVIEWED RECORD IN PREPARATION FOR VISIT AND HAVE OBTAINED NECESSARY DOCUMENTATION\"      Ricardo Mcghee is a 61 y.o. male who presents for routine immunizations. He denies any symptoms , reactions or allergies that would exclude them from being immunized today. Risks and adverse reactions were discussed and the VIS was given to them. All questions were addressed. He was observed for 5 min post injection. There were no reactions observed.     Maryanne Liang LPN The procedure was performed independently

## 2021-02-01 ENCOUNTER — EMERGENCY (EMERGENCY)
Facility: HOSPITAL | Age: 86
LOS: 1 days | Discharge: HOME | End: 2021-02-01
Attending: EMERGENCY MEDICINE
Payer: MEDICARE

## 2021-02-01 VITALS
HEART RATE: 70 BPM | RESPIRATION RATE: 18 BRPM | SYSTOLIC BLOOD PRESSURE: 132 MMHG | DIASTOLIC BLOOD PRESSURE: 64 MMHG | OXYGEN SATURATION: 96 %

## 2021-02-01 VITALS
HEIGHT: 72 IN | HEART RATE: 73 BPM | TEMPERATURE: 97 F | RESPIRATION RATE: 18 BRPM | DIASTOLIC BLOOD PRESSURE: 87 MMHG | SYSTOLIC BLOOD PRESSURE: 168 MMHG | OXYGEN SATURATION: 95 %

## 2021-02-01 DIAGNOSIS — Z98.89 OTHER SPECIFIED POSTPROCEDURAL STATES: Chronic | ICD-10-CM

## 2021-02-01 DIAGNOSIS — I25.9 CHRONIC ISCHEMIC HEART DISEASE, UNSPECIFIED: Chronic | ICD-10-CM

## 2021-02-01 DIAGNOSIS — Z87.2 PERSONAL HISTORY OF DISEASES OF THE SKIN AND SUBCUTANEOUS TISSUE: Chronic | ICD-10-CM

## 2021-02-01 DIAGNOSIS — Z41.9 ENCOUNTER FOR PROCEDURE FOR PURPOSES OTHER THAN REMEDYING HEALTH STATE, UNSPECIFIED: Chronic | ICD-10-CM

## 2021-02-01 LAB
ALBUMIN SERPL ELPH-MCNC: 3.8 G/DL — SIGNIFICANT CHANGE UP (ref 3.3–5.2)
ALP SERPL-CCNC: 110 U/L — SIGNIFICANT CHANGE UP (ref 40–120)
ALT FLD-CCNC: 12 U/L — SIGNIFICANT CHANGE UP
ANION GAP SERPL CALC-SCNC: 13 MMOL/L — SIGNIFICANT CHANGE UP (ref 5–17)
APTT BLD: 31.1 SEC — SIGNIFICANT CHANGE UP (ref 27.5–35.5)
AST SERPL-CCNC: 44 U/L — HIGH
BASOPHILS # BLD AUTO: 0.05 K/UL — SIGNIFICANT CHANGE UP (ref 0–0.2)
BASOPHILS NFR BLD AUTO: 0.4 % — SIGNIFICANT CHANGE UP (ref 0–2)
BILIRUB SERPL-MCNC: 1.3 MG/DL — SIGNIFICANT CHANGE UP (ref 0.4–2)
BUN SERPL-MCNC: 25 MG/DL — HIGH (ref 8–20)
CALCIUM SERPL-MCNC: 9 MG/DL — SIGNIFICANT CHANGE UP (ref 8.6–10.2)
CHLORIDE SERPL-SCNC: 104 MMOL/L — SIGNIFICANT CHANGE UP (ref 98–107)
CK MB CFR SERPL CALC: 11.7 NG/ML — HIGH (ref 0–6.7)
CK SERPL-CCNC: 540 U/L — HIGH (ref 30–200)
CO2 SERPL-SCNC: 23 MMOL/L — SIGNIFICANT CHANGE UP (ref 22–29)
CREAT SERPL-MCNC: 0.85 MG/DL — SIGNIFICANT CHANGE UP (ref 0.5–1.3)
EOSINOPHIL # BLD AUTO: 0.06 K/UL — SIGNIFICANT CHANGE UP (ref 0–0.5)
EOSINOPHIL NFR BLD AUTO: 0.4 % — SIGNIFICANT CHANGE UP (ref 0–6)
GLUCOSE SERPL-MCNC: 95 MG/DL — SIGNIFICANT CHANGE UP (ref 70–99)
HCT VFR BLD CALC: 49.5 % — SIGNIFICANT CHANGE UP (ref 39–50)
HGB BLD-MCNC: 16.5 G/DL — SIGNIFICANT CHANGE UP (ref 13–17)
IMM GRANULOCYTES NFR BLD AUTO: 0.4 % — SIGNIFICANT CHANGE UP (ref 0–1.5)
INR BLD: 1.19 RATIO — HIGH (ref 0.88–1.16)
LYMPHOCYTES # BLD AUTO: 1.08 K/UL — SIGNIFICANT CHANGE UP (ref 1–3.3)
LYMPHOCYTES # BLD AUTO: 7.7 % — LOW (ref 13–44)
MCHC RBC-ENTMCNC: 31.3 PG — SIGNIFICANT CHANGE UP (ref 27–34)
MCHC RBC-ENTMCNC: 33.3 GM/DL — SIGNIFICANT CHANGE UP (ref 32–36)
MCV RBC AUTO: 93.8 FL — SIGNIFICANT CHANGE UP (ref 80–100)
MONOCYTES # BLD AUTO: 0.91 K/UL — HIGH (ref 0–0.9)
MONOCYTES NFR BLD AUTO: 6.5 % — SIGNIFICANT CHANGE UP (ref 2–14)
NEUTROPHILS # BLD AUTO: 11.94 K/UL — HIGH (ref 1.8–7.4)
NEUTROPHILS NFR BLD AUTO: 84.6 % — HIGH (ref 43–77)
PLATELET # BLD AUTO: 122 K/UL — LOW (ref 150–400)
POTASSIUM SERPL-MCNC: 4.8 MMOL/L — SIGNIFICANT CHANGE UP (ref 3.5–5.3)
POTASSIUM SERPL-SCNC: 4.8 MMOL/L — SIGNIFICANT CHANGE UP (ref 3.5–5.3)
PROT SERPL-MCNC: 7.3 G/DL — SIGNIFICANT CHANGE UP (ref 6.6–8.7)
PROTHROM AB SERPL-ACNC: 13.7 SEC — HIGH (ref 10.6–13.6)
RBC # BLD: 5.28 M/UL — SIGNIFICANT CHANGE UP (ref 4.2–5.8)
RBC # FLD: 12.9 % — SIGNIFICANT CHANGE UP (ref 10.3–14.5)
SODIUM SERPL-SCNC: 140 MMOL/L — SIGNIFICANT CHANGE UP (ref 135–145)
TROPONIN T SERPL-MCNC: <0.01 NG/ML — SIGNIFICANT CHANGE UP (ref 0–0.06)
WBC # BLD: 14.09 K/UL — HIGH (ref 3.8–10.5)
WBC # FLD AUTO: 14.09 K/UL — HIGH (ref 3.8–10.5)

## 2021-02-01 PROCEDURE — 85025 COMPLETE CBC W/AUTO DIFF WBC: CPT

## 2021-02-01 PROCEDURE — 70450 CT HEAD/BRAIN W/O DYE: CPT | Mod: 26

## 2021-02-01 PROCEDURE — 80053 COMPREHEN METABOLIC PANEL: CPT

## 2021-02-01 PROCEDURE — 36415 COLL VENOUS BLD VENIPUNCTURE: CPT

## 2021-02-01 PROCEDURE — 73552 X-RAY EXAM OF FEMUR 2/>: CPT

## 2021-02-01 PROCEDURE — 99285 EMERGENCY DEPT VISIT HI MDM: CPT

## 2021-02-01 PROCEDURE — 73522 X-RAY EXAM HIPS BI 3-4 VIEWS: CPT

## 2021-02-01 PROCEDURE — 82553 CREATINE MB FRACTION: CPT

## 2021-02-01 PROCEDURE — 72125 CT NECK SPINE W/O DYE: CPT

## 2021-02-01 PROCEDURE — 73522 X-RAY EXAM HIPS BI 3-4 VIEWS: CPT | Mod: 26

## 2021-02-01 PROCEDURE — 73552 X-RAY EXAM OF FEMUR 2/>: CPT | Mod: 26,LT

## 2021-02-01 PROCEDURE — 72125 CT NECK SPINE W/O DYE: CPT | Mod: 26

## 2021-02-01 PROCEDURE — 71045 X-RAY EXAM CHEST 1 VIEW: CPT

## 2021-02-01 PROCEDURE — 71045 X-RAY EXAM CHEST 1 VIEW: CPT | Mod: 26

## 2021-02-01 PROCEDURE — 85730 THROMBOPLASTIN TIME PARTIAL: CPT

## 2021-02-01 PROCEDURE — 85610 PROTHROMBIN TIME: CPT

## 2021-02-01 PROCEDURE — 84484 ASSAY OF TROPONIN QUANT: CPT

## 2021-02-01 PROCEDURE — 82550 ASSAY OF CK (CPK): CPT

## 2021-02-01 PROCEDURE — 70450 CT HEAD/BRAIN W/O DYE: CPT

## 2021-02-01 PROCEDURE — 99284 EMERGENCY DEPT VISIT MOD MDM: CPT | Mod: 25

## 2021-02-01 RX ORDER — ACETAMINOPHEN 500 MG
975 TABLET ORAL ONCE
Refills: 0 | Status: COMPLETED | OUTPATIENT
Start: 2021-02-01 | End: 2021-02-01

## 2021-02-01 RX ADMIN — Medication 975 MILLIGRAM(S): at 19:40

## 2021-02-01 NOTE — ED PROVIDER NOTE - PROGRESS NOTE DETAILS
[address] [facility]  [phone]  willis  spoke with gale goncalves would prefer pt at ho he is bed bound at baseline no acute traumatic injuries found daughter to receive tp back at home via ambulance as I discussed withher return  precautions given to daughter who is pts primary caretaker at home via telephone. pt has no overt complaints per daughter he was awitnesseed fall where he slowly slithered his way out of his house hospital bed she prefers him to be back home and I am in agreement

## 2021-02-01 NOTE — ED PROVIDER NOTE - OBJECTIVE STATEMENT
89 y/o M with PMH EtOH abuse, CAD, dementia, MI, TIA, not on blood thinners presents after a fall occurred at home, unwitnessed, found on the floor by his aide, tile floor, complaining of bilateral hip pain. Patient's baseline is oriented x 1 and not oriented to situation, therefore patient cannot provide history and cannot tell me what happened. Unclear how long patient was on the floor or if he lost consciousness.

## 2021-02-01 NOTE — ED ADULT TRIAGE NOTE - CHIEF COMPLAINT QUOTE
pt BIBA from home, disoriented, at baseline mental status as per ems. as per ems, pt fell out of bed onto tile floor and was found 30min later. unknown hit head. unknown loc. denies blood thinners. no obvious injury or deformity. pt able to follow simple commands. c/o L hip pain. dr. salas called to bedside for eval. priority ct called.

## 2021-02-01 NOTE — ED PROVIDER NOTE - CLINICAL SUMMARY MEDICAL DECISION MAKING FREE TEXT BOX
89 y/o M presents s/p fall at home, dementia at baseline, unknown LOC, not on blood thinners, c/o b/l hip pain. Priority CT called, no obvious deformity of the LE. Will evaluate for cardiac or infectious etiology of fall, any traumatic injuries and reassess. 89 y/o M presents s/p fall at home, dementia at baseline, unknown LOC, not on blood thinners, c/o b/l hip pain. Priority CT called, no obvious deformity of the LE. Will evaluate for cardiac or infectious etiology of fall, any traumatic injuries and reassess.    see progress note

## 2021-02-01 NOTE — ED ADULT NURSE NOTE - NSIMPLEMENTINTERV_GEN_ALL_ED
Implemented All Fall with Harm Risk Interventions:  [place] to call system. Call bell, personal items and telephone within reach. Instruct patient to call for assistance. Room bathroom lighting operational. Non-slip footwear when patient is off stretcher. Physically safe environment: no spills, clutter or unnecessary equipment. Stretcher in lowest position, wheels locked, appropriate side rails in place. Provide visual cue, wrist band, yellow gown, etc. Monitor gait and stability. Monitor for mental status changes and reorient to person, place, and time. Review medications for side effects contributing to fall risk. Reinforce activity limits and safety measures with patient and family. Provide visual clues: red socks.

## 2021-02-01 NOTE — ED PROVIDER NOTE - PATIENT PORTAL LINK FT
You can access the FollowMyHealth Patient Portal offered by [facility] by registering at the following website: [email or web address]. By joining [employer]’s FollowMyHealth portal, you will also be able to view your health information using other applications (apps) compatible with our system.

## 2021-02-01 NOTE — ED PROVIDER NOTE - PHYSICAL EXAMINATION
Const: Awake, alert and oriented x 1. In no acute distress. Well appearing.  HEENT: NC/AT. Moist mucous membranes.  Eyes: No scleral icterus. EOMI.  Neck:. Soft and supple. Full ROM without pain.  Cardiac: Regular rate and regular rhythm. +S1/S2. No murmurs. Peripheral pulses 2+ and symmetric. No LE edema.  Resp: Speaking in full sentences. No evidence of respiratory distress. No wheezes, rales or rhonchi.  Abd: Soft, non-tender, non-distended. Normal bowel sounds in all 4 quadrants. No guarding or rebound.  Back: Spine midline and non-tender. No CVAT.  MSK: Chest wall stable. Pelvis stable.   Skin: No rashes, abrasions or lacerations.  Neuro: Awake, alert & oriented x 3. Moves all extremities symmetrically. Const: Awake, alert and oriented x 1. In no acute distress. Well appearing.  HEENT: NC/AT. Moist mucous membranes.  Eyes: No scleral icterus. EOMI.  Neck:. Soft and supple. Full ROM without pain.  Cardiac: Regular rate and regular rhythm. +S1/S2. No murmurs. Peripheral pulses 2+ and symmetric. No LE edema.  Resp: Speaking in full sentences. No evidence of respiratory distress. No wheezes, rales or rhonchi.  Abd: Soft, non-tender, non-distended. Normal bowel sounds in all 4 quadrants. No guarding or rebound.  Back: Spine midline and non-tender. No CVAT.  MSK: Chest wall stable. Pelvis stable. Left greater trochanter TTP with decreased left LE ROM. Full ROM right LE.  Skin: No rashes, abrasions or lacerations.  Neuro: Awake, alert & oriented x 3. CN II-XII symmetrically intact. No pronator drift. No LE drift. Normal sensation bilateral upper and lower extremities. Clear speech.

## 2021-02-01 NOTE — ED PROVIDER NOTE - PMH
Alcohol abuse    Anginal pain    BPH (benign prostatic hypertrophy)    Cataract    Coronary artery disease    Dementia    Enlarged prostate    Heart attack    Hypothyroidism    Reflux    Stented coronary artery  x1  TIA (transient ischemic attack)

## 2021-02-02 PROBLEM — F03.90 UNSPECIFIED DEMENTIA WITHOUT BEHAVIORAL DISTURBANCE: Chronic | Status: ACTIVE | Noted: 2019-05-17

## 2021-02-02 PROBLEM — F10.10 ALCOHOL ABUSE, UNCOMPLICATED: Chronic | Status: ACTIVE | Noted: 2019-05-18

## 2021-04-02 ENCOUNTER — APPOINTMENT (OUTPATIENT)
Dept: DISASTER EMERGENCY | Facility: OTHER | Age: 86
End: 2021-04-02

## 2021-04-21 ENCOUNTER — INPATIENT (INPATIENT)
Facility: HOSPITAL | Age: 86
LOS: 4 days | Discharge: NURSING, REHAB OR LONG-TERM CARE | DRG: 923 | End: 2021-04-26
Attending: HOSPITALIST | Admitting: HOSPITALIST
Payer: MEDICARE

## 2021-04-21 VITALS
TEMPERATURE: 94 F | SYSTOLIC BLOOD PRESSURE: 133 MMHG | OXYGEN SATURATION: 97 % | RESPIRATION RATE: 16 BRPM | DIASTOLIC BLOOD PRESSURE: 81 MMHG | HEART RATE: 80 BPM | HEIGHT: 72 IN

## 2021-04-21 DIAGNOSIS — Z98.89 OTHER SPECIFIED POSTPROCEDURAL STATES: Chronic | ICD-10-CM

## 2021-04-21 DIAGNOSIS — Z87.2 PERSONAL HISTORY OF DISEASES OF THE SKIN AND SUBCUTANEOUS TISSUE: Chronic | ICD-10-CM

## 2021-04-21 DIAGNOSIS — T68.XXXA HYPOTHERMIA, INITIAL ENCOUNTER: ICD-10-CM

## 2021-04-21 DIAGNOSIS — Z41.9 ENCOUNTER FOR PROCEDURE FOR PURPOSES OTHER THAN REMEDYING HEALTH STATE, UNSPECIFIED: Chronic | ICD-10-CM

## 2021-04-21 DIAGNOSIS — I25.9 CHRONIC ISCHEMIC HEART DISEASE, UNSPECIFIED: Chronic | ICD-10-CM

## 2021-04-21 LAB
ALBUMIN SERPL ELPH-MCNC: 3.4 G/DL — SIGNIFICANT CHANGE UP (ref 3.3–5.2)
ALP SERPL-CCNC: 100 U/L — SIGNIFICANT CHANGE UP (ref 40–120)
ALT FLD-CCNC: 7 U/L — SIGNIFICANT CHANGE UP
ANION GAP SERPL CALC-SCNC: 11 MMOL/L — SIGNIFICANT CHANGE UP (ref 5–17)
APPEARANCE UR: CLEAR — SIGNIFICANT CHANGE UP
APTT BLD: 32 SEC — SIGNIFICANT CHANGE UP (ref 27.5–35.5)
AST SERPL-CCNC: 36 U/L — SIGNIFICANT CHANGE UP
BASOPHILS # BLD AUTO: 0.07 K/UL — SIGNIFICANT CHANGE UP (ref 0–0.2)
BASOPHILS NFR BLD AUTO: 0.6 % — SIGNIFICANT CHANGE UP (ref 0–2)
BILIRUB SERPL-MCNC: 0.8 MG/DL — SIGNIFICANT CHANGE UP (ref 0.4–2)
BILIRUB UR-MCNC: NEGATIVE — SIGNIFICANT CHANGE UP
BUN SERPL-MCNC: 24 MG/DL — HIGH (ref 8–20)
CALCIUM SERPL-MCNC: 8.7 MG/DL — SIGNIFICANT CHANGE UP (ref 8.6–10.2)
CHLORIDE SERPL-SCNC: 102 MMOL/L — SIGNIFICANT CHANGE UP (ref 98–107)
CO2 SERPL-SCNC: 25 MMOL/L — SIGNIFICANT CHANGE UP (ref 22–29)
COLOR SPEC: YELLOW — SIGNIFICANT CHANGE UP
CREAT SERPL-MCNC: 0.95 MG/DL — SIGNIFICANT CHANGE UP (ref 0.5–1.3)
DIFF PNL FLD: ABNORMAL
EOSINOPHIL # BLD AUTO: 0.32 K/UL — SIGNIFICANT CHANGE UP (ref 0–0.5)
EOSINOPHIL NFR BLD AUTO: 2.8 % — SIGNIFICANT CHANGE UP (ref 0–6)
GLUCOSE SERPL-MCNC: 91 MG/DL — SIGNIFICANT CHANGE UP (ref 70–99)
GLUCOSE UR QL: NEGATIVE MG/DL — SIGNIFICANT CHANGE UP
HCT VFR BLD CALC: 45.2 % — SIGNIFICANT CHANGE UP (ref 39–50)
HGB BLD-MCNC: 15 G/DL — SIGNIFICANT CHANGE UP (ref 13–17)
IMM GRANULOCYTES NFR BLD AUTO: 0.4 % — SIGNIFICANT CHANGE UP (ref 0–1.5)
INR BLD: 1.19 RATIO — HIGH (ref 0.88–1.16)
KETONES UR-MCNC: ABNORMAL
LACTATE BLDV-MCNC: 1.1 MMOL/L — SIGNIFICANT CHANGE UP (ref 0.5–2)
LEUKOCYTE ESTERASE UR-ACNC: NEGATIVE — SIGNIFICANT CHANGE UP
LYMPHOCYTES # BLD AUTO: 1.33 K/UL — SIGNIFICANT CHANGE UP (ref 1–3.3)
LYMPHOCYTES # BLD AUTO: 11.4 % — LOW (ref 13–44)
MCHC RBC-ENTMCNC: 30.9 PG — SIGNIFICANT CHANGE UP (ref 27–34)
MCHC RBC-ENTMCNC: 33.2 GM/DL — SIGNIFICANT CHANGE UP (ref 32–36)
MCV RBC AUTO: 93 FL — SIGNIFICANT CHANGE UP (ref 80–100)
MONOCYTES # BLD AUTO: 0.94 K/UL — HIGH (ref 0–0.9)
MONOCYTES NFR BLD AUTO: 8.1 % — SIGNIFICANT CHANGE UP (ref 2–14)
NEUTROPHILS # BLD AUTO: 8.92 K/UL — HIGH (ref 1.8–7.4)
NEUTROPHILS NFR BLD AUTO: 76.7 % — SIGNIFICANT CHANGE UP (ref 43–77)
NITRITE UR-MCNC: NEGATIVE — SIGNIFICANT CHANGE UP
PH UR: 5 — SIGNIFICANT CHANGE UP (ref 5–8)
PLATELET # BLD AUTO: 144 K/UL — LOW (ref 150–400)
POTASSIUM SERPL-MCNC: 4.1 MMOL/L — SIGNIFICANT CHANGE UP (ref 3.5–5.3)
POTASSIUM SERPL-SCNC: 4.1 MMOL/L — SIGNIFICANT CHANGE UP (ref 3.5–5.3)
PROT SERPL-MCNC: 6.6 G/DL — SIGNIFICANT CHANGE UP (ref 6.6–8.7)
PROT UR-MCNC: NEGATIVE MG/DL — SIGNIFICANT CHANGE UP
PROTHROM AB SERPL-ACNC: 13.7 SEC — HIGH (ref 10.6–13.6)
RBC # BLD: 4.86 M/UL — SIGNIFICANT CHANGE UP (ref 4.2–5.8)
RBC # FLD: 12.7 % — SIGNIFICANT CHANGE UP (ref 10.3–14.5)
RBC CASTS # UR COMP ASSIST: ABNORMAL /HPF (ref 0–4)
SARS-COV-2 RNA SPEC QL NAA+PROBE: SIGNIFICANT CHANGE UP
SODIUM SERPL-SCNC: 138 MMOL/L — SIGNIFICANT CHANGE UP (ref 135–145)
SP GR SPEC: 1.01 — SIGNIFICANT CHANGE UP (ref 1.01–1.02)
T3 SERPL-MCNC: 92 NG/DL — SIGNIFICANT CHANGE UP (ref 80–200)
T4 AB SER-ACNC: 5.2 UG/DL — SIGNIFICANT CHANGE UP (ref 4.5–12)
TSH SERPL-MCNC: 9.87 UIU/ML — HIGH (ref 0.27–4.2)
UROBILINOGEN FLD QL: NEGATIVE MG/DL — SIGNIFICANT CHANGE UP
WBC # BLD: 11.63 K/UL — HIGH (ref 3.8–10.5)
WBC # FLD AUTO: 11.63 K/UL — HIGH (ref 3.8–10.5)

## 2021-04-21 PROCEDURE — 93880 EXTRACRANIAL BILAT STUDY: CPT | Mod: 26

## 2021-04-21 PROCEDURE — 70450 CT HEAD/BRAIN W/O DYE: CPT | Mod: 26

## 2021-04-21 PROCEDURE — 72170 X-RAY EXAM OF PELVIS: CPT | Mod: 26

## 2021-04-21 PROCEDURE — 99285 EMERGENCY DEPT VISIT HI MDM: CPT

## 2021-04-21 PROCEDURE — 99223 1ST HOSP IP/OBS HIGH 75: CPT

## 2021-04-21 PROCEDURE — 72125 CT NECK SPINE W/O DYE: CPT | Mod: 26

## 2021-04-21 PROCEDURE — 71045 X-RAY EXAM CHEST 1 VIEW: CPT | Mod: 26

## 2021-04-21 RX ORDER — OMEPRAZOLE 10 MG/1
1 CAPSULE, DELAYED RELEASE ORAL
Qty: 0 | Refills: 0 | DISCHARGE

## 2021-04-21 RX ORDER — SODIUM CHLORIDE 9 MG/ML
2000 INJECTION, SOLUTION INTRAVENOUS ONCE
Refills: 0 | Status: COMPLETED | OUTPATIENT
Start: 2021-04-21 | End: 2021-04-21

## 2021-04-21 RX ORDER — CARVEDILOL PHOSPHATE 80 MG/1
3.12 CAPSULE, EXTENDED RELEASE ORAL EVERY 12 HOURS
Refills: 0 | Status: DISCONTINUED | OUTPATIENT
Start: 2021-04-21 | End: 2021-04-26

## 2021-04-21 RX ORDER — TERAZOSIN HYDROCHLORIDE 10 MG/1
1 CAPSULE ORAL
Qty: 0 | Refills: 0 | DISCHARGE

## 2021-04-21 RX ORDER — SENNA PLUS 8.6 MG/1
2 TABLET ORAL AT BEDTIME
Refills: 0 | Status: DISCONTINUED | OUTPATIENT
Start: 2021-04-21 | End: 2021-04-26

## 2021-04-21 RX ORDER — QUETIAPINE FUMARATE 200 MG/1
1 TABLET, FILM COATED ORAL
Qty: 0 | Refills: 0 | DISCHARGE

## 2021-04-21 RX ORDER — QUETIAPINE FUMARATE 200 MG/1
50 TABLET, FILM COATED ORAL AT BEDTIME
Refills: 0 | Status: DISCONTINUED | OUTPATIENT
Start: 2021-04-21 | End: 2021-04-26

## 2021-04-21 RX ORDER — AZITHROMYCIN 500 MG/1
500 TABLET, FILM COATED ORAL ONCE
Refills: 0 | Status: COMPLETED | OUTPATIENT
Start: 2021-04-21 | End: 2021-04-21

## 2021-04-21 RX ORDER — TAMSULOSIN HYDROCHLORIDE 0.4 MG/1
0.8 CAPSULE ORAL AT BEDTIME
Refills: 0 | Status: DISCONTINUED | OUTPATIENT
Start: 2021-04-21 | End: 2021-04-26

## 2021-04-21 RX ORDER — SIMVASTATIN 20 MG/1
20 TABLET, FILM COATED ORAL AT BEDTIME
Refills: 0 | Status: DISCONTINUED | OUTPATIENT
Start: 2021-04-21 | End: 2021-04-26

## 2021-04-21 RX ORDER — ACETAMINOPHEN 500 MG
650 TABLET ORAL EVERY 4 HOURS
Refills: 0 | Status: DISCONTINUED | OUTPATIENT
Start: 2021-04-21 | End: 2021-04-26

## 2021-04-21 RX ORDER — DONEPEZIL HYDROCHLORIDE 10 MG/1
10 TABLET, FILM COATED ORAL AT BEDTIME
Refills: 0 | Status: DISCONTINUED | OUTPATIENT
Start: 2021-04-21 | End: 2021-04-26

## 2021-04-21 RX ORDER — CALCIUM CARBONATE 500(1250)
3 TABLET ORAL EVERY 6 HOURS
Refills: 0 | Status: DISCONTINUED | OUTPATIENT
Start: 2021-04-21 | End: 2021-04-26

## 2021-04-21 RX ORDER — CEFTRIAXONE 500 MG/1
1000 INJECTION, POWDER, FOR SOLUTION INTRAMUSCULAR; INTRAVENOUS ONCE
Refills: 0 | Status: COMPLETED | OUTPATIENT
Start: 2021-04-21 | End: 2021-04-21

## 2021-04-21 RX ADMIN — SIMVASTATIN 20 MILLIGRAM(S): 20 TABLET, FILM COATED ORAL at 22:38

## 2021-04-21 RX ADMIN — CEFTRIAXONE 1000 MILLIGRAM(S): 500 INJECTION, POWDER, FOR SOLUTION INTRAMUSCULAR; INTRAVENOUS at 12:19

## 2021-04-21 RX ADMIN — TAMSULOSIN HYDROCHLORIDE 0.8 MILLIGRAM(S): 0.4 CAPSULE ORAL at 22:38

## 2021-04-21 RX ADMIN — SODIUM CHLORIDE 2000 MILLILITER(S): 9 INJECTION, SOLUTION INTRAVENOUS at 11:52

## 2021-04-21 RX ADMIN — SODIUM CHLORIDE 2000 MILLILITER(S): 9 INJECTION, SOLUTION INTRAVENOUS at 14:00

## 2021-04-21 RX ADMIN — CARVEDILOL PHOSPHATE 3.12 MILLIGRAM(S): 80 CAPSULE, EXTENDED RELEASE ORAL at 19:13

## 2021-04-21 RX ADMIN — AZITHROMYCIN 500 MILLIGRAM(S): 500 TABLET, FILM COATED ORAL at 13:30

## 2021-04-21 RX ADMIN — AZITHROMYCIN 255 MILLIGRAM(S): 500 TABLET, FILM COATED ORAL at 12:21

## 2021-04-21 RX ADMIN — DONEPEZIL HYDROCHLORIDE 10 MILLIGRAM(S): 10 TABLET, FILM COATED ORAL at 22:38

## 2021-04-21 RX ADMIN — CEFTRIAXONE 100 MILLIGRAM(S): 500 INJECTION, POWDER, FOR SOLUTION INTRAMUSCULAR; INTRAVENOUS at 11:52

## 2021-04-21 NOTE — ED PROVIDER NOTE - PHYSICAL EXAMINATION
Alert, and in no apparent distress. Pt is normocephalic, atraumatic.  Pupils are equal, round, lips pink, moist mucous membranes, tongue midline. Neck supple.   Lungs clear to auscultation. Heart regular rate and rhythm, normal S1, S2.  Abdomen is soft, nontender, no pulsatile mass, no masses, no distension,    Non-focal sensory, 5 out of 5 motor strength,  Skin without rash, does not appear agitated

## 2021-04-21 NOTE — H&P ADULT - HISTORY OF PRESENT ILLNESS
89 YO Man with PMH of CAD s/p stent, PPM, dementia, hypothyroidism who was found down in the bathroom by his daughter. Patient is a poor historian and only oriented to self. I attempted to call Daughter Clarissa but no answer therefore history had to be obtained from chart and ED provider. Per ED provider  patient is s/p unwitnessed fall this morning; as per ems, daughter heard patient fall in the bathroom and found pt on his back; pt complaining of pain in upper thighs; denies any headache, chest pain or back pain.   In the ED patient underwent CT head and neck which showed no fracture or bleed. He was noted to be hypothermic. He was given azithromycin and ceftriaxone. Blood cultures and urine samples were sent.  At the time of my eval patient is alert to self only. He states he feels fine and offers no complaints.

## 2021-04-21 NOTE — CONSULT NOTE ADULT - ASSESSMENT
Assessment  Fall vs syncope  s/p PPM with normal function  rare PAF not AC candidate  progressive dementia  stable CAD s/p remote MI remote PCI w/o angina  ischemic CM compensated w/o evidence of CHF Assessment  Fall vs syncope ? re aricept  s/p PPM with normal function/VVI  rare PAF not AC candidate  progressive dementia  stable CAD s/p remote MI remote PCI w/o angina  ischemic CM compensated w/o evidence of CHF    Rec  brief telemetry  cont outpt meds'  will interrogate PPM in am

## 2021-04-21 NOTE — CONSULT NOTE ADULT - SUBJECTIVE AND OBJECTIVE BOX
Formerly [facility], THE [facility]                                   [address] [place] [address]                                                      PHONE: [phone]                                                         FAX: [phone]  [email or web address]  ---------------------------------------------------------------------------------------------------------------------------------    Reason for Consult: fall vs syncope    HPI:  MARISA DIOR is an 89y Male with CAD s/p remote PCI  remote MI, mod LV dysfunction EF 40%,mod MR mild AI, baseline RBBB rare PAF not AC candidate, s/p DDD PPM complicated by pericardial effusion/window , progressive dementia admitted after pt found on floor, unwitnessed fall vs syncope.    PAST MEDICAL & SURGICAL HISTORY:  Reflux    Anginal pain    Coronary artery disease    Enlarged prostate    Heart attack    TIA (transient ischemic attack)    Cataract    Stented coronary artery  x1    BPH (benign prostatic hypertrophy)    Hypothyroidism    Dementia    Alcohol abuse    S/P appendectomy    H/O: hemorrhoidectomy    H/O pilonidal cyst    Coronary artery disease  cardiac stents     H/O cataract removal with insertion of prosthetic lens    Elective surgery  pt had bladder mass removed apporoximayly 6 weeks ago by Dr. Mccord        No Known Allergies      MEDICATIONS  (STANDING):  carvedilol 3.125 milliGRAM(s) Oral every 12 hours  donepezil 10 milliGRAM(s) Oral at bedtime  simvastatin 20 milliGRAM(s) Oral at bedtime  tamsulosin 0.8 milliGRAM(s) Oral at bedtime    MEDICATIONS  (PRN):  acetaminophen   Tablet .. 650 milliGRAM(s) Oral every 4 hours PRN Mild Pain (1 - 3)  calcium carbonate    500 mG (Tums) Chewable 3 Tablet(s) Chew every 6 hours PRN Dyspepsia  senna 2 Tablet(s) Oral at bedtime PRN Constipation      Social History:  Cigarettes:         neg           Alchohol:    neg             Illicit Drug Abuse:  neg  neg FH CAD    ROS: Negative other than as mentioned in HPI.    Vital Signs Last 24 Hrs  T(C): 36.4 (2021 16:39), Max: 36.8 (2021 15:16)  T(F): 97.5 (2021 16:39), Max: 98.2 (2021 15:16)  HR: 87 (2021 16:39) (73 - 87)  BP: 127/87 (2021 16:39) (123/80 - 133/81)  BP(mean): --  RR: 20 (2021 16:39) (16 - 20)  SpO2: 98% (2021 16:39) (97% - 98%)  ICU Vital Signs Last 24 Hrs  MARISA DIOR  I&O's Detail    I&O's Summary    Drug Dosing Weight  MARISA DIOR      PHYSICAL EXAM:  General: Appears well developed, well nourished alert and cooperative.  HEENT: Head; normocephalic, atraumatic.  Eyes: Pupils reactive, cornea wnl.  Neck: Supple, no nodes adenopathy, no NVD or carotid bruit or thyromegaly.  CARDIOVASCULAR: Normal S1 and S2, No murmur, rub, gallop or lift.   LUNGS: No rales, rhonchi or wheeze. Normal breath sounds bilaterally.  ABDOMEN: Soft, nontender without mass or organomegaly. bowel sounds normoactive.  EXTREMITIES: No clubbing, cyanosis or edema. Distal pulses wnl.   SKIN: warm and dry with normal turgor.  NEURO: Alert/oriented x 3/normal motor exam. No pathologic reflexes.    PSYCH: normal affect.        LABS:                        15.0   11.63 )-----------( 144      ( 2021 11:48 )             45.2     04-21    138  |  102  |  24.0<H>  ----------------------------<  91  4.1   |  25.0  |  0.95    Ca    8.7      2021 11:48    TPro  6.6  /  Alb  3.4  /  TBili  0.8  /  DBili  x   /  AST  36  /  ALT  7   /  AlkPhos  100      MARISA DIOR      PT/INR - ( 2021 11:48 )   PT: 13.7 sec;   INR: 1.19 ratio         PTT - ( 2021 11:48 )  PTT:32.0 sec  Urinalysis Basic - ( 2021 15:26 )    Color: Yellow / Appearance: Clear / S.015 / pH: x  Gluc: x / Ketone: Trace  / Bili: Negative / Urobili: Negative mg/dL   Blood: x / Protein: Negative mg/dL / Nitrite: Negative   Leuk Esterase: Negative / RBC: 3-5 /HPF / WBC x   Sq Epi: x / Non Sq Epi: x / Bacteria: x        RADIOLOGY & ADDITIONAL STUDIES:< from: Xray Chest 1 View-PORTABLE IMMEDIATE (Xray Chest 1 View-PORTABLE IMMEDIATE .) (21 @ 10:45) >  FINDINGS:    Single frontal view of the chest demonstrates the lungs to be clear. The cardiomediastinal silhouette is enlarged. No acute osseous abnormalities. Left-sided dual-chamber pacemaker. Osteopenia. Consider CT as clinically warranted.    IMPRESSION: No acute cardiopulmonary disease process. Cardiomegaly.            KALPESH PERDUE MD; Attending Radiologist  This document has been electronically signed. 2021 11:58AM    < end of copied text >      INTERPRETATION OF TELEMETRY (personally reviewed):    ECG:< from: 12 Lead ECG (21 @ 11:58) >  Diagnosis Line Ventricular-paced rhythm  Abnormal ECG    Confirmed by DEISI HAQ ([id number]) on 2021 3:10:30 PM    < end of copied text >      ECHO:< from: TTE Echo Complete w/Doppler (19 @ 14:52) >     PHYSICIAN INTERPRETATION:  Left Ventricle: Endocardial visualization was enhanced with intravenous   echo contrast. The left ventricular internal cavity size is normal.  Global LV systolic function was moderately decreased. Left ventricular   ejection fraction, by visual estimation, is 35 to 40%. Abnormal   (paradoxical) septal motion, consistent with left bundle branch block.   The left ventricular diastolic function could not be assessed in this   study.       LV Wall Scoring:  The apical septal segment, apical inferior segment, and apex are   hypokinetic.    Right Ventricle: The right ventricular size is normal. RV systolic   function is mildly reduced.  Left Atrium: Severely enlarged left atrium.  Right Atrium: Severely enlarged right atrium.  Pericardium: There is no evidence of pericardial effusion.  Mitral Valve: Structurally normal mitral valve, with normal leaflet   excursion. Mild thickening and calcification of the anterior and   posterior mitral valve leaflets. There is mild to moderate mitral annular   calcification. Moderate mitral valve regurgitation is seen. The MR jet is   centrally-directed.  Tricuspid Valve: The tricuspid valve is normal in structure.   Moderate-severe tricuspid regurgitation is visualized.  Aortic Valve: The aortic valve is trileaflet. Mild aortic valve   regurgitation is seen. Calcified aortic valve with noevidence of   significant stenosis.  Pulmonic Valve: Mild pulmonic valve regurgitation.  Aorta: The aortic root is normal in size and structure.  Venous: The inferior vena cava was normal sized, with respiratory size   variation greater than 50%.       Summary:   1. Left ventricular ejection fraction, by visual estimation, is 35 to   40%.   2. Moderately decreased global left ventricular systolic function.   3. Apical septal segment, apical inferior segment, and apex are abnormal   as described above.   4. Abnormal septal motion consistent with left bundle branch block.   5. The left ventricular diastolic function could not be assessed in this   study.   6. Normal left ventricular internal cavity size.   7. Severely enlarged left atrium.   8.Severely enlarged right atrium.   9. Mild to moderate mitral annular calcification.  10. Mild thickening and calcification of the anterior and posterior   mitral valve leaflets.  11. Moderate mitral valve regurgitation.  12. Calcified aortic valve with no evidence of significant stenosis.  13. Mild aortic regurgitation.  14. Moderate-severe tricuspid regurgitation.  15. Endocardial visualization was enhanced with intravenous echo contrast.  16. There is no evidence of pericardial effusion.    MD Joni Electronically signed on 2019 at 5:13:20 PM         < end of copied text >      STRESS TEST:                       [place] [facility], [facility]                                   [address] [place] [address]                                                      PHONE: [phone]                                                         FAX: [phone]  [email or web address]  ---------------------------------------------------------------------------------------------------------------------------------    Reason for Consult: fall vs syncope    HPI:  MARISA DIOR is an 89y Male with CAD s/p remote PCI  remote MI, mod LV dysfunction EF 40%,mod MR mild AI, baseline RBBB rare PAF not AC candidate, s/p DDD PPM complicated by pericardial effusion/window , progressive dementia admitted after pt found on floor, unwitnessed fall vs syncope. No history obtainable from pt due to profound demntia    PAST MEDICAL & SURGICAL HISTORY:  Reflux    Anginal pain    Coronary artery disease    Enlarged prostate    Heart attack    TIA (transient ischemic attack)    Cataract    Stented coronary artery  x1    BPH (benign prostatic hypertrophy)    Hypothyroidism    Dementia    Alcohol abuse    S/P appendectomy    H/O: hemorrhoidectomy    H/O pilonidal cyst    Coronary artery disease  cardiac stents     H/O cataract removal with insertion of prosthetic lens    Elective surgery  pt had bladder mass removed apporoximayly 6 weeks ago by Dr. Mccord        No Known Allergies      MEDICATIONS  (STANDING):  carvedilol 3.125 milliGRAM(s) Oral every 12 hours  donepezil 10 milliGRAM(s) Oral at bedtime  simvastatin 20 milliGRAM(s) Oral at bedtime  tamsulosin 0.8 milliGRAM(s) Oral at bedtime    MEDICATIONS  (PRN):  acetaminophen   Tablet .. 650 milliGRAM(s) Oral every 4 hours PRN Mild Pain (1 - 3)  calcium carbonate    500 mG (Tums) Chewable 3 Tablet(s) Chew every 6 hours PRN Dyspepsia  senna 2 Tablet(s) Oral at bedtime PRN Constipation      Social History:  Cigarettes:         neg           Alchohol:    neg             Illicit Drug Abuse:  neg  neg FH CAD    ROS: Negative other than as mentioned in HPI.    Vital Signs Last 24 Hrs  T(C): 36.4 (2021 16:39), Max: 36.8 (2021 15:16)  T(F): 97.5 (2021 16:39), Max: 98.2 (2021 15:16)  HR: 87 (2021 16:39) (73 - 87)  BP: 127/87 (2021 16:39) (123/80 - 133/81)  BP(mean): --  RR: 20 (2021 16:39) (16 - 20)  SpO2: 98% (2021 16:39) (97% - 98%)  ICU Vital Signs Last 24 Hrs  MARISA DIOR  I&O's Detail    I&O's Summary    Drug Dosing Weight  MARISA DIOR      PHYSICAL EXAM:  General: Appears well developed, well nourished alert and cooperative.  HEENT: Head; normocephalic, atraumatic.  Eyes: Pupils reactive, cornea wnl.  Neck: Supple, no nodes adenopathy, no NVD or carotid bruit or thyromegaly.  CARDIOVASCULAR: Normal S1 and S2, No murmur, rub, gallop or lift.   LUNGS: No rales, rhonchi or wheeze. Normal breath sounds bilaterally.  ABDOMEN: Soft, nontender without mass or organomegaly. bowel sounds normoactive.  EXTREMITIES: No clubbing, cyanosis or edema. Distal pulses wnl.   SKIN: warm and dry with normal turgor.  NEURO: Alert/oriented x 3/normal motor exam. No pathologic reflexes.    PSYCH: normal affect.        LABS:                        15.0   11.63 )-----------( 144      ( 2021 11:48 )             45.2     04-21    138  |  102  |  24.0<H>  ----------------------------<  91  4.1   |  25.0  |  0.95    Ca    8.7      2021 11:48    TPro  6.6  /  Alb  3.4  /  TBili  0.8  /  DBili  x   /  AST  36  /  ALT  7   /  AlkPhos  100      MARISA DIOR      PT/INR - ( 2021 11:48 )   PT: 13.7 sec;   INR: 1.19 ratio         PTT - ( 2021 11:48 )  PTT:32.0 sec  Urinalysis Basic - ( 2021 15:26 )    Color: Yellow / Appearance: Clear / S.015 / pH: x  Gluc: x / Ketone: Trace  / Bili: Negative / Urobili: Negative mg/dL   Blood: x / Protein: Negative mg/dL / Nitrite: Negative   Leuk Esterase: Negative / RBC: 3-5 /HPF / WBC x   Sq Epi: x / Non Sq Epi: x / Bacteria: x        RADIOLOGY & ADDITIONAL STUDIES:< from: Xray Chest 1 View-PORTABLE IMMEDIATE (Xray Chest 1 View-PORTABLE IMMEDIATE .) (21 @ 10:45) >  FINDINGS:    Single frontal view of the chest demonstrates the lungs to be clear. The cardiomediastinal silhouette is enlarged. No acute osseous abnormalities. Left-sided dual-chamber pacemaker. Osteopenia. Consider CT as clinically warranted.    IMPRESSION: No acute cardiopulmonary disease process. Cardiomegaly.            KALPESH PERDUE MD; Attending Radiologist  This document has been electronically signed. 2021 11:58AM    < end of copied text >      INTERPRETATION OF TELEMETRY (personally reviewed):    ECG:< from: 12 Lead ECG (21 @ 11:58) >  Diagnosis Line Ventricular-paced rhythm  Abnormal ECG    Confirmed by DEISI HAQ ([id number]) on 2021 3:10:30 PM    < end of copied text >      ECHO:< from: TTE Echo Complete w/Doppler (19 @ 14:52) >     PHYSICIAN INTERPRETATION:  Left Ventricle: Endocardial visualization was enhanced with intravenous   echo contrast. The left ventricular internal cavity size is normal.  Global LV systolic function was moderately decreased. Left ventricular   ejection fraction, by visual estimation, is 35 to 40%. Abnormal   (paradoxical) septal motion, consistent with left bundle branch block.   The left ventricular diastolic function could not be assessed in this   study.       LV Wall Scoring:  The apical septal segment, apical inferior segment, and apex are   hypokinetic.    Right Ventricle: The right ventricular size is normal. RV systolic   function is mildly reduced.  Left Atrium: Severely enlarged left atrium.  Right Atrium: Severely enlarged right atrium.  Pericardium: There is no evidence of pericardial effusion.  Mitral Valve: Structurally normal mitral valve, with normal leaflet   excursion. Mild thickening and calcification of the anterior and   posterior mitral valve leaflets. There is mild to moderate mitral annular   calcification. Moderate mitral valve regurgitation is seen. The MR jet is   centrally-directed.  Tricuspid Valve: The tricuspid valve is normal in structure.   Moderate-severe tricuspid regurgitation is visualized.  Aortic Valve: The aortic valve is trileaflet. Mild aortic valve   regurgitation is seen. Calcified aortic valve with noevidence of   significant stenosis.  Pulmonic Valve: Mild pulmonic valve regurgitation.  Aorta: The aortic root is normal in size and structure.  Venous: The inferior vena cava was normal sized, with respiratory size   variation greater than 50%.       Summary:   1. Left ventricular ejection fraction, by visual estimation, is 35 to   40%.   2. Moderately decreased global left ventricular systolic function.   3. Apical septal segment, apical inferior segment, and apex are abnormal   as described above.   4. Abnormal septal motion consistent with left bundle branch block.   5. The left ventricular diastolic function could not be assessed in this   study.   6. Normal left ventricular internal cavity size.   7. Severely enlarged left atrium.   8.Severely enlarged right atrium.   9. Mild to moderate mitral annular calcification.  10. Mild thickening and calcification of the anterior and posterior   mitral valve leaflets.  11. Moderate mitral valve regurgitation.  12. Calcified aortic valve with no evidence of significant stenosis.  13. Mild aortic regurgitation.  14. Moderate-severe tricuspid regurgitation.  15. Endocardial visualization was enhanced with intravenous echo contrast.  16. There is no evidence of pericardial effusion.    MD Jnoi Electronically signed on 2019 at 5:13:20 PM         < end of copied text >      STRESS TEST:

## 2021-04-21 NOTE — ED ADULT NURSE NOTE - OBJECTIVE STATEMENT
Assumed patient care at 1121, documenting as noted. Patient alert to self, confused to time, place, situation. Patient unable to confirm why he is in the ED, found with numerous blankets placed on him. No family at bedside for history or information of recent events. IV inserted, labs sent per order, medicated as ordered. Patient placed on cardiac monitor & . Paced rhythm, SpO2 98% room air. Respirations even & unlabored. Patient denies any pain, found to have abrasion to left knee. Incontinence care performed, placed into clean, dry gown. Assumed patient care at 1121, documenting as noted. Patient alert to self, confused to time, place, situation. Patient unable to confirm why he is in the ED, found with numerous blankets placed on him. No family at bedside for history or information of recent events. IV inserted, labs sent per order, medicated as ordered. Patient placed on cardiac monitor & . Paced rhythm, SpO2 98% room air. Respirations even & unlabored. Patient denies any pain, found to have abrasion to left knee. Incontinence care performed, placed into clean, dry gown.  *Patient noted to have Lidoderm patch in place to right hip.

## 2021-04-21 NOTE — ED ADULT TRIAGE NOTE - CHIEF COMPLAINT QUOTE
Pt with unwitnessed fall at home found by daughter who states pt reports feeling fine but wants him looked at. No obvious deformities noted and no hematomas or injuries noted, pt states he has no pain. Pt at dementia baseline as per family. Pt stopped taking anticoag 3 months ago.

## 2021-04-21 NOTE — H&P ADULT - NSHPPHYSICALEXAM_GEN_ALL_CORE
PHYSICAL EXAM:  Constitutional: No acute distress, alert and oriented by 1  HEENT: AT/NC, EOMI, PERRLA, Normal conjunctiva, no pharyngeal erythema, moist oral mucosa  Respiratory: CTA BL, equal breath sounds, no crackles or wheezing  Cardiovascular: RRR, no edema  Gastrointestinal: soft, Non-tender, Non-distended + Bowel sounds, no rebound or guarding  Musculoskeletal: No joint edema  Neurological: CN 2-12 grossly intact, no focal deficits  Skin: warm, dry and intact  Psychiatric: normal mood and affect

## 2021-04-21 NOTE — ED ADULT NURSE REASSESSMENT NOTE - NS ED NURSE REASSESS COMMENT FT1
Patient remains confused to time & situation, alert to self & place. Cardiac monitor in place. Denies any chest pain, shortness of breath, nausea or dizziness. Respirations even & unlabored. Saline lock in place, patent, negative s/s phlebitis or infiltration.

## 2021-04-21 NOTE — ED PROVIDER NOTE - OBJECTIVE STATEMENT
89 yo male pmh dementia  s/p unwitnessed fall this morning; as per ems, daughter heard patient fall in the bathroom and found pt on his back; pt complaining of pain in upper thighs; denies any headache, chest pain or back pain

## 2021-04-22 LAB
COVID-19 SPIKE DOMAIN AB INTERP: POSITIVE
COVID-19 SPIKE DOMAIN ANTIBODY RESULT: 183 U/ML — HIGH
SARS-COV-2 IGG+IGM SERPL QL IA: 183 U/ML — HIGH
SARS-COV-2 IGG+IGM SERPL QL IA: POSITIVE
T4 FREE SERPL-MCNC: 0.9 NG/DL — SIGNIFICANT CHANGE UP (ref 0.9–1.8)

## 2021-04-22 PROCEDURE — 99233 SBSQ HOSP IP/OBS HIGH 50: CPT

## 2021-04-22 RX ADMIN — TAMSULOSIN HYDROCHLORIDE 0.8 MILLIGRAM(S): 0.4 CAPSULE ORAL at 23:00

## 2021-04-22 RX ADMIN — SIMVASTATIN 20 MILLIGRAM(S): 20 TABLET, FILM COATED ORAL at 23:00

## 2021-04-22 RX ADMIN — DONEPEZIL HYDROCHLORIDE 10 MILLIGRAM(S): 10 TABLET, FILM COATED ORAL at 23:00

## 2021-04-22 RX ADMIN — CARVEDILOL PHOSPHATE 3.12 MILLIGRAM(S): 80 CAPSULE, EXTENDED RELEASE ORAL at 17:44

## 2021-04-22 NOTE — PROGRESS NOTE ADULT - ASSESSMENT
89 y/o M w/ PMH of CAD s/p stent, PPM, dementia, hypothyroidism was BIBA from home after being found down in bathroom by daughter.  No LOC reported however pt has advanced dementia and is a poor historian.         Unwitnessed mechanical fall vs less likely syncope  - CTH w/ moderate chronic microvascular changes but no acute transcortical infarction or hemorrhage  - Negative for fractures on CT neck and XRays  - Carotid duplex negative   - TTE pending  - PT/OT consulted to assess functional status  - PPM interrogation pending  - Cardiology consult noted      Hypothermia w/ mild Leukocytosis on admission  - Pt nontoxic appearing and normothermic.  Have low suspicion for infection  - Leukocytosis improving and likely reactive   - No pyuria on UA and no infiltrate/consolidatoin on CXR  - LA normal and cultures NTD  - Recieved rocephin/azithro in ED on arrival  - Will continue to monitor off antibiotics given low suspicion for infection at this time  - Monitor CBC daily to trend WBC and trend temperature curve      Subclinical hypothyroid   - TFTs could be altered in acute setting.  Repeat in 4-6 weeks   - If TSH >10 on repeat studies consider starting on low dose synthroid.       CAD s/p stents  - c/w home meds      Chronic HFrEF  - EF 35-40%  - Euvolemic at this time  - c/w home medications      Dementia  - c/w supportive care      DVT ppx: Lovenox    Dispo:  Pt medically stable at this time and anticipate possible d/c in 1-2 days.  Pt may benefit from KEV.  Will f/u PT eval for further recs.

## 2021-04-22 NOTE — PROGRESS NOTE ADULT - SUBJECTIVE AND OBJECTIVE BOX
Chief Complaint:  fall/hypothermia    SUBJECTIVE / OVERNIGHT EVENTS: No acute events reported overnight.  Pt is a poor historian however offers no acute complaints at this time.      Patient denies chest pain, SOB, abd pain, N/V, fever, chills, dysuria or any other complaints. All remainder ROS negative.       I&O's Summary        PHYSICAL EXAM:  Vital Signs Last 24 Hrs  T(C): 36.3 (2021 11:30), Max: 36.8 (2021 15:16)  T(F): 97.3 (2021 11:30), Max: 98.2 (2021 15:16)  HR: 80 (2021 11:30) (61 - 87)  BP: 122/76 (2021 11:30) (101/55 - 147/84)  BP(mean): --  RR: 20 (2021 11:30) (16 - 20)  SpO2: 94% (2021 11:30) (92% - 98%)      GENERAL: frail elderly male examined bedside, laying comfortably in bed in NAD  HEENT: NC/AT, moist oral mucosa, clear conjunctiva, sclera nonicteric  RESPIRATORY: Normal respiratory effort; CTA b/l, no wheezing, rhonchi, rales  CARDIOVASCULAR: RRR, normal S1 and S2, no murmur/rub/gallop  ABDOMEN: soft, NT/ND, normoactive bowel sounds, no rebound/guarding  EXTREMITIES: No cynaosis, no clubbing, no lower extremity edema; Peripheral pulses are 2+ bilaterally  PSYCH: affect appropriate and cooperative  NEUROLOGY: A+O to self and place, no focal neurologic deficits appreciated   SKIN: No rashes or no palpable lesions        LABS:                        15.0   11.63 )-----------( 144      ( 2021 11:48 )             45.2     04-21    138  |  102  |  24.0<H>  ----------------------------<  91  4.1   |  25.0  |  0.95    Ca    8.7      2021 11:48    TPro  6.6  /  Alb  3.4  /  TBili  0.8  /  DBili  x   /  AST  36  /  ALT  7   /  AlkPhos  100  04-21    PT/INR - ( 2021 11:48 )   PT: 13.7 sec;   INR: 1.19 ratio         PTT - ( 2021 11:48 )  PTT:32.0 sec      Urinalysis Basic - ( 2021 15:26 )    Color: Yellow / Appearance: Clear / S.015 / pH: x  Gluc: x / Ketone: Trace  / Bili: Negative / Urobili: Negative mg/dL   Blood: x / Protein: Negative mg/dL / Nitrite: Negative   Leuk Esterase: Negative / RBC: 3-5 /HPF / WBC x   Sq Epi: x / Non Sq Epi: x / Bacteria: x        CAPILLARY BLOOD GLUCOSE            RADIOLOGY & ADDITIONAL TESTS:          MEDICATIONS  (STANDING):  carvedilol 3.125 milliGRAM(s) Oral every 12 hours  donepezil 10 milliGRAM(s) Oral at bedtime  simvastatin 20 milliGRAM(s) Oral at bedtime  tamsulosin 0.8 milliGRAM(s) Oral at bedtime    MEDICATIONS  (PRN):  acetaminophen   Tablet .. 650 milliGRAM(s) Oral every 4 hours PRN Mild Pain (1 - 3)  calcium carbonate    500 mG (Tums) Chewable 3 Tablet(s) Chew every 6 hours PRN Dyspepsia  QUEtiapine 50 milliGRAM(s) Oral at bedtime PRN aggitation  senna 2 Tablet(s) Oral at bedtime PRN Constipation

## 2021-04-22 NOTE — PROGRESS NOTE ADULT - SUBJECTIVE AND OBJECTIVE BOX
[facility], THE [facility]ART [facility]ENTER                              [address] [address]                                                 PHONE: [phone]                                                 FAX: [phone]  -----------------------------------------------------------------------------------------------  Pt seen and examined. FU for syncope/fall    Overnight events/Complaints: Pt without complains. Sitting up in chair. Has progressive dementia Does not recall events.    Vital Signs Last 24 Hrs  T(C): 36.4 (22 Apr 2021 07:47), Max: 36.8 (21 Apr 2021 15:16)  T(F): 97.5 (22 Apr 2021 07:47), Max: 98.2 (21 Apr 2021 15:16)  HR: 66 (22 Apr 2021 07:47) (61 - 87)  BP: 147/84 (22 Apr 2021 07:47) (101/55 - 147/84)  BP(mean): --  RR: 20 (22 Apr 2021 07:47) (16 - 20)  SpO2: 92% (22 Apr 2021 07:47) (92% - 98%)  I&O's Summary      PHYSICAL EXAM:  Constitutional: Appears well developed, well nourished; alert and co-operative  HEENT:     Head: Normocephalic and atraumatic  Neck: supple. No JVD  Cardiovascular: regular S1 S2  Respiratory: Lungs clear to auscultation; no crepitations, no wheeze  Gastrointestinal:  Soft, Non-tender, + BS	  Musculoskeletal: Normal range of motion. No edema  Skin: Warm and dry. No cyanosis . No diaphoresis.  Neurologic: Normal strength and no tremor.        LABS:                        15.0   11.63 )-----------( 144      ( 21 Apr 2021 11:48 )             45.2     04-21    138  |  102  |  24.0<H>  ----------------------------<  91  4.1   |  25.0  |  0.95    Ca    8.7      21 Apr 2021 11:48    TPro  6.6  /  Alb  3.4  /  TBili  0.8  /  DBili  x   /  AST  36  /  ALT  7   /  AlkPhos  100  04-21        PT/INR - ( 21 Apr 2021 11:48 )   PT: 13.7 sec;   INR: 1.19 ratio         PTT - ( 21 Apr 2021 11:48 )  PTT:32.0 sec    RADIOLOGY & ADDITIONAL STUDIES: (reviewed)  CXR was independently visualized/reviewed  and demonstrated: clear lungs    ECG:< from: 12 Lead ECG (04.21.21 @ 11:58) >  Diagnosis Line Ventricular-paced rhythm  Abnormal ECG    Confirmed by DEISI HAQ ([id number]) on 4/21/2021 3:10:30 PM    < end of copied text >      ECHO:< from: TTE Echo Complete w/Doppler (05.17.19 @ 14:52) >     PHYSICIAN INTERPRETATION:  Left Ventricle: Endocardial visualization was enhanced with intravenous   echo contrast. The left ventricular internal cavity size is normal.  Global LV systolic function was moderately decreased. Left ventricular   ejection fraction, by visual estimation, is 35 to 40%. Abnormal   (paradoxical) septal motion, consistent with left bundle branch block.   The left ventricular diastolic function could not be assessed in this   study.       LV Wall Scoring:  The apical septal segment, apical inferior segment, and apex are   hypokinetic.    Right Ventricle: The right ventricular size is normal. RV systolic   function is mildly reduced.  Left Atrium: Severely enlarged left atrium.  Right Atrium: Severely enlarged right atrium.  Pericardium: There is no evidence of pericardial effusion.  Mitral Valve: Structurally normal mitral valve, with normal leaflet   excursion. Mild thickening and calcification of the anterior and   posterior mitral valve leaflets. There is mild to moderate mitral annular   calcification. Moderate mitral valve regurgitation is seen. The MR jet is   centrally-directed.  Tricuspid Valve: The tricuspid valve is normal in structure.   Moderate-severe tricuspid regurgitation is visualized.  Aortic Valve: The aortic valve is trileaflet. Mild aortic valve   regurgitation is seen. Calcified aortic valve with noevidence of   significant stenosis.  Pulmonic Valve: Mild pulmonic valve regurgitation.  Aorta: The aortic root is normal in size and structure.  Venous: The inferior vena cava was normal sized, with respiratory size   variation greater than 50%.       Summary:   1. Left ventricular ejection fraction, by visual estimation, is 35 to   40%.   2. Moderately decreased global left ventricular systolic function.   3. Apical septal segment, apical inferior segment, and apex are abnormal   as described above.   4. Abnormal septal motion consistent with left bundle branch block.   5. The left ventricular diastolic function could not be assessed in this   study.   6. Normal left ventricular internal cavity size.   7. Severely enlarged left atrium.   8.Severely enlarged right atrium.   9. Mild to moderate mitral annular calcification.  10. Mild thickening and calcification of the anterior and posterior   mitral valve leaflets.  11. Moderate mitral valve regurgitation.  12. Calcified aortic valve with no evidence of significant stenosis.  13. Mild aortic regurgitation.  14. Moderate-severe tricuspid regurgitation.  15. Endocardial visualization was enhanced with intravenous echo contrast.  16. There is no evidence of pericardial effusion.    MD Joni Electronically signed on 5/17/2019 at 5:13:20 PM       TELEMETRY independently visualized/reviewed and demonstrated : paced rhythm    MEDICATIONS:(reviewed)  MEDICATIONS  (STANDING):  carvedilol 3.125 milliGRAM(s) Oral every 12 hours  donepezil 10 milliGRAM(s) Oral at bedtime  simvastatin 20 milliGRAM(s) Oral at bedtime  tamsulosin 0.8 milliGRAM(s) Oral at bedtime      ASSESSMENT AND PLAN:    89y Male with prior history of s/p PPM with normal function/VVI, rare PAF not AC candidate, progressive dementia, stable CAD s/p remote MI remote PCI w/o angina, ischemic CM compensated w/o evidence of CHF with fall vs syncope ? re aricept      Telemetry stable with paced rhythm  cont outpt meds  BP controlled  will FU interrogation on  PPM

## 2021-04-23 LAB
-  STREPTOCOCCUS SP. (NOT GRP A, B OR S PNEUMONIAE): SIGNIFICANT CHANGE UP
ANION GAP SERPL CALC-SCNC: 11 MMOL/L — SIGNIFICANT CHANGE UP (ref 5–17)
BUN SERPL-MCNC: 21 MG/DL — HIGH (ref 8–20)
CALCIUM SERPL-MCNC: 8.2 MG/DL — LOW (ref 8.6–10.2)
CHLORIDE SERPL-SCNC: 104 MMOL/L — SIGNIFICANT CHANGE UP (ref 98–107)
CO2 SERPL-SCNC: 24 MMOL/L — SIGNIFICANT CHANGE UP (ref 22–29)
CREAT SERPL-MCNC: 0.84 MG/DL — SIGNIFICANT CHANGE UP (ref 0.5–1.3)
CULTURE RESULTS: SIGNIFICANT CHANGE UP
GLUCOSE SERPL-MCNC: 92 MG/DL — SIGNIFICANT CHANGE UP (ref 70–99)
GRAM STN FLD: SIGNIFICANT CHANGE UP
HCT VFR BLD CALC: 44.2 % — SIGNIFICANT CHANGE UP (ref 39–50)
HGB BLD-MCNC: 14.4 G/DL — SIGNIFICANT CHANGE UP (ref 13–17)
MCHC RBC-ENTMCNC: 30.4 PG — SIGNIFICANT CHANGE UP (ref 27–34)
MCHC RBC-ENTMCNC: 32.6 GM/DL — SIGNIFICANT CHANGE UP (ref 32–36)
MCV RBC AUTO: 93.4 FL — SIGNIFICANT CHANGE UP (ref 80–100)
METHOD TYPE: SIGNIFICANT CHANGE UP
ORGANISM # SPEC MICROSCOPIC CNT: SIGNIFICANT CHANGE UP
PLATELET # BLD AUTO: 129 K/UL — LOW (ref 150–400)
POTASSIUM SERPL-MCNC: 4.3 MMOL/L — SIGNIFICANT CHANGE UP (ref 3.5–5.3)
POTASSIUM SERPL-SCNC: 4.3 MMOL/L — SIGNIFICANT CHANGE UP (ref 3.5–5.3)
PROCALCITONIN SERPL-MCNC: 0.05 NG/ML — SIGNIFICANT CHANGE UP (ref 0.02–0.1)
RBC # BLD: 4.73 M/UL — SIGNIFICANT CHANGE UP (ref 4.2–5.8)
RBC # FLD: 12.8 % — SIGNIFICANT CHANGE UP (ref 10.3–14.5)
SODIUM SERPL-SCNC: 138 MMOL/L — SIGNIFICANT CHANGE UP (ref 135–145)
SPECIMEN SOURCE: SIGNIFICANT CHANGE UP
SPECIMEN SOURCE: SIGNIFICANT CHANGE UP
WBC # BLD: 10.31 K/UL — SIGNIFICANT CHANGE UP (ref 3.8–10.5)
WBC # FLD AUTO: 10.31 K/UL — SIGNIFICANT CHANGE UP (ref 3.8–10.5)

## 2021-04-23 PROCEDURE — 93306 TTE W/DOPPLER COMPLETE: CPT | Mod: 26

## 2021-04-23 PROCEDURE — 99233 SBSQ HOSP IP/OBS HIGH 50: CPT

## 2021-04-23 RX ADMIN — SIMVASTATIN 20 MILLIGRAM(S): 20 TABLET, FILM COATED ORAL at 21:31

## 2021-04-23 RX ADMIN — TAMSULOSIN HYDROCHLORIDE 0.8 MILLIGRAM(S): 0.4 CAPSULE ORAL at 21:31

## 2021-04-23 RX ADMIN — CARVEDILOL PHOSPHATE 3.12 MILLIGRAM(S): 80 CAPSULE, EXTENDED RELEASE ORAL at 16:56

## 2021-04-23 RX ADMIN — CARVEDILOL PHOSPHATE 3.12 MILLIGRAM(S): 80 CAPSULE, EXTENDED RELEASE ORAL at 05:44

## 2021-04-23 RX ADMIN — DONEPEZIL HYDROCHLORIDE 10 MILLIGRAM(S): 10 TABLET, FILM COATED ORAL at 21:33

## 2021-04-23 NOTE — PROGRESS NOTE ADULT - ASSESSMENT
89 y/o M w/ PMH of CAD s/p stent, PPM, dementia, hypothyroidism was BIBA from home after being found down in bathroom by daughter.  No LOC reported however pt has advanced dementia and is a poor historian.         Unwitnessed mechanical fall vs less likely syncope  - CTH w/ moderate chronic microvascular changes but no acute transcortical infarction or hemorrhage  - Negative for fractures on CT neck and XRays  - Carotid duplex negative   - TTE pending  - PT/OT consulted to assess functional status  - PPM interrogation pending  - Cardiology consult noted      Hypothermia w/ mild Leukocytosis on admission  - Pt nontoxic appearing and normothermic.  Have low suspicion for infection  - Leukocytosis improving and likely reactive   - No pyuria on UA and no infiltrate/consolidatoin on CXR  - LA normal and cultures NTD  - Recieved rocephin/azithro in ED on arrival  - Will continue to monitor off antibiotics given low suspicion for infection at this time  - Monitor CBC daily to trend WBC and trend temperature curve      Subclinical hypothyroid   - TFTs could be altered in acute setting.  Repeat in 4-6 weeks   - If TSH >10 on repeat studies consider starting on low dose synthroid.       CAD s/p stents  - c/w home meds      Chronic HFrEF  - EF 35-40%  - Euvolemic at this time  - c/w home medications      Dementia  - c/w supportive care      DVT ppx: Lovenox    Dispo:  Pt medically stable at this time and anticipate possible d/c in 1-2 days.  Pt may benefit from KEV.  Will f/u PT eval for further recs.            89 y/o M w/ PMH of CAD s/p stent, PPM, dementia, hypothyroidism was BIBA from home after being found down in bathroom by daughter.  No LOC reported however pt has advanced dementia and is a poor historian.   Hospital course has been uneventful.        Unwitnessed mechanical fall vs less likely syncope  - CTH w/ moderate chronic microvascular changes but no acute transcortical infarction or hemorrhage  - Negative for fractures on CT neck and XRays  - Carotid duplex negative   - TTE pending  - PT/OT consulted to assess functional status  - PPM interrogation pending  - Cardiology consult noted      Hypothermia w/ mild Leukocytosis on admission  - Pt nontoxic appearing and normothermic.  Have low suspicion for infection  - Leukocytosis improving and likely reactive   - No pyuria on UA and no infiltrate/consolidatoin on CXR  - LA normal and cultures NTD  - Recieved rocephin/azithro in ED on arrival  - Will continue to monitor off antibiotics given low suspicion for infection at this time  - Monitor CBC daily to trend WBC and trend temperature curve      Subclinical hypothyroid   - TFTs could be altered in acute setting.  Repeat in 4-6 weeks   - If TSH >10 on repeat studies consider starting on low dose synthroid.       CAD s/p stents  - c/w home meds      Chronic HFrEF  - Last known EF 35-40%  - Repeat TTE pending  - Euvolemic at this time  - c/w home medications      Dementia  - c/w supportive care      DVT ppx: Lovenox    Dispo:  Pt medically stable at this time and anticipate possible d/c in 1-2 days.  Pt may benefit from KEV vs 24hr home care per discussion w/ PT.  Will call daughter to update and discuss dipo.

## 2021-04-23 NOTE — PROGRESS NOTE ADULT - SUBJECTIVE AND OBJECTIVE BOX
[facility], [facility]                                   [address] [place] [address]                                                      PHONE: [phone]                                                         FAX: [phone]  [email or web address] (by [email or web address]  ---------------------------------------------------------------------------------------------------------------------------------    Overnight events/patient complaints:  Patient feeling well overnight.      PAST MEDICAL & SURGICAL HISTORY:  Reflux    Anginal pain    Coronary artery disease    Enlarged prostate    Heart attack    TIA (transient ischemic attack)    Cataract    Stented coronary artery  x1    BPH (benign prostatic hypertrophy)    Hypothyroidism    Dementia    Alcohol abuse    S/P appendectomy    H/O: hemorrhoidectomy    H/O pilonidal cyst    Coronary artery disease  cardiac stents     H/O cataract removal with insertion of prosthetic lens    Elective surgery  pt had bladder mass removed apporoximayly 6 weeks ago by Dr. Mccord        No Known Allergies    MEDICATIONS  (STANDING):  carvedilol 3.125 milliGRAM(s) Oral every 12 hours  donepezil 10 milliGRAM(s) Oral at bedtime  simvastatin 20 milliGRAM(s) Oral at bedtime  tamsulosin 0.8 milliGRAM(s) Oral at bedtime    MEDICATIONS  (PRN):  acetaminophen   Tablet .. 650 milliGRAM(s) Oral every 4 hours PRN Mild Pain (1 - 3)  calcium carbonate    500 mG (Tums) Chewable 3 Tablet(s) Chew every 6 hours PRN Dyspepsia  QUEtiapine 50 milliGRAM(s) Oral at bedtime PRN aggitation  senna 2 Tablet(s) Oral at bedtime PRN Constipation      Vital Signs Last 24 Hrs  T(C): 36.3 (2021 07:59), Max: 36.5 (2021 19:16)  T(F): 97.4 (2021 07:59), Max: 97.7 (2021 19:16)  HR: 88 (2021 07:59) (75 - 93)  BP: 129/88 (2021 07:59) (122/76 - 155/83)  BP(mean): --  RR: 18 (2021 07:59) (18 - 20)  SpO2: 93% (2021 07:59) (93% - 98%)  ICU Vital Signs Last 24 Hrs  MARISA DIOR  I&O's Detail    I&O's Summary    Drug Dosing Weight  MARISA DIOR      PHYSICAL EXAM:  General: Appears well developed, well nourished alert and cooperative.  HEENT: Head; normocephalic, atraumatic.  Eyes: Pupils reactive, cornea wnl.  Neck: Supple, no nodes adenopathy, no NVD or carotid bruit or thyromegaly.  CARDIOVASCULAR: Normal S1 and S2, No murmur, rub, gallop or lift.   LUNGS: No rales, rhonchi or wheeze. Normal breath sounds bilaterally.  ABDOMEN: Soft, nontender without mass or organomegaly. bowel sounds normoactive.  EXTREMITIES: No clubbing, cyanosis or edema. Distal pulses wnl.   SKIN: warm and dry with normal turgor.  NEURO: Alert/oriented x 3/normal motor exam.   PSYCH: normal affect.        LABS:                        14.4   10.31 )-----------( 129      ( 2021 08:14 )             44.2         138  |  104  |  21.0<H>  ----------------------------<  92  4.3   |  24.0  |  0.84    Ca    8.2<L>      2021 08:14    TPro  6.6  /  Alb  3.4  /  TBili  0.8  /  DBili  x   /  AST  36  /  ALT  7   /  AlkPhos  100      MARISA DIOR      PT/INR - ( 2021 11:48 )   PT: 13.7 sec;   INR: 1.19 ratio         PTT - ( 2021 11:48 )  PTT:32.0 sec  Urinalysis Basic - ( 2021 15:26 )    Color: Yellow / Appearance: Clear / S.015 / pH: x  Gluc: x / Ketone: Trace  / Bili: Negative / Urobili: Negative mg/dL   Blood: x / Protein: Negative mg/dL / Nitrite: Negative   Leuk Esterase: Negative / RBC: 3-5 /HPF / WBC x   Sq Epi: x / Non Sq Epi: x / Bacteria: x        RADIOLOGY & ADDITIONAL STUDIES:    INTERPRETATION OF TELEMETRY (personally reviewed):n V pacing    ECG:    ECHO:    STRESS TEST:    CARDIAC CATHETERIZATION:    ASSESSMENT AND PLAN:  In summary, MARISA DIOR is an 89y Male with past medical history significant for pAF (not on AC), demential, CAD s/p remote PCI, PPM p/w fall v. syncope.    -tele normal  -awaiting results of PPM interrogation to see if there are any tachyarrhythmias contributing to fall    Thank you for allowing [facility] to participate in the care of this patient.  Please feel free to call with any questions or concerns.

## 2021-04-23 NOTE — PHYSICAL THERAPY INITIAL EVALUATION ADULT - PERTINENT HX OF CURRENT PROBLEM, REHAB EVAL
89 y/o M w/ PMH of CAD s/p stent, PPM, dementia, hypothyroidism was BIBA from home after being found down in bathroom by daughter.  No LOC reported however pt has advanced dementia and is a poor historian

## 2021-04-23 NOTE — PROGRESS NOTE ADULT - SUBJECTIVE AND OBJECTIVE BOX
Chief Complaint:  fall/hypothermia    SUBJECTIVE / OVERNIGHT EVENTS: No acute events reported overnight.  Pt is a poor historian however offers no acute complaints at this time.      Patient denies chest pain, SOB, abd pain, N/V, fever, chills, dysuria or any other complaints. All remainder ROS negative.       I&O's Summary        PHYSICAL EXAM:  Vital Signs Last 24 Hrs  T(C): 36.4 (2021 04:42), Max: 36.5 (2021 19:16)  T(F): 97.5 (2021 04:42), Max: 97.7 (2021 19:16)  HR: 75 (2021 04:42) (75 - 93)  BP: 155/83 (2021 04:42) (122/76 - 155/83)  BP(mean): --  RR: 19 (2021 04:42) (19 - 20)  SpO2: 98% (2021 04:42) (93% - 98%)      GENERAL: frail elderly male examined bedside, laying comfortably in bed in NAD  HEENT: NC/AT, moist oral mucosa, clear conjunctiva, sclera nonicteric  RESPIRATORY: Normal respiratory effort; CTA b/l, no wheezing, rhonchi, rales  CARDIOVASCULAR: RRR, normal S1 and S2, no murmur/rub/gallop  ABDOMEN: soft, NT/ND, normoactive bowel sounds, no rebound/guarding  EXTREMITIES: No cynaosis, no clubbing, no lower extremity edema; Peripheral pulses are 2+ bilaterally  PSYCH: affect appropriate and cooperative  NEUROLOGY: A+O to self and place, no focal neurologic deficits appreciated   SKIN: No rashes or no palpable lesions        LABS:                                        15.0   11.63 )-----------( 144      ( 2021 11:48 )             45.2       04-21    138  |  102  |  24.0<H>  ----------------------------<  91  4.1   |  25.0  |  0.95    Ca    8.7      2021 11:48    TPro  6.6  /  Alb  3.4  /  TBili  0.8  /  DBili  x   /  AST  36  /  ALT  7   /  AlkPhos  100  04-      Urinalysis Basic - ( 2021 15:26 )    Color: Yellow / Appearance: Clear / S.015 / pH: x  Gluc: x / Ketone: Trace  / Bili: Negative / Urobili: Negative mg/dL   Blood: x / Protein: Negative mg/dL / Nitrite: Negative   Leuk Esterase: Negative / RBC: 3-5 /HPF / WBC x   Sq Epi: x / Non Sq Epi: x / Bacteria: x        CAPILLARY BLOOD GLUCOSE            RADIOLOGY & ADDITIONAL TESTS:          MEDICATIONS  (STANDING):  carvedilol 3.125 milliGRAM(s) Oral every 12 hours  donepezil 10 milliGRAM(s) Oral at bedtime  simvastatin 20 milliGRAM(s) Oral at bedtime  tamsulosin 0.8 milliGRAM(s) Oral at bedtime    MEDICATIONS  (PRN):  acetaminophen   Tablet .. 650 milliGRAM(s) Oral every 4 hours PRN Mild Pain (1 - 3)  calcium carbonate    500 mG (Tums) Chewable 3 Tablet(s) Chew every 6 hours PRN Dyspepsia  QUEtiapine 50 milliGRAM(s) Oral at bedtime PRN aggitation  senna 2 Tablet(s) Oral at bedtime PRN Constipation                                     Chief Complaint:  fall/hypothermia    SUBJECTIVE / OVERNIGHT EVENTS: No acute events reported overnight.  Pt is a poor historian however offers no acute complaints at this time.      Patient denies chest pain, SOB, abd pain, N/V, fever, chills, dysuria or any other complaints. All remainder ROS negative.       I&O's Summary        PHYSICAL EXAM:  Vital Signs Last 24 Hrs  T(C): 36.4 (2021 04:42), Max: 36.5 (2021 19:16)  T(F): 97.5 (2021 04:42), Max: 97.7 (2021 19:16)  HR: 75 (2021 04:42) (75 - 93)  BP: 155/83 (2021 04:42) (122/76 - 155/83)  BP(mean): --  RR: 19 (2021 04:42) (19 - 20)  SpO2: 98% (2021 04:42) (93% - 98%)      GENERAL: frail elderly male examined bedside, laying comfortably in bed in NAD  HEENT: NC/AT, moist oral mucosa, clear conjunctiva, sclera nonicteric  RESPIRATORY: Normal respiratory effort; CTA b/l, no wheezing, rhonchi, rales  CARDIOVASCULAR: RRR, normal S1 and S2, no murmur/rub/gallop  ABDOMEN: soft, NT/ND, normoactive bowel sounds, no rebound/guarding  EXTREMITIES: No cynaosis, no clubbing, no lower extremity edema; Peripheral pulses are 2+ bilaterally  PSYCH: affect appropriate and cooperative  NEUROLOGY: A+O to self and place, no focal neurologic deficits appreciated   SKIN: No rashes or no palpable lesions        LABS:                                       14.4   10.31 )-----------( 129      ( 2021 08:14 )             44.2     04-23    138  |  104  |  21.0<H>  ----------------------------<  92  4.3   |  24.0  |  0.84    Ca    8.2<L>      2021 08:14    TPro  6.6  /  Alb  3.4  /  TBili  0.8  /  DBili  x   /  AST  36  /  ALT  7   /  AlkPhos  100  04-21      Urinalysis Basic - ( 2021 15:26 )    Color: Yellow / Appearance: Clear / S.015 / pH: x  Gluc: x / Ketone: Trace  / Bili: Negative / Urobili: Negative mg/dL   Blood: x / Protein: Negative mg/dL / Nitrite: Negative   Leuk Esterase: Negative / RBC: 3-5 /HPF / WBC x   Sq Epi: x / Non Sq Epi: x / Bacteria: x        CAPILLARY BLOOD GLUCOSE            RADIOLOGY & ADDITIONAL TESTS:          MEDICATIONS  (STANDING):  carvedilol 3.125 milliGRAM(s) Oral every 12 hours  donepezil 10 milliGRAM(s) Oral at bedtime  simvastatin 20 milliGRAM(s) Oral at bedtime  tamsulosin 0.8 milliGRAM(s) Oral at bedtime    MEDICATIONS  (PRN):  acetaminophen   Tablet .. 650 milliGRAM(s) Oral every 4 hours PRN Mild Pain (1 - 3)  calcium carbonate    500 mG (Tums) Chewable 3 Tablet(s) Chew every 6 hours PRN Dyspepsia  QUEtiapine 50 milliGRAM(s) Oral at bedtime PRN aggitation  senna 2 Tablet(s) Oral at bedtime PRN Constipation

## 2021-04-23 NOTE — OCCUPATIONAL THERAPY INITIAL EVALUATION ADULT - MODIFIED CLINICAL TEST OF SENSORY INTEGRATION IN BALANCE TEST
pt with loss of balance in standing and ambulation with rw, unsteady on feet, min/mod  assist with rw

## 2021-04-23 NOTE — OCCUPATIONAL THERAPY INITIAL EVALUATION ADULT - LOWER BODY DRESSING, ASSISTIVE DEVICE, OT EVAL
pt able to don and doff socks seated without asssit, however requires assist for threading garments and managing clothing in standing due to unsteadiness

## 2021-04-23 NOTE — PHYSICAL THERAPY INITIAL EVALUATION ADULT - ADDITIONAL COMMENTS
pt is a poor historian. per chart, pt lives in an apartment in the daughter's house with 1 step to enter. pt amb with RW. has HHA 8 hours/day, 7 days/week. has RW, wc, hospital bed, shower chair, commode.

## 2021-04-23 NOTE — CHART NOTE - NSCHARTNOTEFT_GEN_A_CORE
Patient with +blood cultures isolated to 1 bottle. ?Contaminant given second bottle is negative for growth.  Patient remains afebrile, is without leukocytosis, and is non-toxic appearing.  Repeat blood cultures and procalcitonin ordered stat.  Will continue to monitor patient off of antibiotics for now.  If patient is with fever, leukocytosis, elevated procal, or sensitivity shows MRSA - will start antibiotics.  Discussed with Dr. Early.

## 2021-04-24 ENCOUNTER — TRANSCRIPTION ENCOUNTER (OUTPATIENT)
Age: 86
End: 2021-04-24

## 2021-04-24 LAB
ANION GAP SERPL CALC-SCNC: 14 MMOL/L — SIGNIFICANT CHANGE UP (ref 5–17)
BUN SERPL-MCNC: 21 MG/DL — HIGH (ref 8–20)
CALCIUM SERPL-MCNC: 8.7 MG/DL — SIGNIFICANT CHANGE UP (ref 8.6–10.2)
CHLORIDE SERPL-SCNC: 102 MMOL/L — SIGNIFICANT CHANGE UP (ref 98–107)
CO2 SERPL-SCNC: 20 MMOL/L — LOW (ref 22–29)
CREAT SERPL-MCNC: 1.05 MG/DL — SIGNIFICANT CHANGE UP (ref 0.5–1.3)
CULTURE RESULTS: SIGNIFICANT CHANGE UP
GLUCOSE SERPL-MCNC: 87 MG/DL — SIGNIFICANT CHANGE UP (ref 70–99)
HCT VFR BLD CALC: 48.6 % — SIGNIFICANT CHANGE UP (ref 39–50)
HGB BLD-MCNC: 15.8 G/DL — SIGNIFICANT CHANGE UP (ref 13–17)
MCHC RBC-ENTMCNC: 30.6 PG — SIGNIFICANT CHANGE UP (ref 27–34)
MCHC RBC-ENTMCNC: 32.5 GM/DL — SIGNIFICANT CHANGE UP (ref 32–36)
MCV RBC AUTO: 94 FL — SIGNIFICANT CHANGE UP (ref 80–100)
ORGANISM # SPEC MICROSCOPIC CNT: SIGNIFICANT CHANGE UP
PLATELET # BLD AUTO: 145 K/UL — LOW (ref 150–400)
POTASSIUM SERPL-MCNC: 4.4 MMOL/L — SIGNIFICANT CHANGE UP (ref 3.5–5.3)
POTASSIUM SERPL-SCNC: 4.4 MMOL/L — SIGNIFICANT CHANGE UP (ref 3.5–5.3)
RBC # BLD: 5.17 M/UL — SIGNIFICANT CHANGE UP (ref 4.2–5.8)
RBC # FLD: 12.8 % — SIGNIFICANT CHANGE UP (ref 10.3–14.5)
SODIUM SERPL-SCNC: 136 MMOL/L — SIGNIFICANT CHANGE UP (ref 135–145)
WBC # BLD: 10.27 K/UL — SIGNIFICANT CHANGE UP (ref 3.8–10.5)
WBC # FLD AUTO: 10.27 K/UL — SIGNIFICANT CHANGE UP (ref 3.8–10.5)

## 2021-04-24 PROCEDURE — 99233 SBSQ HOSP IP/OBS HIGH 50: CPT

## 2021-04-24 RX ORDER — ENOXAPARIN SODIUM 100 MG/ML
40 INJECTION SUBCUTANEOUS DAILY
Refills: 0 | Status: DISCONTINUED | OUTPATIENT
Start: 2021-04-24 | End: 2021-04-26

## 2021-04-24 RX ADMIN — DONEPEZIL HYDROCHLORIDE 10 MILLIGRAM(S): 10 TABLET, FILM COATED ORAL at 21:57

## 2021-04-24 RX ADMIN — CARVEDILOL PHOSPHATE 3.12 MILLIGRAM(S): 80 CAPSULE, EXTENDED RELEASE ORAL at 05:27

## 2021-04-24 RX ADMIN — TAMSULOSIN HYDROCHLORIDE 0.8 MILLIGRAM(S): 0.4 CAPSULE ORAL at 21:57

## 2021-04-24 RX ADMIN — CARVEDILOL PHOSPHATE 3.12 MILLIGRAM(S): 80 CAPSULE, EXTENDED RELEASE ORAL at 17:32

## 2021-04-24 RX ADMIN — SIMVASTATIN 20 MILLIGRAM(S): 20 TABLET, FILM COATED ORAL at 21:57

## 2021-04-24 NOTE — DISCHARGE NOTE PROVIDER - REASON FOR ADMISSION
Pt called stating that she did pick-up the Xanax 1 mg back on 9/21/18,however pt decided to put some of her Xanax in a different pill bottle to take to work since her co-worker's look thru her drawer'sNow the pt need's another refill since the one's she still has at home she lost,pt has already taken one today however she is still having a Panic Attack and she is having them more frequently. Pt state's that a co-worker has spoken to her supervisor and now she is in jeopardy of losing her job. Pt is seeing a Bautista Rocks at a [facility] on [address] [facility]  pt would like to have Cecille Pitcher or his nurse call her pt's # [phone]. Fall/hypothermia

## 2021-04-24 NOTE — DISCHARGE NOTE PROVIDER - NSDCCPCAREPLAN_GEN_ALL_CORE_FT
PRINCIPAL DISCHARGE DIAGNOSIS  Diagnosis: Hypothermia, initial encounter  Assessment and Plan of Treatment:       SECONDARY DISCHARGE DIAGNOSES  Diagnosis: Fall  Assessment and Plan of Treatment:     Diagnosis: Chronic systolic congestive heart failure  Assessment and Plan of Treatment:     Diagnosis: Hypothyroid  Assessment and Plan of Treatment:      PRINCIPAL DISCHARGE DIAGNOSIS  Diagnosis: Hypothermia, initial encounter  Assessment and Plan of Treatment: Resolved.  Follow up with primary doctor.      SECONDARY DISCHARGE DIAGNOSES  Diagnosis: Fall  Assessment and Plan of Treatment: KEV    Diagnosis: Chronic systolic congestive heart failure  Assessment and Plan of Treatment: Continue current medications as prescribed.  Follow up with primary doctor and cardiology.    Diagnosis: Hypothyroid  Assessment and Plan of Treatment: Continue current medications as prescribed.  Follow up with primary doctor.    Repeat TFTs in 4-6weeks.

## 2021-04-24 NOTE — DISCHARGE NOTE PROVIDER - NSDCMRMEDTOKEN_GEN_ALL_CORE_FT
carvedilol 3.125 mg oral tablet: 1 tab(s) orally every 12 hours  donepezil 10 mg oral tablet: 1 tab(s) orally once a day (at bedtime)  SEROquel 50 mg oral tablet: 1 tab(s) orally once a day (at bedtime), As Needed for aggitation  simvastatin 20 mg oral tablet: 1 tab(s) orally once a day (at bedtime)  terazosin 5 mg oral capsule: 1 cap(s) orally once a day (at bedtime)   carvedilol 3.125 mg oral tablet: 1 tab(s) orally every 12 hours  donepezil 10 mg oral tablet: 1 tab(s) orally once a day (at bedtime)  senna oral tablet: 2 tab(s) orally once a day (at bedtime), As needed, Constipation  SEROquel 50 mg oral tablet: 1 tab(s) orally once a day (at bedtime), As Needed for aggitation  simvastatin 20 mg oral tablet: 1 tab(s) orally once a day (at bedtime)  terazosin 5 mg oral capsule: 1 cap(s) orally once a day (at bedtime)

## 2021-04-24 NOTE — PROGRESS NOTE ADULT - SUBJECTIVE AND OBJECTIVE BOX
MARISA DIOR    [id number]    89y      Male    CC: fall - Does not recollect the situation how he fell   feels well currently  no complaints        INTERVAL HPI/OVERNIGHT EVENTS: no acute events     REVIEW OF SYSTEMS:    CONSTITUTIONAL: No fever, fatigue  RESPIRATORY: No cough, wheezing, No shortness of breath  CARDIOVASCULAR: No chest pain, palpitations  GASTROINTESTINAL: No abdominal or epigastric pain. No nausea, vomiting  NEUROLOGICAL: No headaches      Vital Signs Last 24 Hrs  T(C): 36.3 (24 Apr 2021 09:13), Max: 36.7 (23 Apr 2021 20:25)  T(F): 97.4 (24 Apr 2021 09:13), Max: 98.1 (24 Apr 2021 05:16)  HR: 62 (24 Apr 2021 09:13) (57 - 78)  BP: 111/71 (24 Apr 2021 09:13) (111/71 - 140/88)  BP(mean): --  RR: 18 (24 Apr 2021 09:13) (18 - 18)  SpO2: 96% (24 Apr 2021 09:13) (96% - 98%)    PHYSICAL EXAM:    GENERAL: NAD, well-groomed  HEENT: PERRL, +EOMI  NECK: soft, Supple, No JVD,   CHEST/LUNG: Clear to percussion bilaterally; No wheezing  HEART: S1S2+, Regular rate and rhythm; No murmurs  ABDOMEN: Soft, Nontender, Nondistended; Bowel sounds present  EXTREMITIES:  No clubbing, cyanosis, or edema  SKIN: No rashes or lesions  NEURO: AAOX3, forgertful        LABS:                        15.8   10.27 )-----------( 145      ( 24 Apr 2021 06:21 )             48.6     04-24    136  |  102  |  21.0<H>  ----------------------------<  87  4.4   |  20.0<L>  |  1.05    Ca    8.7      24 Apr 2021 06:21              MEDICATIONS  (STANDING):  carvedilol 3.125 milliGRAM(s) Oral every 12 hours  donepezil 10 milliGRAM(s) Oral at bedtime  simvastatin 20 milliGRAM(s) Oral at bedtime  tamsulosin 0.8 milliGRAM(s) Oral at bedtime    MEDICATIONS  (PRN):  acetaminophen   Tablet .. 650 milliGRAM(s) Oral every 4 hours PRN Mild Pain (1 - 3)  calcium carbonate    500 mG (Tums) Chewable 3 Tablet(s) Chew every 6 hours PRN Dyspepsia  QUEtiapine 50 milliGRAM(s) Oral at bedtime PRN aggitation  senna 2 Tablet(s) Oral at bedtime PRN Constipation      RADIOLOGY & ADDITIONAL TESTS:

## 2021-04-24 NOTE — DISCHARGE NOTE PROVIDER - CARE PROVIDER_API CALL
Donnie Barriga (MD)  Cardiac Electrophysiology; Cardiovascular Disease  [address] [place] [address]  Phone: [phone]  Fax: [phone]  Follow Up Time:

## 2021-04-24 NOTE — DISCHARGE NOTE PROVIDER - HOSPITAL COURSE
89 y/o M w/ PMH of CAD s/p stent, PPM, dementia, hypothyroidism was BIBA from home after being found down in bathroom by daughter.  No LOC reported however pt has advanced dementia and is a poor historian.   Hospital course has been uneventful.  CT head - no acute changes, Carotid doppler, ECHO - no changes. cardiology was consulted - pacemaker was interrogated. sepsis work up was done 2/2 hypothermia on admission. 1 of 2 blood cultures grew streptococcus. Clinically pt non-toxic appearing. CXR- UA - all neg. repeat blood cultures         Unwitnessed likely mechanical fall vs less likely syncope  - CTH w/ moderate chronic microvascular changes but no acute transcortical infarction or hemorrhage  - Negative for fractures on CT neck and XRays  - Carotid duplex negative   - TTE - same as prior ECHO  - PT/OT consulted to assess functional status - [facility] Vs home with 24/7 assist  - PPM interrogation pending  - Cardiology recs       Hypothermia w/ mild Leukocytosis on admission  - Pt nontoxic appearing and normothermic.  Have low suspicion for infection  - Leukocytosis improving and likely reactive   - No pyuria on UA and no infiltrate/consolidatoin on CXR  - LA normal and cultures NTD  - Recieved rocephin/azithro in ED on arrival  - Will continue to monitor off antibiotics given low suspicion for infection at this time  - Monitor CBC daily to trend WBC and trend temperature curve      Subclinical hypothyroid   - TFTs could be altered in acute setting.  Repeat in 4-6 weeks   - If TSH >10 on repeat studies consider starting on low dose synthroid.       CAD s/p stents  - c/w home meds      Chronic HFrEF  -EF 35-40%  - Euvolemic at this time  - c/w home medications      Dementia  - c/w supportive care       89 y/o M w/ PMH of CAD s/p stent, PPM, dementia, hypothyroidism was BIBA from home after being found down in bathroom by daughter.  No LOC reported however pt has advanced dementia and is a poor historian.   Hospital course has been uneventful. Syncope and sepsis work up - were negative - Blood culture on admission grew - Strep - possible contaminant - repeat blood cultures - negative.  CTH w/ moderate chronic microvascular changes but no acute transcortical infarction or hemorrhage. Negative for fractures on CT neck and XRays.  Carotid duplex negative.  TTE - same as prior ECHO, EF 35-40%. PT/OT consulted to assess functional status - [facility] Vs home with 24/7 assist. PPM interrogated - shows high atrial pacing threshold.  Would continue current programming.  No arrhythmia or pacemaker malfunction related to fall at home.  Clinically pt appears well, has dementia. TSH elevated, could be altered in acute setting, recommend repeat TFTs in 4-6 weeks.  Needs placement. Patient stable for discharge to [facility] today.           89 y/o M w/ PMH of CAD s/p stent, PPM, dementia, hypothyroidism was BIBA from home after being found down in bathroom by daughter.  No LOC reported however pt has advanced dementia and is a poor historian.   Hospital course has been uneventful. Syncope and sepsis work up - were negative - Blood culture on admission grew - Strep - possible contaminant - repeat blood cultures - negative.  CTH w/ moderate chronic microvascular changes but no acute transcortical infarction or hemorrhage. Negative for fractures on CT neck and XRays.  Carotid duplex negative.  TTE - same as prior ECHO, EF 35-40%. PT/OT consulted to assess functional status - [facility] Vs home with 24/7 assist. PPM interrogated - shows high atrial pacing threshold.  Would continue current programming.  No arrhythmia or pacemaker malfunction related to fall at home.  Clinically pt appears well, has dementia. TSH elevated, could be altered in acute setting, recommend repeat TFTs in 4-6 weeks.  Needs placement. Patient stable for discharge to [facility] today.      Time spent in discharge planning and co-ordination : 39min

## 2021-04-24 NOTE — PROGRESS NOTE ADULT - ASSESSMENT
89 y/o M w/ PMH of CAD s/p stent, PPM, dementia, hypothyroidism was BIBA from home after being found down in bathroom by daughter.  No LOC reported however pt has advanced dementia and is a poor historian.   Hospital course has been uneventful.        Unwitnessed likely mechanical fall vs less likely syncope  - CTH w/ moderate chronic microvascular changes but no acute transcortical infarction or hemorrhage  - Negative for fractures on CT neck and XRays  - Carotid duplex negative   - TTE - same as prior ECHO  - PT/OT consulted to assess functional status - [facility] Vs home with 24/7 assist  - PPM interrogation pending  - Cardiology recs       Hypothermia w/ mild Leukocytosis on admission  - Pt nontoxic appearing and normothermic.  Have low suspicion for infection  - Leukocytosis improving and likely reactive   - No pyuria on UA and no infiltrate/consolidatoin on CXR  - LA normal and cultures NTD  - Recieved rocephin/azithro in ED on arrival  - Will continue to monitor off antibiotics given low suspicion for infection at this time  - Monitor CBC daily to trend WBC and trend temperature curve      Subclinical hypothyroid   - TFTs could be altered in acute setting.  Repeat in 4-6 weeks   - If TSH >10 on repeat studies consider starting on low dose synthroid.       CAD s/p stents  - c/w home meds      Chronic HFrEF  -EF 35-40%  - Euvolemic at this time  - c/w home medications      Dementia  - c/w supportive care      DVT ppx: Lovenox    Dispo:  Pt medically stable at this time and anticipate possible d/c in 1-2 days.  Pt may benefit from [facility] vs 24hr home care per discussion w/ PT.      Updated the family - Daughter over phone.

## 2021-04-25 LAB
ANION GAP SERPL CALC-SCNC: 13 MMOL/L — SIGNIFICANT CHANGE UP (ref 5–17)
BUN SERPL-MCNC: 26 MG/DL — HIGH (ref 8–20)
CALCIUM SERPL-MCNC: 8.5 MG/DL — LOW (ref 8.6–10.2)
CHLORIDE SERPL-SCNC: 101 MMOL/L — SIGNIFICANT CHANGE UP (ref 98–107)
CO2 SERPL-SCNC: 23 MMOL/L — SIGNIFICANT CHANGE UP (ref 22–29)
CREAT SERPL-MCNC: 0.99 MG/DL — SIGNIFICANT CHANGE UP (ref 0.5–1.3)
GLUCOSE SERPL-MCNC: 81 MG/DL — SIGNIFICANT CHANGE UP (ref 70–99)
HCT VFR BLD CALC: 41.4 % — SIGNIFICANT CHANGE UP (ref 39–50)
HGB BLD-MCNC: 14.1 G/DL — SIGNIFICANT CHANGE UP (ref 13–17)
MAGNESIUM SERPL-MCNC: 1.9 MG/DL — SIGNIFICANT CHANGE UP (ref 1.8–2.6)
MCHC RBC-ENTMCNC: 30.9 PG — SIGNIFICANT CHANGE UP (ref 27–34)
MCHC RBC-ENTMCNC: 34.1 GM/DL — SIGNIFICANT CHANGE UP (ref 32–36)
MCV RBC AUTO: 90.6 FL — SIGNIFICANT CHANGE UP (ref 80–100)
PLATELET # BLD AUTO: 134 K/UL — LOW (ref 150–400)
POTASSIUM SERPL-MCNC: 4.1 MMOL/L — SIGNIFICANT CHANGE UP (ref 3.5–5.3)
POTASSIUM SERPL-SCNC: 4.1 MMOL/L — SIGNIFICANT CHANGE UP (ref 3.5–5.3)
RBC # BLD: 4.57 M/UL — SIGNIFICANT CHANGE UP (ref 4.2–5.8)
RBC # FLD: 12.9 % — SIGNIFICANT CHANGE UP (ref 10.3–14.5)
SODIUM SERPL-SCNC: 137 MMOL/L — SIGNIFICANT CHANGE UP (ref 135–145)
WBC # BLD: 10.57 K/UL — HIGH (ref 3.8–10.5)
WBC # FLD AUTO: 10.57 K/UL — HIGH (ref 3.8–10.5)

## 2021-04-25 PROCEDURE — 99233 SBSQ HOSP IP/OBS HIGH 50: CPT

## 2021-04-25 RX ADMIN — TAMSULOSIN HYDROCHLORIDE 0.8 MILLIGRAM(S): 0.4 CAPSULE ORAL at 21:39

## 2021-04-25 RX ADMIN — ENOXAPARIN SODIUM 40 MILLIGRAM(S): 100 INJECTION SUBCUTANEOUS at 11:57

## 2021-04-25 RX ADMIN — SIMVASTATIN 20 MILLIGRAM(S): 20 TABLET, FILM COATED ORAL at 21:37

## 2021-04-25 RX ADMIN — DONEPEZIL HYDROCHLORIDE 10 MILLIGRAM(S): 10 TABLET, FILM COATED ORAL at 21:37

## 2021-04-25 RX ADMIN — CARVEDILOL PHOSPHATE 3.12 MILLIGRAM(S): 80 CAPSULE, EXTENDED RELEASE ORAL at 17:15

## 2021-04-25 RX ADMIN — CARVEDILOL PHOSPHATE 3.12 MILLIGRAM(S): 80 CAPSULE, EXTENDED RELEASE ORAL at 06:07

## 2021-04-25 NOTE — PROGRESS NOTE ADULT - ASSESSMENT
89 y/o M w/ PMH of CAD s/p stent, PPM, dementia, hypothyroidism was BIBA from home after being found down in bathroom by daughter.  No LOC reported however pt has advanced dementia and is a poor historian.   Hospital course has been uneventful.  cardiology was consulted - pacemaker was interrogated, Syncope and sepsis work up - was negative - Blood culture on admission grew - Strep - possible contaminant - repeat blood cultures - pending. Clinically pt appears well, has dementia. May need placement.       Unwitnessed likely mechanical fall vs less likely syncope  - CTH w/ moderate chronic microvascular changes but no acute transcortical infarction or hemorrhage  - Negative for fractures on CT neck and XRays  - Carotid duplex negative   - TTE - same as prior ECHO  - PT/OT consulted to assess functional status - [facility] Vs home with 24/7 assist  - PPM interrogated - shows high atrial pacing threshold.  Would continue current programming.  No arrhythmia or pacemaker malfunction related to fall at home  - Cardiology recs appreciated       Hypothermia w/ mild Leukocytosis on admission  - Pt nontoxic appearing and normothermic.  Have low suspicion for infection  - Leukocytosis improving and likely reactive   - No pyuria on UA and no infiltrate/consolidatoin on CXR  - blood cultures 1/2 - Strep - repeat pending   - Recieved rocephin/azithro in ED on arrival  - Will continue to monitor off antibiotics given low suspicion for infection at this time        Subclinical hypothyroid   - TFTs could be altered in acute setting.  Repeat in 4-6 weeks   - If TSH >10 on repeat studies consider starting on low dose synthroid.       CAD s/p stents  - c/w home meds      Chronic HFrEF  -EF 35-40%  - Euvolemic at this time  - c/w home medications      Dementia  - c/w supportive care      DVT ppx: Lovenox    Dispo:  Pt medically stable at this time and anticipate possible d/c in 1-2 days    Updated the family - Daughter over phone. - may need placement - SW consult placed

## 2021-04-25 NOTE — PROGRESS NOTE ADULT - SUBJECTIVE AND OBJECTIVE BOX
[facility], THE HEART CENTER                                   [address] [place] [address]                                                      PHONE: [phone]                                                         FAX: [phone]  [email or web address]  ---------------------------------------------------------------------------------------------------------------------------------    Overnight events/patient complaints: Feels fine, no complaints.  Tele- V paced.  Pacer was checked.  No VT or tachyarrhythmia.  Atrial pacing threshold has been chronically elevated and device has been chronically programmed VVI. Underlying rhythm is sinus arun 40s/min.      No Known Allergies    MEDICATIONS  (STANDING):  carvedilol 3.125 milliGRAM(s) Oral every 12 hours  donepezil 10 milliGRAM(s) Oral at bedtime  enoxaparin Injectable 40 milliGRAM(s) SubCutaneous daily  simvastatin 20 milliGRAM(s) Oral at bedtime  tamsulosin 0.8 milliGRAM(s) Oral at bedtime    MEDICATIONS  (PRN):  acetaminophen   Tablet .. 650 milliGRAM(s) Oral every 4 hours PRN Mild Pain (1 - 3)  calcium carbonate    500 mG (Tums) Chewable 3 Tablet(s) Chew every 6 hours PRN Dyspepsia  QUEtiapine 50 milliGRAM(s) Oral at bedtime PRN aggitation  senna 2 Tablet(s) Oral at bedtime PRN Constipation      Vital Signs Last 24 Hrs  T(C): 36.4 (25 Apr 2021 06:10), Max: 36.8 (24 Apr 2021 17:09)  T(F): 97.5 (25 Apr 2021 06:10), Max: 98.2 (24 Apr 2021 17:09)  HR: 61 (25 Apr 2021 06:10) (61 - 91)  BP: 107/67 (25 Apr 2021 06:10) (103/71 - 118/80)  BP(mean): --  RR: 18 (25 Apr 2021 06:10) (18 - 18)  SpO2: 95% (25 Apr 2021 06:10) (94% - 96%)  ICU Vital Signs Last 24 Hrs  MARISA DIOR  I&O's Detail    I&O's Summary    Drug Dosing Weight  MARISA DIOR      PHYSICAL EXAM:  General: Appears well developed, well nourished alert and cooperative.  HEENT: Head; normocephalic, atraumatic.  Eyes: Pupils reactive, cornea wnl.  Neck: Supple, no nodes adenopathy, no NVD or carotid bruit or thyromegaly.  CARDIOVASCULAR: Normal S1 and S2, No murmur, rub, gallop or lift.   LUNGS: No rales, rhonchi or wheeze. Normal breath sounds bilaterally.  ABDOMEN: Soft, nontender without mass or organomegaly. bowel sounds normoactive.  EXTREMITIES: No clubbing, cyanosis or edema. Distal pulses wnl.   SKIN: warm and dry with normal turgor.  NEURO: Alert/oriented x 3/normal motor exam. No pathologic reflexes.    PSYCH: normal affect.        LABS:                        14.1   10.57 )-----------( 134      ( 25 Apr 2021 09:10 )             41.4     04-25    137  |  101  |  26.0<H>  ----------------------------<  81  4.1   |  23.0  |  0.99    Ca    8.5<L>      25 Apr 2021 09:10  Mg     1.9     04-25      MARISA DIOR      RADIOLOGY & ADDITIONAL STUDIES:      ASSESSMENT AND PLAN:    s/p fall  pacer interrogation shows high atrial pacing threshold.  Would continue current programming.  No arrhythmia or pacemaker malfunction related to fall at home  Positive blood culture- unclear if contaminant- further workup in process  ?discharge planning

## 2021-04-25 NOTE — PROGRESS NOTE ADULT - SUBJECTIVE AND OBJECTIVE BOX
MARISA DIOR    [id number]    89y      Male    CC: fall - Does not recollect the situation how he fell   feels well currently  no complaints   does not remember if he lives alone or with daughter      INTERVAL HPI/OVERNIGHT EVENTS: no acute events     REVIEW OF SYSTEMS:    CONSTITUTIONAL: No fever, fatigue  RESPIRATORY: No cough, wheezing, No shortness of breath  CARDIOVASCULAR: No chest pain, palpitations  GASTROINTESTINAL: No abdominal or epigastric pain. No nausea, vomiting  NEUROLOGICAL: No headaches        Vital Signs Last 24 Hrs  T(C): 36.4 (25 Apr 2021 06:10), Max: 36.8 (24 Apr 2021 17:09)  T(F): 97.5 (25 Apr 2021 06:10), Max: 98.2 (24 Apr 2021 17:09)  HR: 61 (25 Apr 2021 06:10) (61 - 91)  BP: 107/67 (25 Apr 2021 06:10) (103/71 - 118/80)  BP(mean): --  RR: 18 (25 Apr 2021 06:10) (18 - 18)  SpO2: 95% (25 Apr 2021 06:10) (94% - 96%)    PHYSICAL EXAM:    GENERAL: NAD, well-groomed  HEENT: PERRL, +EOMI  NECK: soft, Supple, No JVD,   CHEST/LUNG: Clear to percussion bilaterally; No wheezing  HEART: S1S2+, Regular rate and rhythm; No murmurs  ABDOMEN: Soft, Nontender, Nondistended; Bowel sounds present  EXTREMITIES:  No clubbing, cyanosis, or edema  SKIN: No rashes or lesions  NEURO: awake, alert - forgertful        LABS:                                     14.1   10.57 )-----------( 134      ( 25 Apr 2021 09:10 )             41.4   04-25    137  |  101  |  26.0<H>  ----------------------------<  81  4.1   |  23.0  |  0.99    Ca    8.5<L>      25 Apr 2021 09:10  Mg     1.9     04-25          MEDICATIONS  (STANDING):  carvedilol 3.125 milliGRAM(s) Oral every 12 hours  donepezil 10 milliGRAM(s) Oral at bedtime  enoxaparin Injectable 40 milliGRAM(s) SubCutaneous daily  simvastatin 20 milliGRAM(s) Oral at bedtime  tamsulosin 0.8 milliGRAM(s) Oral at bedtime    MEDICATIONS  (PRN):  acetaminophen   Tablet .. 650 milliGRAM(s) Oral every 4 hours PRN Mild Pain (1 - 3)  calcium carbonate    500 mG (Tums) Chewable 3 Tablet(s) Chew every 6 hours PRN Dyspepsia  QUEtiapine 50 milliGRAM(s) Oral at bedtime PRN aggitation  senna 2 Tablet(s) Oral at bedtime PRN Constipation      RADIOLOGY & ADDITIONAL TESTS:

## 2021-04-26 ENCOUNTER — TRANSCRIPTION ENCOUNTER (OUTPATIENT)
Age: 86
End: 2021-04-26

## 2021-04-26 VITALS
HEART RATE: 63 BPM | DIASTOLIC BLOOD PRESSURE: 78 MMHG | TEMPERATURE: 98 F | RESPIRATION RATE: 18 BRPM | SYSTOLIC BLOOD PRESSURE: 116 MMHG | OXYGEN SATURATION: 94 %

## 2021-04-26 LAB
CULTURE RESULTS: SIGNIFICANT CHANGE UP
SPECIMEN SOURCE: SIGNIFICANT CHANGE UP

## 2021-04-26 PROCEDURE — 87040 BLOOD CULTURE FOR BACTERIA: CPT

## 2021-04-26 PROCEDURE — 97163 PT EVAL HIGH COMPLEX 45 MIN: CPT

## 2021-04-26 PROCEDURE — 87150 DNA/RNA AMPLIFIED PROBE: CPT

## 2021-04-26 PROCEDURE — 87086 URINE CULTURE/COLONY COUNT: CPT

## 2021-04-26 PROCEDURE — 80053 COMPREHEN METABOLIC PANEL: CPT

## 2021-04-26 PROCEDURE — 83735 ASSAY OF MAGNESIUM: CPT

## 2021-04-26 PROCEDURE — 93005 ELECTROCARDIOGRAM TRACING: CPT

## 2021-04-26 PROCEDURE — 84443 ASSAY THYROID STIM HORMONE: CPT

## 2021-04-26 PROCEDURE — 72125 CT NECK SPINE W/O DYE: CPT

## 2021-04-26 PROCEDURE — 71045 X-RAY EXAM CHEST 1 VIEW: CPT

## 2021-04-26 PROCEDURE — 84145 PROCALCITONIN (PCT): CPT

## 2021-04-26 PROCEDURE — 86769 SARS-COV-2 COVID-19 ANTIBODY: CPT

## 2021-04-26 PROCEDURE — U0003: CPT

## 2021-04-26 PROCEDURE — 72170 X-RAY EXAM OF PELVIS: CPT

## 2021-04-26 PROCEDURE — 84439 ASSAY OF FREE THYROXINE: CPT

## 2021-04-26 PROCEDURE — 85610 PROTHROMBIN TIME: CPT

## 2021-04-26 PROCEDURE — 80048 BASIC METABOLIC PNL TOTAL CA: CPT

## 2021-04-26 PROCEDURE — 93880 EXTRACRANIAL BILAT STUDY: CPT

## 2021-04-26 PROCEDURE — 70450 CT HEAD/BRAIN W/O DYE: CPT

## 2021-04-26 PROCEDURE — 99285 EMERGENCY DEPT VISIT HI MDM: CPT | Mod: 25

## 2021-04-26 PROCEDURE — 85027 COMPLETE CBC AUTOMATED: CPT

## 2021-04-26 PROCEDURE — 81001 URINALYSIS AUTO W/SCOPE: CPT

## 2021-04-26 PROCEDURE — C8929: CPT

## 2021-04-26 PROCEDURE — 87077 CULTURE AEROBIC IDENTIFY: CPT

## 2021-04-26 PROCEDURE — 99239 HOSP IP/OBS DSCHRG MGMT >30: CPT

## 2021-04-26 PROCEDURE — 84436 ASSAY OF TOTAL THYROXINE: CPT

## 2021-04-26 PROCEDURE — 85025 COMPLETE CBC W/AUTO DIFF WBC: CPT

## 2021-04-26 PROCEDURE — 83605 ASSAY OF LACTIC ACID: CPT

## 2021-04-26 PROCEDURE — U0005: CPT

## 2021-04-26 PROCEDURE — 97166 OT EVAL MOD COMPLEX 45 MIN: CPT

## 2021-04-26 PROCEDURE — 36415 COLL VENOUS BLD VENIPUNCTURE: CPT

## 2021-04-26 PROCEDURE — 84480 ASSAY TRIIODOTHYRONINE (T3): CPT

## 2021-04-26 PROCEDURE — 85730 THROMBOPLASTIN TIME PARTIAL: CPT

## 2021-04-26 RX ORDER — SENNA PLUS 8.6 MG/1
2 TABLET ORAL
Qty: 0 | Refills: 0 | DISCHARGE
Start: 2021-04-26

## 2021-04-26 RX ADMIN — CARVEDILOL PHOSPHATE 3.12 MILLIGRAM(S): 80 CAPSULE, EXTENDED RELEASE ORAL at 05:40

## 2021-04-26 RX ADMIN — ENOXAPARIN SODIUM 40 MILLIGRAM(S): 100 INJECTION SUBCUTANEOUS at 11:23

## 2021-04-26 NOTE — PROGRESS NOTE ADULT - SUBJECTIVE AND OBJECTIVE BOX
CC: Fall/hypothermia (26 Apr 2021 11:51)    HPI:  89 y/o Male with PMH of CAD s/p stent, PPM, dementia, hypothyroidism who was found down in the bathroom by his daughter. Patient is a poor historian and only oriented to self.   In the ED patient underwent CT head and neck which showed no fracture or bleed. He was noted to be hypothermic. He was given azithromycin and ceftriaxone. Blood cultures and urine samples were sent.    INTERVAL HPI/OVERNIGHT EVENTS: Patient seen and examined sitting up in bed. Patient denies any complaints.  ROS limited secondary to confusion.    Vital Signs Last 24 Hrs  T(C): 36.9 (26 Apr 2021 10:39), Max: 36.9 (26 Apr 2021 10:39)  T(F): 98.5 (26 Apr 2021 10:39), Max: 98.5 (26 Apr 2021 10:39)  HR: 63 (26 Apr 2021 10:39) (61 - 85)  BP: 116/78 (26 Apr 2021 10:39) (111/71 - 128/71)  BP(mean): --  RR: 18 (26 Apr 2021 10:39) (18 - 18)  SpO2: 94% (26 Apr 2021 10:39) (94% - 98%)  I&O's Detail    26 Apr 2021 07:01  -  26 Apr 2021 13:11  --------------------------------------------------------  IN:  Total IN: 0 mL    OUT:    Voided (mL): 400 mL  Total OUT: 400 mL    Total NET: -400 mL        PHYSICAL EXAM:  GENERAL: NAD  HEAD:  Atraumatic, Normocephalic  NECK: Supple, No JVD, Normal thyroid  NERVOUS SYSTEM:  Alert & Oriented X1, Good concentration; generalized weakness  CHEST/LUNG: Clear to auscultation bilaterally  HEART: Regular rate and rhythm; No murmurs, rubs, or gallops  ABDOMEN: Soft, Nontender, Nondistended; Bowel sounds present  EXTREMITIES:  2+ Peripheral Pulses, No clubbing, cyanosis, or edema                            14.1   10.57 )-----------( 134      ( 25 Apr 2021 09:10 )             41.4     25 Apr 2021 09:10    137    |  101    |  26.0   ----------------------------<  81     4.1     |  23.0   |  0.99     Ca    8.5        25 Apr 2021 09:10  Mg     1.9       25 Apr 2021 09:10        MEDICATIONS  (STANDING):  carvedilol 3.125 milliGRAM(s) Oral every 12 hours  donepezil 10 milliGRAM(s) Oral at bedtime  enoxaparin Injectable 40 milliGRAM(s) SubCutaneous daily  simvastatin 20 milliGRAM(s) Oral at bedtime  tamsulosin 0.8 milliGRAM(s) Oral at bedtime    MEDICATIONS  (PRN):  acetaminophen   Tablet .. 650 milliGRAM(s) Oral every 4 hours PRN Mild Pain (1 - 3)  calcium carbonate    500 mG (Tums) Chewable 3 Tablet(s) Chew every 6 hours PRN Dyspepsia  QUEtiapine 50 milliGRAM(s) Oral at bedtime PRN aggitation  senna 2 Tablet(s) Oral at bedtime PRN Constipation

## 2021-04-26 NOTE — DISCHARGE NOTE NURSING/CASE MANAGEMENT/SOCIAL WORK - PATIENT PORTAL LINK FT
You can access the FollowMyHealth Patient Portal offered by [facility] by registering at the following website: [email or web address]. By joining [employer]’s FollowMyHealth portal, you will also be able to view your health information using other applications (apps) compatible with our system.

## 2021-04-26 NOTE — PROGRESS NOTE ADULT - ATTENDING COMMENTS
pt seen and examined at bedside  Doing well. offers no complaints, Forgetful and confused at baseline.   Tolerating PO  Cleared by cardio for DC   KEV when bed arranged.

## 2021-04-26 NOTE — PROGRESS NOTE ADULT - SUBJECTIVE AND OBJECTIVE BOX
[facility], THE HEART CENTER                                   [address] [place] [address]                                                      PHONE: [phone]                                                         FAX: [phone]  [email or web address]  ---------------------------------------------------------------------------------------------------------------------------------    Overnight events/patient complaints:  Patient is feeling well.  No chest pain or dyspnea.     PAST MEDICAL & SURGICAL HISTORY:  Reflux    Anginal pain    Coronary artery disease    Enlarged prostate    Heart attack    TIA (transient ischemic attack)    Cataract    Stented coronary artery  x1    BPH (benign prostatic hypertrophy)    Hypothyroidism    Dementia    Alcohol abuse    S/P appendectomy    H/O: hemorrhoidectomy    H/O pilonidal cyst    Coronary artery disease  cardiac stents 2011    H/O cataract removal with insertion of prosthetic lens    Elective surgery  pt had bladder mass removed apporoximayly 6 weeks ago by Dr. Mccord        No Known Allergies    MEDICATIONS  (STANDING):  carvedilol 3.125 milliGRAM(s) Oral every 12 hours  donepezil 10 milliGRAM(s) Oral at bedtime  enoxaparin Injectable 40 milliGRAM(s) SubCutaneous daily  simvastatin 20 milliGRAM(s) Oral at bedtime  tamsulosin 0.8 milliGRAM(s) Oral at bedtime    MEDICATIONS  (PRN):  acetaminophen   Tablet .. 650 milliGRAM(s) Oral every 4 hours PRN Mild Pain (1 - 3)  calcium carbonate    500 mG (Tums) Chewable 3 Tablet(s) Chew every 6 hours PRN Dyspepsia  QUEtiapine 50 milliGRAM(s) Oral at bedtime PRN aggitation  senna 2 Tablet(s) Oral at bedtime PRN Constipation      Vital Signs Last 24 Hrs  T(C): 36.9 (26 Apr 2021 10:39), Max: 36.9 (26 Apr 2021 10:39)  T(F): 98.5 (26 Apr 2021 10:39), Max: 98.5 (26 Apr 2021 10:39)  HR: 63 (26 Apr 2021 10:39) (61 - 85)  BP: 116/78 (26 Apr 2021 10:39) (111/71 - 128/71)  BP(mean): --  RR: 18 (26 Apr 2021 10:39) (18 - 18)  SpO2: 94% (26 Apr 2021 10:39) (94% - 98%)  ICU Vital Signs Last 24 Hrs  MARISA BARBY  I&O's Detail    26 Apr 2021 07:01  -  26 Apr 2021 11:52  --------------------------------------------------------  IN:  Total IN: 0 mL    OUT:    Voided (mL): 400 mL  Total OUT: 400 mL    Total NET: -400 mL        I&O's Summary    26 Apr 2021 07:01  -  26 Apr 2021 11:52  --------------------------------------------------------  IN: 0 mL / OUT: 400 mL / NET: -400 mL      Drug Dosing Weight  MARISA DIOR      PHYSICAL EXAM:  General: Appears well developed, well nourished alert and cooperative.  HEENT: Head; normocephalic, atraumatic.  Eyes: Pupils reactive, cornea wnl.  Neck: Supple, no nodes adenopathy, no NVD or carotid bruit or thyromegaly.  CARDIOVASCULAR: Normal S1 and S2, No murmur, rub, gallop or lift.   LUNGS: No rales, rhonchi or wheeze. Normal breath sounds bilaterally.  ABDOMEN: Soft, nontender without mass or organomegaly. bowel sounds normoactive.  EXTREMITIES: No clubbing, cyanosis or edema. Distal pulses wnl.   SKIN: warm and dry with normal turgor.  NEURO: Alert/oriented x 3/normal motor exam.   PSYCH: normal affect.    LABS:                        14.1   10.57 )-----------( 134      ( 25 Apr 2021 09:10 )             41.4     04-25    137  |  101  |  26.0<H>  ----------------------------<  81  4.1   |  23.0  |  0.99    Ca    8.5<L>      25 Apr 2021 09:10  Mg     1.9     04-25      MARISA DIOR            RADIOLOGY & ADDITIONAL STUDIES:    INTERPRETATION OF TELEMETRY (personally reviewed):    ECG:    ECHO:    STRESS TEST:    CARDIAC CATHETERIZATION:    ASSESSMENT AND PLAN:  ASSESSMENT AND PLAN:  In summary, MARISA DIOR is an 89y Male with past medical history significant for pAF (not on AC), demential, CAD s/p remote PCI, PPM p/w fall v. syncope.    -tele normal  -s/p fall with normal PPM interrogation      Thank you for allowing [facility] to participate in the care of this patient.  Please feel free to call us back with any questions or concerns.

## 2021-04-26 NOTE — PROGRESS NOTE ADULT - ASSESSMENT
89 y/o M w/ PMH of CAD s/p stent, PPM, dementia, hypothyroidism was BIBA from home after being found down in bathroom by daughter.  No LOC reported however pt has advanced dementia and is a poor historian.   Hospital course has been uneventful.  cardiology was consulted - pacemaker was interrogated, Syncope and sepsis work up - was negative - Blood culture on admission grew - Strep - possible contaminant - repeat blood cultures - pending. Clinically pt appears well, has dementia. Needs placement.       Unwitnessed likely mechanical fall vs less likely syncope  - CTH w/ moderate chronic microvascular changes but no acute transcortical infarction or hemorrhage  - Negative for fractures on CT neck and XRays  - Carotid duplex negative   - TTE - same as prior ECHO  - PT/OT consulted to assess functional status - K[facility] Vs home with 24/7 assist  - PPM interrogated - shows high atrial pacing threshold.  Would continue current programming.  No arrhythmia or pacemaker malfunction related to fall at home  - Cardiology recs appreciated       Hypothermia w/ mild Leukocytosis on admission  - Pt nontoxic appearing and normothermic.  Have low suspicion for infection  - Leukocytosis improving and likely reactive   - No pyuria on UA and no infiltrate/consolidatoin on CXR  - blood cultures 1/2 - Strep - repeat blood cx negative   - Recieved rocephin/azithro in ED on arrival  - Will continue to monitor off antibiotics given low suspicion for infection at this time        Subclinical hypothyroid   - TFTs could be altered in acute setting.  Repeat in 4-6 weeks   - If TSH >10 on repeat studies consider starting on low dose synthroid.       CAD s/p stents  - c/w home meds      Chronic HFrEF  -EF 35-40%  - Euvolemic at this time  - c/w home medications      Dementia  - c/w supportive care      DVT ppx: Lovenox    Dispo:  Pt medically stable at this time, awaiting insurance auth for KEV    Updated the family - Daughter over phone 4/25/21

## 2021-04-26 NOTE — PROGRESS NOTE ADULT - PROVIDER SPECIALTY LIST ADULT
Hospitalist
Cardiology
Hospitalist

## 2021-04-26 NOTE — PROGRESS NOTE ADULT - REASON FOR ADMISSION
Fall/hypothermia

## 2021-04-28 LAB
CULTURE RESULTS: SIGNIFICANT CHANGE UP
SPECIMEN SOURCE: SIGNIFICANT CHANGE UP

## 2021-05-05 ENCOUNTER — APPOINTMENT (OUTPATIENT)
Dept: DISASTER EMERGENCY | Facility: OTHER | Age: 86
End: 2021-05-05

## 2021-07-30 ENCOUNTER — INPATIENT (INPATIENT)
Facility: HOSPITAL | Age: 86
LOS: 5 days | Discharge: ANOTHER HOSPITAL | DRG: 593 | End: 2021-08-05
Attending: STUDENT IN AN ORGANIZED HEALTH CARE EDUCATION/TRAINING PROGRAM | Admitting: FAMILY MEDICINE
Payer: MEDICARE

## 2021-07-30 VITALS
WEIGHT: 177.91 LBS | TEMPERATURE: 98 F | DIASTOLIC BLOOD PRESSURE: 59 MMHG | SYSTOLIC BLOOD PRESSURE: 89 MMHG | HEIGHT: 76 IN | RESPIRATION RATE: 20 BRPM | OXYGEN SATURATION: 90 % | HEART RATE: 76 BPM

## 2021-07-30 DIAGNOSIS — I25.9 CHRONIC ISCHEMIC HEART DISEASE, UNSPECIFIED: Chronic | ICD-10-CM

## 2021-07-30 DIAGNOSIS — Z98.89 OTHER SPECIFIED POSTPROCEDURAL STATES: Chronic | ICD-10-CM

## 2021-07-30 DIAGNOSIS — D72.829 ELEVATED WHITE BLOOD CELL COUNT, UNSPECIFIED: ICD-10-CM

## 2021-07-30 DIAGNOSIS — Z87.2 PERSONAL HISTORY OF DISEASES OF THE SKIN AND SUBCUTANEOUS TISSUE: Chronic | ICD-10-CM

## 2021-07-30 DIAGNOSIS — Z41.9 ENCOUNTER FOR PROCEDURE FOR PURPOSES OTHER THAN REMEDYING HEALTH STATE, UNSPECIFIED: Chronic | ICD-10-CM

## 2021-07-30 LAB
ALBUMIN SERPL ELPH-MCNC: 2.7 G/DL — LOW (ref 3.3–5.2)
ALP SERPL-CCNC: 92 U/L — SIGNIFICANT CHANGE UP (ref 40–120)
ALT FLD-CCNC: 9 U/L — SIGNIFICANT CHANGE UP
ANION GAP SERPL CALC-SCNC: 15 MMOL/L — SIGNIFICANT CHANGE UP (ref 5–17)
APPEARANCE UR: CLEAR — SIGNIFICANT CHANGE UP
AST SERPL-CCNC: 53 U/L — HIGH
BASOPHILS # BLD AUTO: 0.05 K/UL — SIGNIFICANT CHANGE UP (ref 0–0.2)
BASOPHILS NFR BLD AUTO: 0.2 % — SIGNIFICANT CHANGE UP (ref 0–2)
BILIRUB SERPL-MCNC: 0.7 MG/DL — SIGNIFICANT CHANGE UP (ref 0.4–2)
BILIRUB UR-MCNC: NEGATIVE — SIGNIFICANT CHANGE UP
BUN SERPL-MCNC: 74 MG/DL — HIGH (ref 8–20)
CALCIUM SERPL-MCNC: 9 MG/DL — SIGNIFICANT CHANGE UP (ref 8.6–10.2)
CHLORIDE SERPL-SCNC: 106 MMOL/L — SIGNIFICANT CHANGE UP (ref 98–107)
CO2 SERPL-SCNC: 17 MMOL/L — LOW (ref 22–29)
COLOR SPEC: YELLOW — SIGNIFICANT CHANGE UP
CREAT SERPL-MCNC: 1.88 MG/DL — HIGH (ref 0.5–1.3)
DIFF PNL FLD: ABNORMAL
EOSINOPHIL # BLD AUTO: 0.03 K/UL — SIGNIFICANT CHANGE UP (ref 0–0.5)
EOSINOPHIL NFR BLD AUTO: 0.1 % — SIGNIFICANT CHANGE UP (ref 0–6)
GLUCOSE SERPL-MCNC: 129 MG/DL — HIGH (ref 70–99)
GLUCOSE UR QL: NEGATIVE MG/DL — SIGNIFICANT CHANGE UP
HCT VFR BLD CALC: 52 % — HIGH (ref 39–50)
HGB BLD-MCNC: 16.8 G/DL — SIGNIFICANT CHANGE UP (ref 13–17)
IMM GRANULOCYTES NFR BLD AUTO: 0.9 % — SIGNIFICANT CHANGE UP (ref 0–1.5)
KETONES UR-MCNC: NEGATIVE — SIGNIFICANT CHANGE UP
LEUKOCYTE ESTERASE UR-ACNC: NEGATIVE — SIGNIFICANT CHANGE UP
LYMPHOCYTES # BLD AUTO: 0.81 K/UL — LOW (ref 1–3.3)
LYMPHOCYTES # BLD AUTO: 3.9 % — LOW (ref 13–44)
MANUAL SMEAR VERIFICATION: SIGNIFICANT CHANGE UP
MCHC RBC-ENTMCNC: 29.9 PG — SIGNIFICANT CHANGE UP (ref 27–34)
MCHC RBC-ENTMCNC: 32.3 GM/DL — SIGNIFICANT CHANGE UP (ref 32–36)
MCV RBC AUTO: 92.7 FL — SIGNIFICANT CHANGE UP (ref 80–100)
MONOCYTES # BLD AUTO: 1.15 K/UL — HIGH (ref 0–0.9)
MONOCYTES NFR BLD AUTO: 5.5 % — SIGNIFICANT CHANGE UP (ref 2–14)
NEUTROPHILS # BLD AUTO: 18.71 K/UL — HIGH (ref 1.8–7.4)
NEUTROPHILS NFR BLD AUTO: 89.4 % — HIGH (ref 43–77)
NITRITE UR-MCNC: NEGATIVE — SIGNIFICANT CHANGE UP
PH UR: 5 — SIGNIFICANT CHANGE UP (ref 5–8)
PLAT MORPH BLD: NORMAL — SIGNIFICANT CHANGE UP
PLATELET # BLD AUTO: 103 K/UL — LOW (ref 150–400)
POTASSIUM SERPL-MCNC: 4.8 MMOL/L — SIGNIFICANT CHANGE UP (ref 3.5–5.3)
POTASSIUM SERPL-SCNC: 4.8 MMOL/L — SIGNIFICANT CHANGE UP (ref 3.5–5.3)
PROT SERPL-MCNC: 6.7 G/DL — SIGNIFICANT CHANGE UP (ref 6.6–8.7)
PROT UR-MCNC: 30 MG/DL
RBC # BLD: 5.61 M/UL — SIGNIFICANT CHANGE UP (ref 4.2–5.8)
RBC # FLD: 13.4 % — SIGNIFICANT CHANGE UP (ref 10.3–14.5)
RBC BLD AUTO: NORMAL — SIGNIFICANT CHANGE UP
RBC CASTS # UR COMP ASSIST: SIGNIFICANT CHANGE UP /HPF (ref 0–4)
SARS-COV-2 RNA SPEC QL NAA+PROBE: SIGNIFICANT CHANGE UP
SODIUM SERPL-SCNC: 138 MMOL/L — SIGNIFICANT CHANGE UP (ref 135–145)
SP GR SPEC: 1.02 — SIGNIFICANT CHANGE UP (ref 1.01–1.02)
UROBILINOGEN FLD QL: NEGATIVE MG/DL — SIGNIFICANT CHANGE UP
WBC # BLD: 20.94 K/UL — HIGH (ref 3.8–10.5)
WBC # FLD AUTO: 20.94 K/UL — HIGH (ref 3.8–10.5)
WBC UR QL: NEGATIVE — SIGNIFICANT CHANGE UP

## 2021-07-30 PROCEDURE — 99285 EMERGENCY DEPT VISIT HI MDM: CPT

## 2021-07-30 PROCEDURE — 71045 X-RAY EXAM CHEST 1 VIEW: CPT | Mod: 26

## 2021-07-30 PROCEDURE — 99223 1ST HOSP IP/OBS HIGH 75: CPT

## 2021-07-30 RX ORDER — SODIUM CHLORIDE 9 MG/ML
250 INJECTION INTRAMUSCULAR; INTRAVENOUS; SUBCUTANEOUS ONCE
Refills: 0 | Status: COMPLETED | OUTPATIENT
Start: 2021-07-30 | End: 2021-07-30

## 2021-07-30 RX ORDER — SIMVASTATIN 20 MG/1
20 TABLET, FILM COATED ORAL AT BEDTIME
Refills: 0 | Status: DISCONTINUED | OUTPATIENT
Start: 2021-07-30 | End: 2021-08-05

## 2021-07-30 RX ORDER — SODIUM CHLORIDE 9 MG/ML
1000 INJECTION INTRAMUSCULAR; INTRAVENOUS; SUBCUTANEOUS
Refills: 0 | Status: DISCONTINUED | OUTPATIENT
Start: 2021-07-30 | End: 2021-07-31

## 2021-07-30 RX ORDER — DONEPEZIL HYDROCHLORIDE 10 MG/1
10 TABLET, FILM COATED ORAL AT BEDTIME
Refills: 0 | Status: DISCONTINUED | OUTPATIENT
Start: 2021-07-30 | End: 2021-08-05

## 2021-07-30 RX ORDER — SODIUM CHLORIDE 9 MG/ML
1000 INJECTION INTRAMUSCULAR; INTRAVENOUS; SUBCUTANEOUS ONCE
Refills: 0 | Status: COMPLETED | OUTPATIENT
Start: 2021-07-30 | End: 2021-07-30

## 2021-07-30 RX ORDER — TAMSULOSIN HYDROCHLORIDE 0.4 MG/1
0.8 CAPSULE ORAL AT BEDTIME
Refills: 0 | Status: DISCONTINUED | OUTPATIENT
Start: 2021-07-30 | End: 2021-08-05

## 2021-07-30 RX ORDER — LACTOBACILLUS ACIDOPHILUS 100MM CELL
1 CAPSULE ORAL
Refills: 0 | Status: DISCONTINUED | OUTPATIENT
Start: 2021-07-30 | End: 2021-08-05

## 2021-07-30 RX ORDER — HALOPERIDOL DECANOATE 100 MG/ML
0.5 INJECTION INTRAMUSCULAR ONCE
Refills: 0 | Status: COMPLETED | OUTPATIENT
Start: 2021-07-30 | End: 2021-07-30

## 2021-07-30 RX ORDER — PIPERACILLIN AND TAZOBACTAM 4; .5 G/20ML; G/20ML
3.38 INJECTION, POWDER, LYOPHILIZED, FOR SOLUTION INTRAVENOUS ONCE
Refills: 0 | Status: COMPLETED | OUTPATIENT
Start: 2021-07-30 | End: 2021-07-30

## 2021-07-30 RX ORDER — SENNA PLUS 8.6 MG/1
2 TABLET ORAL AT BEDTIME
Refills: 0 | Status: DISCONTINUED | OUTPATIENT
Start: 2021-07-30 | End: 2021-08-05

## 2021-07-30 RX ORDER — BACITRACIN ZINC 500 UNIT/G
1 OINTMENT IN PACKET (EA) TOPICAL
Refills: 0 | Status: DISCONTINUED | OUTPATIENT
Start: 2021-07-30 | End: 2021-08-05

## 2021-07-30 RX ORDER — VANCOMYCIN HCL 1 G
1000 VIAL (EA) INTRAVENOUS ONCE
Refills: 0 | Status: COMPLETED | OUTPATIENT
Start: 2021-07-30 | End: 2021-07-30

## 2021-07-30 RX ORDER — PIPERACILLIN AND TAZOBACTAM 4; .5 G/20ML; G/20ML
3.38 INJECTION, POWDER, LYOPHILIZED, FOR SOLUTION INTRAVENOUS EVERY 12 HOURS
Refills: 0 | Status: DISCONTINUED | OUTPATIENT
Start: 2021-07-30 | End: 2021-08-04

## 2021-07-30 RX ORDER — HEPARIN SODIUM 5000 [USP'U]/ML
5000 INJECTION INTRAVENOUS; SUBCUTANEOUS EVERY 12 HOURS
Refills: 0 | Status: DISCONTINUED | OUTPATIENT
Start: 2021-07-30 | End: 2021-08-05

## 2021-07-30 RX ORDER — QUETIAPINE FUMARATE 200 MG/1
25 TABLET, FILM COATED ORAL AT BEDTIME
Refills: 0 | Status: DISCONTINUED | OUTPATIENT
Start: 2021-07-30 | End: 2021-08-05

## 2021-07-30 RX ADMIN — SODIUM CHLORIDE 250 MILLILITER(S): 9 INJECTION INTRAMUSCULAR; INTRAVENOUS; SUBCUTANEOUS at 22:57

## 2021-07-30 RX ADMIN — Medication 250 MILLIGRAM(S): at 19:53

## 2021-07-30 RX ADMIN — SODIUM CHLORIDE 1000 MILLILITER(S): 9 INJECTION INTRAMUSCULAR; INTRAVENOUS; SUBCUTANEOUS at 19:26

## 2021-07-30 RX ADMIN — SODIUM CHLORIDE 70 MILLILITER(S): 9 INJECTION INTRAMUSCULAR; INTRAVENOUS; SUBCUTANEOUS at 22:56

## 2021-07-30 RX ADMIN — PIPERACILLIN AND TAZOBACTAM 3.38 GRAM(S): 4; .5 INJECTION, POWDER, LYOPHILIZED, FOR SOLUTION INTRAVENOUS at 19:00

## 2021-07-30 RX ADMIN — HALOPERIDOL DECANOATE 0.5 MILLIGRAM(S): 100 INJECTION INTRAMUSCULAR at 22:56

## 2021-07-30 RX ADMIN — PIPERACILLIN AND TAZOBACTAM 200 GRAM(S): 4; .5 INJECTION, POWDER, LYOPHILIZED, FOR SOLUTION INTRAVENOUS at 18:35

## 2021-07-30 RX ADMIN — SODIUM CHLORIDE 1000 MILLILITER(S): 9 INJECTION INTRAMUSCULAR; INTRAVENOUS; SUBCUTANEOUS at 18:35

## 2021-07-30 NOTE — H&P ADULT - NSHPPHYSICALEXAM_GEN_ALL_CORE
Vital Signs Last 24 Hrs  T(C): 36.7 (30 Jul 2021 23:27), Max: 36.7 (30 Jul 2021 23:27)  T(F): 98 (30 Jul 2021 23:27), Max: 98 (30 Jul 2021 23:27)  HR: 66 (30 Jul 2021 23:27) (66 - 76)  BP: 102/54 (30 Jul 2021 23:27) (89/59 - 128/68)  BP(mean): --  RR: 18 (30 Jul 2021 23:27) (18 - 20)  SpO2: 100% (30 Jul 2021 23:27) (90% - 100%)    General: An elderly male lying in bed somewhat restless.   HEENT: AT, NC. PERRL. intact EOM. oral mucosa / lips appear dry.   Neck: supple. no JVD.   Chest: CTA bilaterally  Heart: S1,S2. RRR. no heart murmur. no edema.   Abdomen: soft. non-tender. non-distended. + BS.   Ext: no calf are swelling or pain appreciated. distal pulses intact.   Neuro: Alert, awake, baseline dementia, not giving any history, likely knows that he is in the hospital, no focal weakness., speech clear. moving all ext.  Skin: left buttock area with about 5 cm x 5cm skin tear with some serosanguinous discharge, skin tear appears fresh, no signs of infection or foul smell discharge ( per history, Daughter states that he often shifts himself in bed causing skin tear )  psych : somewhat restless no aggression towards staff at this point.

## 2021-07-30 NOTE — H&P ADULT - ASSESSMENT
pt. is a 89 yo male with hx of dementia , htn, bph, presents to the ED for right buttock wound. Patient disoriented at baseline. Daughter states that he often shifts himself in bed causing skin tear on left buttock that is now worse. Bleeds often. pt. does not give any history. no fever, chills, cough, sick contacts , n/v/d per history. Patient borderline hypotensive on arrival, no fever. pt's cbc showed wbc 20.98, no fever, no bandemia , no obvious source for elevated wbc.     - leukocytosis unspecified type, source ? follow all pending cultures, will keep on zosyn for now, ct chest ordered r/o pneumonia, aspiration ? will request swallow eval, ct chest pending to be followed.    - TRICIA, very likely pre renal and dehydration related, gentle iv fluids renal consult  avoid nephrotoxic meds, [facility] called.     - wound, left buttock area skin tear kind wound, will keep on bacitracin for now  to avoid infection, wound care consult called.     - dementia unspecified type with behavioral disturbance, seroquel prn, continue donepzil.     - htn, pt's bp soft, hold bp meds for now, iv fluids for now.     - thrombocytopenia, platelet 103, close f/u on cbc.     - dvt prophylaxis , heparin 5000 subacut bid.

## 2021-07-30 NOTE — ED ADULT NURSE NOTE - AS SC BRADEN NUTRITION
1st Attempt: LVM for Aubrey's parent to call back to schedule his routine follow up appointment with Dr Fisher.  Aubrey is due to be seen in June 2019.  I asked parents to call [phone] to schedule this appointment.     Rik Combs  Procedure , [place]  [facility]   (2) probably inadequate

## 2021-07-30 NOTE — ED PROVIDER NOTE - ATTENDING CONTRIBUTION TO CARE
Priyanka: I performed a face to face evaluation of this patient and performed a full history and physical examination on the patient.  I agree with the resident's history, physical examination, and plan of the patient unless otherwise noted. My brief assessment is as follows: pmh as documented, sent for wound to lower back, with low bp, poor historian, denies complaints. non toxic otherwise, with superficial wound to lower back excoreiated ulcer, somewhat weak appearing, ?lethargic per home health aid/ems. ctab, rrr, abd benign, neuro intact. elevated wbc, soft bp's, admitted for further treatment.

## 2021-07-30 NOTE — ED PROVIDER NOTE - OBJECTIVE STATEMENT
89 yo male with hx of dementia presents to the ED for right buttock wound. 89 yo male with hx of dementia presents to the ED for right buttock wound. Patient disoriented at baseline. Daughter states that he often shifts himself in bed causing skin tear on right buttock that is now worse. Bleeds often. Aide reports that patient is more lethargic than usual. Denies fever, chills, sick contacts. Patient borderline hypotensive on arrival.

## 2021-07-30 NOTE — H&P ADULT - HISTORY OF PRESENT ILLNESS
89 yo male with hx of dementia presents to the ED for right buttock wound. Patient disoriented at baseline. Daughter states that he often shifts himself in bed causing skin tear on left buttock that is now worse. Bleeds often. pt. does not give any history. no fever, chills, cough, sick contacts , n/v/d per history. Patient borderline hypotensive on arrival, no fever.  pt. is a 89 yo male with hx of dementia , htn, bph, presents to the ED for right buttock wound. Patient disoriented at baseline. Daughter states that he often shifts himself in bed causing skin tear on left buttock that is now worse. Bleeds often. pt. does not give any history. no fever, chills, cough, sick contacts , n/v/d per history. Patient borderline hypotensive on arrival, no fever.  pt. is a 89 yo male with hx of dementia , htn, bph, presents to the ED for right buttock wound. Patient disoriented at baseline. Daughter states that he often shifts himself in bed causing skin tear on left buttock that is now worse. Bleeds often. pt. does not give any history. no fever, chills, cough, sick contacts , n/v/d per history. Patient borderline hypotensive on arrival, no fever. pt's cbc showed wbc 20.98, no fever, no bandemia , no obvious source for elevated wbc.

## 2021-07-30 NOTE — H&P ADULT - NSICDXPASTSURGICALHX_GEN_ALL_CORE_FT
[id number];  Therapeutic Exercise:    30     mins  06890;     Neuromuscular Eun:        mins  18737;    Therapeutic Activity:          mins  86890;     Gait Training:           mins  82461;     Ultrasound:          mins  15536;      Untimed:  Electrical Stimulation:         mins 09028 ( );  Mechanical Traction:         mins 14431  Hot/Cold pack:          mins    TIMED Treatment:    42   mins   TOTAL Treatment:   42   mins    Jaiden Dutton, PT, DPT, MTC, FAAOMPT  Physical Therapist  [place] License #[id number]       PAST SURGICAL HISTORY:  Coronary artery disease cardiac stents 2011    Elective surgery pt had bladder mass removed apporoximayly 6 weeks ago by Dr. Mccord    H/O cataract removal with insertion of prosthetic lens     H/O pilonidal cyst     H/O: hemorrhoidectomy     S/P appendectomy

## 2021-07-30 NOTE — ED ADULT NURSE NOTE - OBJECTIVE STATEMENT
Patient alert to self, confused to time, place & situation. Spoke to HCP/daughter Clarissa stated patient has pilonidal cyst to coccyx area, was placed on Ciprofloxacin BID x10 days, today is 10th day. Presented on Wednesday to visiting nurse with Patient alert to self, confused to time, place & situation. Spoke to HCP/daughter Clarissa stated patient is at baseline due to history of Dementia, has pilonidal cyst to coccyx area, was placed on Ciprofloxacin BID x10 days, today is 10th day. Presented on Wednesday to visiting nurse with open wound from coccyx to left buttock. Presents to ED with open wound to area. Unable to measure at this time due to patient not being able to tolerate laying on right side. Cardiac monitor in place. Respirations even & unlabored.

## 2021-07-30 NOTE — ED ADULT NURSE NOTE - PAIN: PRESENCE, MLM
denies pain/discomfort
You can access the FollowMyHealth Patient Portal offered by [facility] by registering at the following website: [email or web address]. By joining [facility]’s FollowMyHealth portal, you will also be able to view your health information using other applications (apps) compatible with our system.

## 2021-07-31 DIAGNOSIS — S31.829D UNSPECIFIED OPEN WOUND OF LEFT BUTTOCK, SUBSEQUENT ENCOUNTER: ICD-10-CM

## 2021-07-31 DIAGNOSIS — F03.90 UNSPECIFIED DEMENTIA, UNSPECIFIED SEVERITY, WITHOUT BEHAVIORAL DISTURBANCE, PSYCHOTIC DISTURBANCE, MOOD DISTURBANCE, AND ANXIETY: ICD-10-CM

## 2021-07-31 DIAGNOSIS — N17.9 ACUTE KIDNEY FAILURE, UNSPECIFIED: ICD-10-CM

## 2021-07-31 DIAGNOSIS — Z02.9 ENCOUNTER FOR ADMINISTRATIVE EXAMINATIONS, UNSPECIFIED: ICD-10-CM

## 2021-07-31 DIAGNOSIS — D72.829 ELEVATED WHITE BLOOD CELL COUNT, UNSPECIFIED: ICD-10-CM

## 2021-07-31 LAB
ALBUMIN SERPL ELPH-MCNC: 2.9 G/DL — LOW (ref 3.3–5.2)
ALP SERPL-CCNC: 90 U/L — SIGNIFICANT CHANGE UP (ref 40–120)
ALT FLD-CCNC: 8 U/L — SIGNIFICANT CHANGE UP
ANION GAP SERPL CALC-SCNC: 12 MMOL/L — SIGNIFICANT CHANGE UP (ref 5–17)
AST SERPL-CCNC: 48 U/L — HIGH
BASOPHILS # BLD AUTO: 0.04 K/UL — SIGNIFICANT CHANGE UP (ref 0–0.2)
BASOPHILS NFR BLD AUTO: 0.2 % — SIGNIFICANT CHANGE UP (ref 0–2)
BILIRUB SERPL-MCNC: 0.9 MG/DL — SIGNIFICANT CHANGE UP (ref 0.4–2)
BUN SERPL-MCNC: 68.6 MG/DL — HIGH (ref 8–20)
CALCIUM SERPL-MCNC: 9 MG/DL — SIGNIFICANT CHANGE UP (ref 8.6–10.2)
CHLORIDE SERPL-SCNC: 109 MMOL/L — HIGH (ref 98–107)
CO2 SERPL-SCNC: 22 MMOL/L — SIGNIFICANT CHANGE UP (ref 22–29)
COVID-19 SPIKE DOMAIN AB INTERP: POSITIVE
COVID-19 SPIKE DOMAIN ANTIBODY RESULT: >250 U/ML — HIGH
CREAT SERPL-MCNC: 1.99 MG/DL — HIGH (ref 0.5–1.3)
CULTURE RESULTS: SIGNIFICANT CHANGE UP
EOSINOPHIL # BLD AUTO: 0.03 K/UL — SIGNIFICANT CHANGE UP (ref 0–0.5)
EOSINOPHIL NFR BLD AUTO: 0.2 % — SIGNIFICANT CHANGE UP (ref 0–6)
GLUCOSE SERPL-MCNC: 113 MG/DL — HIGH (ref 70–99)
HCT VFR BLD CALC: 49.5 % — SIGNIFICANT CHANGE UP (ref 39–50)
HGB BLD-MCNC: 16.4 G/DL — SIGNIFICANT CHANGE UP (ref 13–17)
IMM GRANULOCYTES NFR BLD AUTO: 1 % — SIGNIFICANT CHANGE UP (ref 0–1.5)
LYMPHOCYTES # BLD AUTO: 0.96 K/UL — LOW (ref 1–3.3)
LYMPHOCYTES # BLD AUTO: 5.5 % — LOW (ref 13–44)
MCHC RBC-ENTMCNC: 30.2 PG — SIGNIFICANT CHANGE UP (ref 27–34)
MCHC RBC-ENTMCNC: 33.1 GM/DL — SIGNIFICANT CHANGE UP (ref 32–36)
MCV RBC AUTO: 91.2 FL — SIGNIFICANT CHANGE UP (ref 80–100)
MONOCYTES # BLD AUTO: 0.93 K/UL — HIGH (ref 0–0.9)
MONOCYTES NFR BLD AUTO: 5.3 % — SIGNIFICANT CHANGE UP (ref 2–14)
NEUTROPHILS # BLD AUTO: 15.27 K/UL — HIGH (ref 1.8–7.4)
NEUTROPHILS NFR BLD AUTO: 87.8 % — HIGH (ref 43–77)
PLATELET # BLD AUTO: 100 K/UL — LOW (ref 150–400)
POTASSIUM SERPL-MCNC: 5.1 MMOL/L — SIGNIFICANT CHANGE UP (ref 3.5–5.3)
POTASSIUM SERPL-SCNC: 5.1 MMOL/L — SIGNIFICANT CHANGE UP (ref 3.5–5.3)
PROT SERPL-MCNC: 6.5 G/DL — LOW (ref 6.6–8.7)
RBC # BLD: 5.43 M/UL — SIGNIFICANT CHANGE UP (ref 4.2–5.8)
RBC # FLD: 13.5 % — SIGNIFICANT CHANGE UP (ref 10.3–14.5)
SARS-COV-2 IGG+IGM SERPL QL IA: >250 U/ML — HIGH
SARS-COV-2 IGG+IGM SERPL QL IA: POSITIVE
SODIUM SERPL-SCNC: 143 MMOL/L — SIGNIFICANT CHANGE UP (ref 135–145)
SODIUM UR-SCNC: <30 MMOL/L — SIGNIFICANT CHANGE UP
SPECIMEN SOURCE: SIGNIFICANT CHANGE UP
WBC # BLD: 17.41 K/UL — HIGH (ref 3.8–10.5)
WBC # FLD AUTO: 17.41 K/UL — HIGH (ref 3.8–10.5)

## 2021-07-31 PROCEDURE — 99233 SBSQ HOSP IP/OBS HIGH 50: CPT

## 2021-07-31 PROCEDURE — 76775 US EXAM ABDO BACK WALL LIM: CPT | Mod: 26

## 2021-07-31 PROCEDURE — 71250 CT THORAX DX C-: CPT | Mod: 26

## 2021-07-31 RX ORDER — SODIUM CHLORIDE 9 MG/ML
1000 INJECTION, SOLUTION INTRAVENOUS
Refills: 0 | Status: DISCONTINUED | OUTPATIENT
Start: 2021-07-31 | End: 2021-08-01

## 2021-07-31 RX ADMIN — Medication 1 TABLET(S): at 05:29

## 2021-07-31 RX ADMIN — PIPERACILLIN AND TAZOBACTAM 25 GRAM(S): 4; .5 INJECTION, POWDER, LYOPHILIZED, FOR SOLUTION INTRAVENOUS at 17:59

## 2021-07-31 RX ADMIN — PIPERACILLIN AND TAZOBACTAM 25 GRAM(S): 4; .5 INJECTION, POWDER, LYOPHILIZED, FOR SOLUTION INTRAVENOUS at 05:29

## 2021-07-31 RX ADMIN — DONEPEZIL HYDROCHLORIDE 10 MILLIGRAM(S): 10 TABLET, FILM COATED ORAL at 22:16

## 2021-07-31 RX ADMIN — HEPARIN SODIUM 5000 UNIT(S): 5000 INJECTION INTRAVENOUS; SUBCUTANEOUS at 05:31

## 2021-07-31 RX ADMIN — SIMVASTATIN 20 MILLIGRAM(S): 20 TABLET, FILM COATED ORAL at 22:16

## 2021-07-31 RX ADMIN — Medication 1 TABLET(S): at 18:00

## 2021-07-31 RX ADMIN — Medication 1 APPLICATION(S): at 05:29

## 2021-07-31 RX ADMIN — TAMSULOSIN HYDROCHLORIDE 0.8 MILLIGRAM(S): 0.4 CAPSULE ORAL at 22:16

## 2021-07-31 RX ADMIN — Medication 1 APPLICATION(S): at 18:01

## 2021-07-31 RX ADMIN — HEPARIN SODIUM 5000 UNIT(S): 5000 INJECTION INTRAVENOUS; SUBCUTANEOUS at 18:01

## 2021-07-31 NOTE — SWALLOW BEDSIDE ASSESSMENT ADULT - COMMENTS
As per H&P: pt. is a 89 yo male with hx of dementia , htn, bph, presents to the ED for right buttock wound. Patient disoriented at baseline. Daughter states that he often shifts himself in bed causing skin tear on left buttock that is now worse. Bleeds often. pt. does not give any history. no fever, chills, cough, sick contacts , n/v/d per history. Patient borderline hypotensive on arrival, no fever. pt's cbc showed wbc 20.98, no fever, no bandemia , no obvious source for elevated wbc.     SLP spoke with RN who reported patient consumed breakfast this morning with no difficulty.     SLP received patient in bed, awake, alert, inconsistently able to follow directions and answer questions. No physical indicators of pain present.  RN aware of results.  Patient left in no distress.

## 2021-07-31 NOTE — PROGRESS NOTE ADULT - PROBLEM SELECTOR PLAN 2
- Likely 2/2 hypoperfusion  - F/u US kidneys to r/o obstruction  - appreciate nephro recs- start bicarb with IVF for metabolic acidosis

## 2021-07-31 NOTE — PROGRESS NOTE ADULT - ASSESSMENT
Patient is a 89 yo male with hx of dementia , htn, bph, presents to the ED for right buttock wound. Patient disoriented at baseline. Daughter states that he often shifts himself in bed causing skin tear on left buttock that is now worse. Bleeds often. Patient does not give any history. no fever, chills, cough, sick contacts, n/v/d per history. Patient borderline hypotensive on arrival, no fever. pt's cbc showed wbc 20.98, no fever, no bandemia, no obvious source for elevated wbc.  Patient is a 89 yo male with hx of dementia , htn, bph, presents to the ED for right buttock wound. Patient disoriented at baseline. Daughter states that he often shifts himself in bed causing skin tear on left buttock that is now worse. Bleeds often. Patient does not give any history. no fever, chills, cough, sick contacts, n/v/d per history. Patient borderline hypotensive on arrival, no fever. pt's cbc showed wbc 20.98, no fever, no bandemia, no obvious source for elevated wbc. No pending infectious workup. Patient is a 89 yo male with hx of dementia , htn, bph, presents to the ED from home for right buttock wound. Patient disoriented at baseline. Daughter states that he often shifts himself in bed causing skin tear on left buttock that is now worse. Bleeds often. Patient does not give any history. no fever, chills, cough, sick contacts, n/v/d per history. Patient borderline hypotensive on arrival, no fever. pt's cbc showed wbc 20.98, no fever, no bandemia, no obvious source for elevated wbc. No pending infectious workup.

## 2021-07-31 NOTE — PROGRESS NOTE ADULT - PROBLEM SELECTOR PLAN 3
-  F/u wound care recs - Left buttock area with about 5 cm x 5cm skin tear with some serosanguinous discharge   - F/u wound care recs - Left buttock area with about 5 cm x 5cm skin tear with some serosanguinous discharge  - F/u wound care recs

## 2021-07-31 NOTE — SWALLOW BEDSIDE ASSESSMENT ADULT - ADDITIONAL RECOMMENDATIONS
1. Monitor tolerance and reconsult this service as indicated  2. This service will attempt to follow up as schedule permits

## 2021-07-31 NOTE — PROGRESS NOTE ADULT - PROBLEM SELECTOR PLAN 1
- Possibly 2/2 RT buttock wound. Now downtrending.  - Will empirically treat with IV Zosyn   - F/u CT chest to r/o PNA  - F/u blood and urine cx

## 2021-07-31 NOTE — SWALLOW BEDSIDE ASSESSMENT ADULT - SWALLOW EVAL: RECOMMENDED FEEDING/EATING TECHNIQUES
alternate food with liquid/crush medication (when feasible)/maintain upright posture during/after eating for 30 mins/position upright (90 degrees)/small sips/bites

## 2021-07-31 NOTE — PROGRESS NOTE ADULT - SUBJECTIVE AND OBJECTIVE BOX
[facility] Division of Hospital Medicine    Chief Complaint:  Leukpcytosis    SUBJECTIVE / OVERNIGHT EVENTS: No acute events overnight. He endorses sleeping well.     Patient denies chest pain, SOB, abd pain, N/V, fever, chills, dysuria or any other complaints. All remainder ROS negative.     MEDICATIONS  (STANDING):  BACItracin   Ointment 1 Application(s) Topical two times a day  dextrose 5% + sodium chloride 0.45% 1000 milliLiter(s) (75 mL/Hr) IV Continuous <Continuous>  donepezil 10 milliGRAM(s) Oral at bedtime  heparin   Injectable 5000 Unit(s) SubCutaneous every 12 hours  lactobacillus acidophilus 1 Tablet(s) Oral two times a day  piperacillin/tazobactam IVPB.. 3.375 Gram(s) IV Intermittent every 12 hours  simvastatin 20 milliGRAM(s) Oral at bedtime  tamsulosin 0.8 milliGRAM(s) Oral at bedtime    MEDICATIONS  (PRN):  QUEtiapine 25 milliGRAM(s) Oral at bedtime PRN agitation  senna 2 Tablet(s) Oral at bedtime PRN Constipation        I&O's Summary      PHYSICAL EXAM:  Vital Signs Last 24 Hrs  T(C): 36.4 (2021 10:37), Max: 36.8 (2021 04:42)  T(F): 97.5 (2021 10:37), Max: 98.2 (2021 04:42)  HR: 60 (2021 10:37) (59 - 76)  BP: 111/76 (2021 10:37) (89/59 - 129/68)  BP(mean): --  RR: 18 (2021 10:37) (18 - 20)  SpO2: 99% (2021 10:37) (90% - 100%)        CONSTITUTIONAL: NAD, resting comformtably  ENMT: Moist oral mucosa, no pharyngeal injection or exudates; normal dentition  RESPIRATORY: Normal respiratory effort; lungs are clear to auscultation bilaterally  CARDIOVASCULAR: Regular rate and rhythm, normal S1 and S2, no murmur/rub/gallop; No lower extremity edema; Peripheral pulses are 2+ bilaterally  ABDOMEN: Nontender to palpation, normoactive bowel sounds, no rebound/guarding  PSYCH: AxO x1 (name), speech clear, moving all extremities  NEUROLOGY: CN 2-12 are intact and symmetric; no gross sensory deficits;   SKIN:  left buttock area with about 5 cm x 5cm skin tear with some serosanguinous discharge, skin tear appears fresh, no signs of infection or foul smell discharge    LABS:                        16.4   17.41 )-----------( 100      ( 2021 07:53 )             49.5         143  |  109<H>  |  68.6<H>  ----------------------------<  113<H>  5.1   |  22.0  |  1.99<H>    Ca    9.0      2021 07:53    TPro  6.5<L>  /  Alb  2.9<L>  /  TBili  0.9  /  DBili  x   /  AST  48<H>  /  ALT  8   /  AlkPhos  90            Urinalysis Basic - ( 2021 18:51 )    Color: Yellow / Appearance: Clear / S.020 / pH: x  Gluc: x / Ketone: Negative  / Bili: Negative / Urobili: Negative mg/dL   Blood: x / Protein: 30 mg/dL / Nitrite: Negative   Leuk Esterase: Negative / RBC: 0-2 /HPF / WBC Negative   Sq Epi: x / Non Sq Epi: x / Bacteria: x        CAPILLARY BLOOD GLUCOSE            RADIOLOGY & ADDITIONAL TESTS:  Results Reviewed:   Imaging Personally Reviewed:  Electrocardiogram Personally Reviewed:                                           [facility] Division of Hospital Medicine    Chief Complaint:  Leukpcytosis    SUBJECTIVE / OVERNIGHT EVENTS: No acute events overnight. He endorses sleeping well.     Patient denies chest pain, SOB, abd pain, N/V, fever, chills, dysuria or any other complaints. All remainder ROS negative.     MEDICATIONS  (STANDING):  BACItracin   Ointment 1 Application(s) Topical two times a day  dextrose 5% + sodium chloride 0.45% 1000 milliLiter(s) (75 mL/Hr) IV Continuous <Continuous>  donepezil 10 milliGRAM(s) Oral at bedtime  heparin   Injectable 5000 Unit(s) SubCutaneous every 12 hours  lactobacillus acidophilus 1 Tablet(s) Oral two times a day  piperacillin/tazobactam IVPB.. 3.375 Gram(s) IV Intermittent every 12 hours  simvastatin 20 milliGRAM(s) Oral at bedtime  tamsulosin 0.8 milliGRAM(s) Oral at bedtime    MEDICATIONS  (PRN):  QUEtiapine 25 milliGRAM(s) Oral at bedtime PRN agitation  senna 2 Tablet(s) Oral at bedtime PRN Constipation        I&O's Summary      PHYSICAL EXAM:  Vital Signs Last 24 Hrs  T(C): 36.4 (2021 10:37), Max: 36.8 (2021 04:42)  T(F): 97.5 (2021 10:37), Max: 98.2 (2021 04:42)  HR: 60 (2021 10:37) (59 - 76)  BP: 111/76 (2021 10:37) (89/59 - 129/68)  BP(mean): --  RR: 18 (2021 10:37) (18 - 20)  SpO2: 99% (2021 10:37) (90% - 100%)        CONSTITUTIONAL: NAD, resting comformtably  ENMT: Moist oral mucosa, no pharyngeal injection or exudates; normal dentition  RESPIRATORY: Normal respiratory effort; lungs are clear to auscultation bilaterally  CARDIOVASCULAR: Regular rate and rhythm, normal S1 and S2, no murmur/rub/gallop; No lower extremity edema; Peripheral pulses are 2+ bilaterally  ABDOMEN: Nontender to palpation, normoactive bowel sounds, no rebound/guarding  PSYCH: AxO x1 (name), speech clear, moving all extremities  NEUROLOGY: CN 2-12 are intact and symmetric; no gross sensory deficits;   SKIN: left buttock area with about 5 cm x 5cm skin tear with some serosanguinous discharge, skin tear appears fresh, no signs of infection or foul smell discharge    LABS:                        16.4   17.41 )-----------( 100      ( 2021 07:53 )             49.5         143  |  109<H>  |  68.6<H>  ----------------------------<  113<H>  5.1   |  22.0  |  1.99<H>    Ca    9.0      2021 07:53    TPro  6.5<L>  /  Alb  2.9<L>  /  TBili  0.9  /  DBili  x   /  AST  48<H>  /  ALT  8   /  AlkPhos  90            Urinalysis Basic - ( 2021 18:51 )    Color: Yellow / Appearance: Clear / S.020 / pH: x  Gluc: x / Ketone: Negative  / Bili: Negative / Urobili: Negative mg/dL   Blood: x / Protein: 30 mg/dL / Nitrite: Negative   Leuk Esterase: Negative / RBC: 0-2 /HPF / WBC Negative   Sq Epi: x / Non Sq Epi: x / Bacteria: x        CAPILLARY BLOOD GLUCOSE            RADIOLOGY & ADDITIONAL TESTS:  Results Reviewed:   Imaging Personally Reviewed:  Electrocardiogram Personally Reviewed:

## 2021-07-31 NOTE — SWALLOW BEDSIDE ASSESSMENT ADULT - SWALLOW EVAL: DIAGNOSIS
Patient consumed trials of puree, nectar thick liquids and thin liquids presenting with oropharyngeal dysphagia. Oral phase characterized by adequate acceptance, adequate anterior bolus containment, slow bolus manipulation with repetitive lingual movement, suspect premature spillage and adequate oral cavity clearance. Pharyngeal phase characterized by suspect delayed initiation of the pharyngeal swallow, hyolaryngeal elevation and excursion noted upon palpation. Coughing following trials of thin liquids indicative of laryngeal penetration/aspiration. No clinically overt signs or symptoms of aspiration across trials of puree and nectar thick liquids.

## 2021-07-31 NOTE — PROGRESS NOTE ADULT - PROBLEM SELECTOR PLAN 5
- DVt ppx- Heparin ppx  - Diet- Dysphagia 1 pureed nectar - DVT ppx- Heparin ppx  - Diet- Dysphagia 1 pureed nectar  - Dispo- Pending infectious workup    Patient is actively acute, no plan for discharge at this time pending clinical course. Patient is at high risk at the moment due to infectious workup and PT recs. - DVT ppx- Heparin ppx  - Diet- Dysphagia 1 pureed nectar  - Dispo- Pending infectious workup    - Updated patient's daughter, Clarissa on 7/31    Patient is actively acute, no plan for discharge at this time pending clinical course. Patient is at high risk at the moment due to infectious workup and PT recs.

## 2021-07-31 NOTE — CONSULT NOTE ADULT - ASSESSMENT
89 yo male with hx of dementia , htn, bph,   presents to the ED for right buttock wound    TRICIA suspect 2/2 hypoperfusion  Will adjust IVF  MA will add bicarb to IVF  Buttock wound Leukocytosis  Abx noted renal dose Zosyn  Renal sono r/o obstructive etiology    AM labs, will follow

## 2021-07-31 NOTE — CONSULT NOTE ADULT - SUBJECTIVE AND OBJECTIVE BOX
HPI:  pt. is a 89 yo male with hx of dementia , htn, bph, presents to the ED for right buttock wound. Patient disoriented at baseline. Daughter states that he often shifts himself in bed causing skin tear on left buttock that is now worse. Bleeds often. pt. does not give any history. no fever, chills, cough, sick contacts , n/v/d per history. Patient borderline hypotensive on arrival, no fever. pt's cbc showed wbc 20.98, no fever, no bandemia , no obvious source for elevated wbc.       PAST MEDICAL & SURGICAL HISTORY:  Reflux    Anginal pain    Coronary artery disease    Enlarged prostate    Heart attack    TIA (transient ischemic attack)    Cataract    Stented coronary artery  x1    BPH (benign prostatic hypertrophy)    Hypothyroidism    Dementia    Alcohol abuse    S/P appendectomy    H/O: hemorrhoidectomy    H/O pilonidal cyst    Coronary artery disease  cardiac stents     H/O cataract removal with insertion of prosthetic lens    Elective surgery  pt had bladder mass removed apporoximayly 6 weeks ago by Dr. Mccord      FAMILY HISTORY:  Family history of hypertension  sibling    NC    Social History:Non smoker    MEDICATIONS  (STANDING):  BACItracin   Ointment 1 Application(s) Topical two times a day  donepezil 10 milliGRAM(s) Oral at bedtime  heparin   Injectable 5000 Unit(s) SubCutaneous every 12 hours  lactobacillus acidophilus 1 Tablet(s) Oral two times a day  piperacillin/tazobactam IVPB.. 3.375 Gram(s) IV Intermittent every 12 hours  simvastatin 20 milliGRAM(s) Oral at bedtime  sodium chloride 0.9%. 1000 milliLiter(s) (70 mL/Hr) IV Continuous <Continuous>  tamsulosin 0.8 milliGRAM(s) Oral at bedtime    MEDICATIONS  (PRN):  QUEtiapine 25 milliGRAM(s) Oral at bedtime PRN agitation  senna 2 Tablet(s) Oral at bedtime PRN Constipation   Meds reviewed      Vital Signs Last 24 Hrs  T(C): 36.8 (2021 04:42), Max: 36.8 (2021 04:42)  T(F): 98.2 (2021 04:42), Max: 98.2 (2021 04:42)  HR: 59 (2021 04:42) (59 - 76)  BP: 129/68 (2021 04:42) (89/59 - 129/68)  BP(mean): --  RR: 19 (2021 04:42) (18 - 20)  SpO2: 96% (2021 04:42) (90% - 100%)  Daily Height in cm: 193.04 (2021 14:32)    Daily     PHYSICAL EXAM:    GENERAL: appears chronically ill  HEAD: NCAT  EYES: EOMI  NECK: Supple  NERVOUS SYSTEM: Awake  CHEST/LUNG: dec bs B/L  HEART: Regular rate and rhythm  ABDOMEN: Soft, Nontender, Nondistended; +BS  EXTREMITIES:   edema      LABS:                        16.8   20.94 )-----------( 103      ( 2021 16:32 )             52.0     07-30    138  |  106  |  74.0<H>  ----------------------------<  129<H>  4.8   |  17.0<L>  |  1.88<H>    Ca    9.0      2021 17:16    TPro  6.7  /  Alb  2.7<L>  /  TBili  0.7  /  DBili  x   /  AST  53<H>  /  ALT  9   /  AlkPhos  92  07-30      Urinalysis Basic - ( 2021 18:51 )    Color: Yellow / Appearance: Clear / S.020 / pH: x  Gluc: x / Ketone: Negative  / Bili: Negative / Urobili: Negative mg/dL   Blood: x / Protein: 30 mg/dL / Nitrite: Negative   Leuk Esterase: Negative / RBC: 0-2 /HPF / WBC Negative   Sq Epi: x / Non Sq Epi: x / Bacteria: x              RADIOLOGY & ADDITIONAL TESTS:     HPI:  pt. is a 89 yo male with hx of dementia , htn, bph, presents to the ED for right buttock wound. Patient disoriented at baseline. Daughter states that he often shifts himself in bed causing skin tear on left buttock that is now worse. Bleeds often. pt. does not give any history. no fever, chills, cough, sick contacts , n/v/d per history. Patient borderline hypotensive on arrival, no fever. pt's cbc showed wbc 20.98, no fever, no bandemia , no obvious source for elevated wbc.   ROS frail responds slowly denies HA CP no Dyspnea laying flat    PAST MEDICAL & SURGICAL HISTORY:  Reflux    Anginal pain    Coronary artery disease    Enlarged prostate    Heart attack    TIA (transient ischemic attack)    Cataract    Stented coronary artery  x1    BPH (benign prostatic hypertrophy)    Hypothyroidism    Dementia    Alcohol abuse    S/P appendectomy    H/O: hemorrhoidectomy    H/O pilonidal cyst    Coronary artery disease  cardiac stents     H/O cataract removal with insertion of prosthetic lens    Elective surgery  pt had bladder mass removed apporoximayly 6 weeks ago by Dr. Mccord      FAMILY HISTORY:  Family history of hypertension  sibling    NC    Social History:Non smoker    MEDICATIONS  (STANDING):  BACItracin   Ointment 1 Application(s) Topical two times a day  donepezil 10 milliGRAM(s) Oral at bedtime  heparin   Injectable 5000 Unit(s) SubCutaneous every 12 hours  lactobacillus acidophilus 1 Tablet(s) Oral two times a day  piperacillin/tazobactam IVPB.. 3.375 Gram(s) IV Intermittent every 12 hours  simvastatin 20 milliGRAM(s) Oral at bedtime  sodium chloride 0.9%. 1000 milliLiter(s) (70 mL/Hr) IV Continuous <Continuous>  tamsulosin 0.8 milliGRAM(s) Oral at bedtime    MEDICATIONS  (PRN):  QUEtiapine 25 milliGRAM(s) Oral at bedtime PRN agitation  senna 2 Tablet(s) Oral at bedtime PRN Constipation   Meds reviewed      Vital Signs Last 24 Hrs  T(C): 36.8 (2021 04:42), Max: 36.8 (2021 04:42)  T(F): 98.2 (2021 04:42), Max: 98.2 (2021 04:42)  HR: 59 (2021 04:42) (59 - 76)  BP: 129/68 (2021 04:42) (89/59 - 129/68)  BP(mean): --  RR: 19 (2021 04:42) (18 - 20)  SpO2: 96% (2021 04:42) (90% - 100%)  Daily Height in cm: 193.04 (2021 14:32)    Daily     PHYSICAL EXAM:    GENERAL: elderly/ frail  HEAD: NCAT  EYES: EOMI  NECK: Supple  NERVOUS SYSTEM: Awake   CHEST/LUNG: dec bs B/L  HEART: Regular rate and rhythm  ABDOMEN: Soft, Nontender, Nondistended; +BS  EXTREMITIES:   no edema      LABS:                        16.8   20.94 )-----------( 103      ( 2021 16:32 )             52.0     07    138  |  106  |  74.0<H>  ----------------------------<  129<H>  4.8   |  17.0<L>  |  1.88<H>    Ca    9.0      2021 17:16    TPro  6.7  /  Alb  2.7<L>  /  TBili  0.7  /  DBili  x   /  AST  53<H>  /  ALT  9   /  AlkPhos  92        Urinalysis Basic - ( 2021 18:51 )    Color: Yellow / Appearance: Clear / S.020 / pH: x  Gluc: x / Ketone: Negative  / Bili: Negative / Urobili: Negative mg/dL   Blood: x / Protein: 30 mg/dL / Nitrite: Negative   Leuk Esterase: Negative / RBC: 0-2 /HPF / WBC Negative   Sq Epi: x / Non Sq Epi: x / Bacteria: x              RADIOLOGY & ADDITIONAL TESTS:

## 2021-08-01 LAB
ALBUMIN SERPL ELPH-MCNC: 2.4 G/DL — LOW (ref 3.3–5.2)
ALP SERPL-CCNC: 73 U/L — SIGNIFICANT CHANGE UP (ref 40–120)
ALT FLD-CCNC: 11 U/L — SIGNIFICANT CHANGE UP
ANION GAP SERPL CALC-SCNC: 13 MMOL/L — SIGNIFICANT CHANGE UP (ref 5–17)
AST SERPL-CCNC: 56 U/L — HIGH
BILIRUB SERPL-MCNC: 0.9 MG/DL — SIGNIFICANT CHANGE UP (ref 0.4–2)
BUN SERPL-MCNC: 56.6 MG/DL — HIGH (ref 8–20)
CALCIUM SERPL-MCNC: 8.1 MG/DL — LOW (ref 8.6–10.2)
CHLORIDE SERPL-SCNC: 110 MMOL/L — HIGH (ref 98–107)
CO2 SERPL-SCNC: 19 MMOL/L — LOW (ref 22–29)
CREAT SERPL-MCNC: 1.81 MG/DL — HIGH (ref 0.5–1.3)
GLUCOSE SERPL-MCNC: 121 MG/DL — HIGH (ref 70–99)
HCT VFR BLD CALC: 48.4 % — SIGNIFICANT CHANGE UP (ref 39–50)
HGB BLD-MCNC: 15.6 G/DL — SIGNIFICANT CHANGE UP (ref 13–17)
MAGNESIUM SERPL-MCNC: 2 MG/DL — SIGNIFICANT CHANGE UP (ref 1.6–2.6)
MCHC RBC-ENTMCNC: 29.5 PG — SIGNIFICANT CHANGE UP (ref 27–34)
MCHC RBC-ENTMCNC: 32.2 GM/DL — SIGNIFICANT CHANGE UP (ref 32–36)
MCV RBC AUTO: 91.7 FL — SIGNIFICANT CHANGE UP (ref 80–100)
PHOSPHATE SERPL-MCNC: 2.9 MG/DL — SIGNIFICANT CHANGE UP (ref 2.4–4.7)
PLATELET # BLD AUTO: 95 K/UL — LOW (ref 150–400)
POTASSIUM SERPL-MCNC: 3.7 MMOL/L — SIGNIFICANT CHANGE UP (ref 3.5–5.3)
POTASSIUM SERPL-SCNC: 3.7 MMOL/L — SIGNIFICANT CHANGE UP (ref 3.5–5.3)
PROT SERPL-MCNC: 5.6 G/DL — LOW (ref 6.6–8.7)
RBC # BLD: 5.28 M/UL — SIGNIFICANT CHANGE UP (ref 4.2–5.8)
RBC # FLD: 13.5 % — SIGNIFICANT CHANGE UP (ref 10.3–14.5)
SODIUM SERPL-SCNC: 142 MMOL/L — SIGNIFICANT CHANGE UP (ref 135–145)
WBC # BLD: 14.6 K/UL — HIGH (ref 3.8–10.5)
WBC # FLD AUTO: 14.6 K/UL — HIGH (ref 3.8–10.5)

## 2021-08-01 PROCEDURE — 99232 SBSQ HOSP IP/OBS MODERATE 35: CPT

## 2021-08-01 RX ORDER — BACITRACIN ZINC 500 UNIT/G
1 OINTMENT IN PACKET (EA) TOPICAL DAILY
Refills: 0 | Status: DISCONTINUED | OUTPATIENT
Start: 2021-08-01 | End: 2021-08-05

## 2021-08-01 RX ORDER — SODIUM CHLORIDE 9 MG/ML
1000 INJECTION, SOLUTION INTRAVENOUS
Refills: 0 | Status: DISCONTINUED | OUTPATIENT
Start: 2021-08-01 | End: 2021-08-02

## 2021-08-01 RX ADMIN — Medication 1 APPLICATION(S): at 05:08

## 2021-08-01 RX ADMIN — TAMSULOSIN HYDROCHLORIDE 0.8 MILLIGRAM(S): 0.4 CAPSULE ORAL at 21:10

## 2021-08-01 RX ADMIN — HEPARIN SODIUM 5000 UNIT(S): 5000 INJECTION INTRAVENOUS; SUBCUTANEOUS at 05:08

## 2021-08-01 RX ADMIN — Medication 1 TABLET(S): at 05:08

## 2021-08-01 RX ADMIN — PIPERACILLIN AND TAZOBACTAM 25 GRAM(S): 4; .5 INJECTION, POWDER, LYOPHILIZED, FOR SOLUTION INTRAVENOUS at 05:07

## 2021-08-01 RX ADMIN — SODIUM CHLORIDE 75 MILLILITER(S): 9 INJECTION, SOLUTION INTRAVENOUS at 10:39

## 2021-08-01 RX ADMIN — DONEPEZIL HYDROCHLORIDE 10 MILLIGRAM(S): 10 TABLET, FILM COATED ORAL at 21:10

## 2021-08-01 RX ADMIN — SIMVASTATIN 20 MILLIGRAM(S): 20 TABLET, FILM COATED ORAL at 21:10

## 2021-08-01 RX ADMIN — Medication 1 APPLICATION(S): at 17:40

## 2021-08-01 RX ADMIN — HEPARIN SODIUM 5000 UNIT(S): 5000 INJECTION INTRAVENOUS; SUBCUTANEOUS at 17:40

## 2021-08-01 RX ADMIN — PIPERACILLIN AND TAZOBACTAM 25 GRAM(S): 4; .5 INJECTION, POWDER, LYOPHILIZED, FOR SOLUTION INTRAVENOUS at 17:43

## 2021-08-01 RX ADMIN — Medication 1 TABLET(S): at 17:40

## 2021-08-01 NOTE — PROGRESS NOTE ADULT - ASSESSMENT
89 yo male with hx of dementia , htn, bph,   presents to the ED for right buttock wound    TRICIA suspect 2/2 hypoperfusion  Serum cr 1.8--> 1.9  Cont IVF gentle  MA improved bicarb added to IVF  Buttock wound Leukocytosis  Abx noted renal dose Zosyn  Renal sono noted no hydronephrosis  CXR noted clear    AM labs, will follow

## 2021-08-01 NOTE — PROGRESS NOTE ADULT - PROBLEM SELECTOR PLAN 1
- Possibly 2/2 RT buttock wound. Now downtrending.  - Will empirically treat with IV Zosyn   - F/u CT chest to r/o PNA- Questionable small foci of infiltrate in the right upper lobe posteriorly and in the posterior right lower lobe  - F/u blood cx  - ID c/s placed. Will follow up on recommendations

## 2021-08-01 NOTE — PROGRESS NOTE ADULT - PROBLEM SELECTOR PLAN 2
- Likely 2/2 hypoperfusion  - F/u US kidneys to r/o obstruction- No gross hydronephrosis. LT renal cyst.   - appreciate nephro recs- C/w D51/2NS w/ sodium bicarb

## 2021-08-01 NOTE — PROGRESS NOTE ADULT - SUBJECTIVE AND OBJECTIVE BOX
NEPHROLOGY INTERVAL HPI/OVERNIGHT EVENTS:    Examined  No distress  Lethargic    MEDICATIONS  (STANDING):  BACItracin   Ointment 1 Application(s) Topical two times a day  dextrose 5% + sodium chloride 0.45% 1000 milliLiter(s) (75 mL/Hr) IV Continuous <Continuous>  donepezil 10 milliGRAM(s) Oral at bedtime  heparin   Injectable 5000 Unit(s) SubCutaneous every 12 hours  lactobacillus acidophilus 1 Tablet(s) Oral two times a day  piperacillin/tazobactam IVPB.. 3.375 Gram(s) IV Intermittent every 12 hours  simvastatin 20 milliGRAM(s) Oral at bedtime  tamsulosin 0.8 milliGRAM(s) Oral at bedtime    MEDICATIONS  (PRN):  QUEtiapine 25 milliGRAM(s) Oral at bedtime PRN agitation  senna 2 Tablet(s) Oral at bedtime PRN Constipation      Allergies    No Known Allergies    Intolerances        Vital Signs Last 24 Hrs  T(C): 36.7 (01 Aug 2021 04:36), Max: 36.7 (01 Aug 2021 04:36)  T(F): 98.1 (01 Aug 2021 04:36), Max: 98.1 (01 Aug 2021 04:36)  HR: 64 (01 Aug 2021 04:36) (58 - 64)  BP: 108/64 (01 Aug 2021 04:36) (108/64 - 111/76)  BP(mean): --  RR: 18 (01 Aug 2021 04:36) (16 - 18)  SpO2: 94% (01 Aug 2021 04:36) (94% - 99%)  Daily     Daily     PHYSICAL EXAM:  GENERAL: elderly/ frail  HEAD: NCAT  EYES: EOMI  NECK: Supple  NERVOUS SYSTEM: Awake   CHEST/LUNG: dec bs B/L  HEART: Regular rate and rhythm  ABDOMEN: Soft, Nontender, Nondistended; +BS  EXTREMITIES:   no edema  LABS:                        16.4   17.41 )-----------( 100      ( 2021 07:53 )             49.5     07-    143  |  109<H>  |  68.6<H>  ----------------------------<  113<H>  5.1   |  22.0  |  1.99<H>    Ca    9.0      2021 07:53    TPro  6.5<L>  /  Alb  2.9<L>  /  TBili  0.9  /  DBili  x   /  AST  48<H>  /  ALT  8   /  AlkPhos  90        Urinalysis Basic - ( 2021 18:51 )    Color: Yellow / Appearance: Clear / S.020 / pH: x  Gluc: x / Ketone: Negative  / Bili: Negative / Urobili: Negative mg/dL   Blood: x / Protein: 30 mg/dL / Nitrite: Negative   Leuk Esterase: Negative / RBC: 0-2 /HPF / WBC Negative   Sq Epi: x / Non Sq Epi: x / Bacteria: x              RADIOLOGY & ADDITIONAL TESTS:

## 2021-08-01 NOTE — PROGRESS NOTE ADULT - SUBJECTIVE AND OBJECTIVE BOX
[facility] Division of Hospital Medicine    Chief Complaint:  Leukocytosis and TRICIA    SUBJECTIVE / OVERNIGHT EVENTS: No acute events overnight. HD stable.    Patient denies chest pain, SOB, abd pain, N/V, fever, chills, dysuria or any other complaints. All remainder ROS negative.     MEDICATIONS  (STANDING):  BACItracin   Ointment 1 Application(s) Topical two times a day  BACItracin   Ointment 1 Application(s) Topical daily  dextrose 5% + sodium chloride 0.45% 1000 milliLiter(s) (75 mL/Hr) IV Continuous <Continuous>  donepezil 10 milliGRAM(s) Oral at bedtime  heparin   Injectable 5000 Unit(s) SubCutaneous every 12 hours  lactobacillus acidophilus 1 Tablet(s) Oral two times a day  piperacillin/tazobactam IVPB.. 3.375 Gram(s) IV Intermittent every 12 hours  simvastatin 20 milliGRAM(s) Oral at bedtime  tamsulosin 0.8 milliGRAM(s) Oral at bedtime    MEDICATIONS  (PRN):  QUEtiapine 25 milliGRAM(s) Oral at bedtime PRN agitation  senna 2 Tablet(s) Oral at bedtime PRN Constipation        I&O's Summary    2021 07:01  -  01 Aug 2021 07:00  --------------------------------------------------------  IN: 975 mL / OUT: 0 mL / NET: 975 mL        PHYSICAL EXAM:  Vital Signs Last 24 Hrs  T(C): 36.4 (01 Aug 2021 09:59), Max: 36.7 (01 Aug 2021 04:36)  T(F): 97.6 (01 Aug 2021 09:59), Max: 98.1 (01 Aug 2021 04:36)  HR: 60 (01 Aug 2021 09:59) (58 - 64)  BP: 110/49 (01 Aug 2021 09:59) (108/64 - 110/67)  BP(mean): --  RR: 18 (01 Aug 2021 04:36) (16 - 18)  SpO2: 97% (01 Aug 2021 09:59) (94% - 99%)        CONSTITUTIONAL: NAD, resting comformtably  ENMT: Moist oral mucosa, no pharyngeal injection or exudates; normal dentition  RESPIRATORY: Normal respiratory effort; lungs are clear to auscultation bilaterally  CARDIOVASCULAR: Regular rate and rhythm, normal S1 and S2, no murmur/rub/gallop; No lower extremity edema; Peripheral pulses are 2+ bilaterally  ABDOMEN: Nontender to palpation, normoactive bowel sounds, no rebound/guarding  PSYCH: AxO x1 (name), speech clear, moving all extremities  NEUROLOGY: CN 2-12 are intact and symmetric; no gross sensory deficits;   SKIN: left buttock area with about 5 cm x 5cm skin tear with some serosanguinous discharge, skin tear appears fresh, no signs of infection or foul smell discharge      LABS:                        15.6   14.60 )-----------( 95       ( 01 Aug 2021 11:51 )             48.4     08-    142  |  110<H>  |  56.6<H>  ----------------------------<  121<H>  3.7   |  19.0<L>  |  1.81<H>    Ca    8.1<L>      01 Aug 2021 11:51  Phos  2.9     08-  Mg     2.0     08-    TPro  5.6<L>  /  Alb  2.4<L>  /  TBili  0.9  /  DBili  x   /  AST  56<H>  /  ALT  11  /  AlkPhos  73  08-          Urinalysis Basic - ( 2021 18:51 )    Color: Yellow / Appearance: Clear / S.020 / pH: x  Gluc: x / Ketone: Negative  / Bili: Negative / Urobili: Negative mg/dL   Blood: x / Protein: 30 mg/dL / Nitrite: Negative   Leuk Esterase: Negative / RBC: 0-2 /HPF / WBC Negative   Sq Epi: x / Non Sq Epi: x / Bacteria: x        Culture - Urine (collected 2021 01:22)  Source: Catheterized Catheterized  Final Report (2021 22:17):    <10,000 CFU/mL Normal Urogenital Samira      CAPILLARY BLOOD GLUCOSE            RADIOLOGY & ADDITIONAL TESTS:  Results Reviewed:   Imaging Personally Reviewed:  Electrocardiogram Personally Reviewed:                                           [facility] Division of Hospital Medicine    Chief Complaint:  Leukocytosis and TRICIA    SUBJECTIVE / OVERNIGHT EVENTS: No acute events overnight. HD stable.    Patient denies chest pain, SOB, abd pain, N/V, fever, chills, dysuria or any other complaints. All remainder ROS negative.     MEDICATIONS  (STANDING):  BACItracin   Ointment 1 Application(s) Topical two times a day  BACItracin   Ointment 1 Application(s) Topical daily  dextrose 5% + sodium chloride 0.45% 1000 milliLiter(s) (75 mL/Hr) IV Continuous <Continuous>  donepezil 10 milliGRAM(s) Oral at bedtime  heparin   Injectable 5000 Unit(s) SubCutaneous every 12 hours  lactobacillus acidophilus 1 Tablet(s) Oral two times a day  piperacillin/tazobactam IVPB.. 3.375 Gram(s) IV Intermittent every 12 hours  simvastatin 20 milliGRAM(s) Oral at bedtime  tamsulosin 0.8 milliGRAM(s) Oral at bedtime    MEDICATIONS  (PRN):  QUEtiapine 25 milliGRAM(s) Oral at bedtime PRN agitation  senna 2 Tablet(s) Oral at bedtime PRN Constipation        I&O's Summary    2021 07:01  -  01 Aug 2021 07:00  --------------------------------------------------------  IN: 975 mL / OUT: 0 mL / NET: 975 mL        PHYSICAL EXAM:  Vital Signs Last 24 Hrs  T(C): 36.4 (01 Aug 2021 09:59), Max: 36.7 (01 Aug 2021 04:36)  T(F): 97.6 (01 Aug 2021 09:59), Max: 98.1 (01 Aug 2021 04:36)  HR: 60 (01 Aug 2021 09:59) (58 - 64)  BP: 110/49 (01 Aug 2021 09:59) (108/64 - 110/67)  BP(mean): --  RR: 18 (01 Aug 2021 04:36) (16 - 18)  SpO2: 97% (01 Aug 2021 09:59) (94% - 99%)        CONSTITUTIONAL: NAD, resting comformtably  ENMT: Moist oral mucosa, no pharyngeal injection or exudates; normal dentition  RESPIRATORY: Normal respiratory effort; lungs are clear to auscultation bilaterally  CARDIOVASCULAR: Regular rate and rhythm, normal S1 and S2, no murmur/rub/gallop; No lower extremity edema; Peripheral pulses are 2+ bilaterally  ABDOMEN: Nontender to palpation, normoactive bowel sounds, no rebound/guarding  PSYCH: AxO x1 (name), speech clear, moving all extremities  NEUROLOGY: CN 2-12 are intact and symmetric; no gross sensory deficits;   SKIN: left buttock area with about 5 cm x 5cm skin tear covered in dressing, no drainage appreciated      LABS:                        15.6   14.60 )-----------( 95       ( 01 Aug 2021 11:51 )             48.4     08-    142  |  110<H>  |  56.6<H>  ----------------------------<  121<H>  3.7   |  19.0<L>  |  1.81<H>    Ca    8.1<L>      01 Aug 2021 11:51  Phos  2.9     08-  Mg     2.0     08    TPro  5.6<L>  /  Alb  2.4<L>  /  TBili  0.9  /  DBili  x   /  AST  56<H>  /  ALT  11  /  AlkPhos  73  08-          Urinalysis Basic - ( 2021 18:51 )    Color: Yellow / Appearance: Clear / S.020 / pH: x  Gluc: x / Ketone: Negative  / Bili: Negative / Urobili: Negative mg/dL   Blood: x / Protein: 30 mg/dL / Nitrite: Negative   Leuk Esterase: Negative / RBC: 0-2 /HPF / WBC Negative   Sq Epi: x / Non Sq Epi: x / Bacteria: x        Culture - Urine (collected 2021 01:22)  Source: Catheterized Catheterized  Final Report (2021 22:17):    <10,000 CFU/mL Normal Urogenital Samira      CAPILLARY BLOOD GLUCOSE            RADIOLOGY & ADDITIONAL TESTS:  Results Reviewed:   Imaging Personally Reviewed:  < from: CT Chest No Cont (21 @ 17:58) >  IMPRESSION: Questionable small foci of infiltrate in the right upper lobe posteriorly and in the posterior right lower lobe    --- End of Report ---            CAITLYN GUY MD; Attending Radiologist  This document has been electronically signed. 2021  6:55PM    < end of copied text >  < from: US Renal (21 @ 19:57) >  IMPRESSION:    No gross hydronephrosis although evaluation of the right kidney is technically limited  Left renal cyst  Grossly unremarkable appearance of the bladder    --- End of Report ---            CAITLYN GUY MD; Attending Radiologist  This document has been electronically signed. 2021  7:59PM    < end of copied text >      Electrocardiogram Personally Reviewed:

## 2021-08-01 NOTE — CHART NOTE - NSCHARTNOTEFT_GEN_A_CORE
Asked to re-evaluate pt's L. hand skin tear for questionable change in dressing order. Pt. has a hx. of confusion and is unable to help with hx. Skin tear noted yesterday per RN.  O: NAD, alert, elderly male,    - Pt. with thin skin noted with L. hand skin tear. No active bleeding, no hematoma, no swelling, no d/c or drainage, no increased warmth, no streaking. Clean and dry. 2+ radial and brachial pulses, cap refill < 2 sec. + FROM of hand and fingers.   A/P: L. hand skin tear   - NS -> bacitracin oint. -> non-stick DSD  * Discussed with Dr. Vaca who asked for wound care consult. Consult ordered.

## 2021-08-01 NOTE — PROGRESS NOTE ADULT - PROBLEM SELECTOR PLAN 5
- DVT ppx- Heparin ppx  - Diet- Dysphagia 1 pureed nectar  - Dispo- Pending infectious workup    - Updated patient's daughter, Clarissa on 8/1    Patient is actively acute, no plan for discharge at this time pending clinical course. Patient is at high risk at the moment due to infectious workup and PT recs.

## 2021-08-01 NOTE — CHART NOTE - NSCHARTNOTEFT_GEN_A_CORE
Infectious disease called for consult for pt's leukocytosis at Dr. Vaca's request. . Per service, Dr. Joel on call, awaiting call back.

## 2021-08-01 NOTE — PROGRESS NOTE ADULT - PROBLEM SELECTOR PLAN 3
- Left buttock area with about 5 cm x 5cm skin tear with some serosanguinous discharge  - F/u wound care recs- c/w gauze wrap and bacitracin

## 2021-08-01 NOTE — PROGRESS NOTE ADULT - ASSESSMENT
Patient is a 89 yo male with hx of dementia , htn, bph, presents to the ED from home for right buttock wound. Patient disoriented at baseline. Daughter states that he often shifts himself in bed causing skin tear on left buttock that is now worse. Bleeds often. Patient does not give any history. no fever, chills, cough, sick contacts, n/v/d per history. Patient borderline hypotensive on arrival, no fever. pt's cbc showed wbc 20.98, no fever, no bandemia, no obvious source for elevated wbc. No pending infectious workup.

## 2021-08-02 DIAGNOSIS — R33.9 RETENTION OF URINE, UNSPECIFIED: ICD-10-CM

## 2021-08-02 LAB
ALBUMIN SERPL ELPH-MCNC: 2.4 G/DL — LOW (ref 3.3–5.2)
ALP SERPL-CCNC: 78 U/L — SIGNIFICANT CHANGE UP (ref 40–120)
ALT FLD-CCNC: 14 U/L — SIGNIFICANT CHANGE UP
ANION GAP SERPL CALC-SCNC: 10 MMOL/L — SIGNIFICANT CHANGE UP (ref 5–17)
APPEARANCE UR: CLEAR — SIGNIFICANT CHANGE UP
AST SERPL-CCNC: 65 U/L — HIGH
BACTERIA # UR AUTO: NEGATIVE — SIGNIFICANT CHANGE UP
BILIRUB SERPL-MCNC: 1 MG/DL — SIGNIFICANT CHANGE UP (ref 0.4–2)
BILIRUB UR-MCNC: NEGATIVE — SIGNIFICANT CHANGE UP
BUN SERPL-MCNC: 46 MG/DL — HIGH (ref 8–20)
CALCIUM SERPL-MCNC: 7.9 MG/DL — LOW (ref 8.6–10.2)
CHLORIDE SERPL-SCNC: 112 MMOL/L — HIGH (ref 98–107)
CO2 SERPL-SCNC: 28 MMOL/L — SIGNIFICANT CHANGE UP (ref 22–29)
COLOR SPEC: YELLOW — SIGNIFICANT CHANGE UP
CREAT SERPL-MCNC: 1.68 MG/DL — HIGH (ref 0.5–1.3)
DIFF PNL FLD: ABNORMAL
EPI CELLS # UR: SIGNIFICANT CHANGE UP
GLUCOSE SERPL-MCNC: 107 MG/DL — HIGH (ref 70–99)
GLUCOSE UR QL: NEGATIVE MG/DL — SIGNIFICANT CHANGE UP
HCT VFR BLD CALC: 46.1 % — SIGNIFICANT CHANGE UP (ref 39–50)
HGB BLD-MCNC: 14.9 G/DL — SIGNIFICANT CHANGE UP (ref 13–17)
KETONES UR-MCNC: NEGATIVE — SIGNIFICANT CHANGE UP
LEUKOCYTE ESTERASE UR-ACNC: NEGATIVE — SIGNIFICANT CHANGE UP
MAGNESIUM SERPL-MCNC: 2 MG/DL — SIGNIFICANT CHANGE UP (ref 1.6–2.6)
MCHC RBC-ENTMCNC: 29.7 PG — SIGNIFICANT CHANGE UP (ref 27–34)
MCHC RBC-ENTMCNC: 32.3 GM/DL — SIGNIFICANT CHANGE UP (ref 32–36)
MCV RBC AUTO: 91.8 FL — SIGNIFICANT CHANGE UP (ref 80–100)
NITRITE UR-MCNC: NEGATIVE — SIGNIFICANT CHANGE UP
PH UR: 5 — SIGNIFICANT CHANGE UP (ref 5–8)
PHOSPHATE SERPL-MCNC: 2.4 MG/DL — SIGNIFICANT CHANGE UP (ref 2.4–4.7)
PLATELET # BLD AUTO: 101 K/UL — LOW (ref 150–400)
POTASSIUM SERPL-MCNC: 3 MMOL/L — LOW (ref 3.5–5.3)
POTASSIUM SERPL-SCNC: 3 MMOL/L — LOW (ref 3.5–5.3)
PROT SERPL-MCNC: 5.7 G/DL — LOW (ref 6.6–8.7)
PROT UR-MCNC: 15 MG/DL
RBC # BLD: 5.02 M/UL — SIGNIFICANT CHANGE UP (ref 4.2–5.8)
RBC # FLD: 13.4 % — SIGNIFICANT CHANGE UP (ref 10.3–14.5)
RBC CASTS # UR COMP ASSIST: SIGNIFICANT CHANGE UP /HPF (ref 0–4)
SODIUM SERPL-SCNC: 149 MMOL/L — HIGH (ref 135–145)
SP GR SPEC: 1.01 — SIGNIFICANT CHANGE UP (ref 1.01–1.02)
UROBILINOGEN FLD QL: NEGATIVE MG/DL — SIGNIFICANT CHANGE UP
WBC # BLD: 14.8 K/UL — HIGH (ref 3.8–10.5)
WBC # FLD AUTO: 14.8 K/UL — HIGH (ref 3.8–10.5)
WBC UR QL: SIGNIFICANT CHANGE UP

## 2021-08-02 PROCEDURE — 99232 SBSQ HOSP IP/OBS MODERATE 35: CPT

## 2021-08-02 PROCEDURE — 99223 1ST HOSP IP/OBS HIGH 75: CPT

## 2021-08-02 RX ORDER — POTASSIUM CHLORIDE 20 MEQ
40 PACKET (EA) ORAL ONCE
Refills: 0 | Status: COMPLETED | OUTPATIENT
Start: 2021-08-02 | End: 2021-08-02

## 2021-08-02 RX ORDER — SODIUM CHLORIDE 9 MG/ML
1000 INJECTION, SOLUTION INTRAVENOUS
Refills: 0 | Status: DISCONTINUED | OUTPATIENT
Start: 2021-08-02 | End: 2021-08-04

## 2021-08-02 RX ADMIN — Medication 1 APPLICATION(S): at 12:25

## 2021-08-02 RX ADMIN — Medication 1 APPLICATION(S): at 05:37

## 2021-08-02 RX ADMIN — TAMSULOSIN HYDROCHLORIDE 0.8 MILLIGRAM(S): 0.4 CAPSULE ORAL at 21:07

## 2021-08-02 RX ADMIN — DONEPEZIL HYDROCHLORIDE 10 MILLIGRAM(S): 10 TABLET, FILM COATED ORAL at 21:07

## 2021-08-02 RX ADMIN — SODIUM CHLORIDE 82 MILLILITER(S): 9 INJECTION, SOLUTION INTRAVENOUS at 17:32

## 2021-08-02 RX ADMIN — Medication 1 APPLICATION(S): at 17:32

## 2021-08-02 RX ADMIN — Medication 1 TABLET(S): at 05:38

## 2021-08-02 RX ADMIN — PIPERACILLIN AND TAZOBACTAM 25 GRAM(S): 4; .5 INJECTION, POWDER, LYOPHILIZED, FOR SOLUTION INTRAVENOUS at 17:32

## 2021-08-02 RX ADMIN — SIMVASTATIN 20 MILLIGRAM(S): 20 TABLET, FILM COATED ORAL at 21:07

## 2021-08-02 RX ADMIN — Medication 1 TABLET(S): at 17:32

## 2021-08-02 RX ADMIN — PIPERACILLIN AND TAZOBACTAM 25 GRAM(S): 4; .5 INJECTION, POWDER, LYOPHILIZED, FOR SOLUTION INTRAVENOUS at 05:37

## 2021-08-02 RX ADMIN — HEPARIN SODIUM 5000 UNIT(S): 5000 INJECTION INTRAVENOUS; SUBCUTANEOUS at 05:37

## 2021-08-02 RX ADMIN — HEPARIN SODIUM 5000 UNIT(S): 5000 INJECTION INTRAVENOUS; SUBCUTANEOUS at 17:33

## 2021-08-02 RX ADMIN — Medication 40 MILLIEQUIVALENT(S): at 15:38

## 2021-08-02 NOTE — PHYSICAL THERAPY INITIAL EVALUATION ADULT - PERTINENT HX OF CURRENT PROBLEM, REHAB EVAL
Patient is a 89 yo male with hx of dementia , htn, bph, presents to the ED from home for right buttock wound. Patient disoriented at baseline. Daughter states that he often shifts himself in bed causing skin tear on left buttock that is now worse. Bleeds often. Patient does not give any history. Admitted with leukocytosis and TRICIA

## 2021-08-02 NOTE — PROGRESS NOTE ADULT - ASSESSMENT
89 yo male with hx of dementia , htn, bph,   presents to the ED for right buttock wound    TRICIA suspect 2/2 hypoperfusion  Serum cr 1.8--> 1.9 --> 1.6  Cont IVF gentle  HyperNatremia will make IVF somewhat hypotonic  MA improved bicarb removed from IVF  Hypokalemia will add to IVF  Buttock wound Leukocytosis  Abx noted renal dose Zosyn, ID following   Renal sono noted no hydronephrosis  CXR noted clear    AM labs, will follow

## 2021-08-02 NOTE — PROGRESS NOTE ADULT - PROBLEM SELECTOR PLAN 3
- Left buttock area with about 5 cm x 5cm skin tear with some serosanguinous discharge  - F/u wound care recs- c/w gauze wrap and bacitracin - Left buttock area with about 5 cm x 5cm skin tear. Pressure ulcer w/ eschar, no purulent drainage  - F/u wound care recs- c/w gauze wrap and bacitracin

## 2021-08-02 NOTE — PROGRESS NOTE ADULT - PROBLEM SELECTOR PLAN 6
- DVT ppx- Heparin ppx  - Diet- Dysphagia 1 pureed nectar  - Dispo- . PT recs- home w/ assist. Daughter is requesting home hospice referral.   - CODE STATUS- DNR/DNI    - Updated patient's daughter, Clarissa on 8/2    Pending hospice referral given patient is bedbound, has dysphagia, decubitus ulcer and has been declining.

## 2021-08-02 NOTE — PROGRESS NOTE ADULT - PROBLEM SELECTOR PLAN 1
- Possibly 2/2 RT buttock wound. Now downtrending.  - Will empirically treat with IV Zosyn   - F/u CT chest to r/o PNA- Questionable small foci of infiltrate in the right upper lobe posteriorly and in the posterior right lower lobe  - F/u blood cx  - ID c/s placed. Will follow up on recommendations - Possibly 2/2 RT buttock wound. Now downtrending.  - Will empirically treat with IV Zosyn   - F/u CT chest to r/o PNA- Questionable small foci of infiltrate in the right upper lobe posteriorly and in the posterior right lower lobe  - F/u blood cx  - Appreciate ID recs- will c/w IV Zosyn

## 2021-08-02 NOTE — CONSULT NOTE ADULT - SUBJECTIVE AND OBJECTIVE BOX
INFECTIOUS DISEASES AND INTERNAL MEDICINE at [place]  =======================================================  Aden Joel MD  Diplomates American Board of Internal Medicine and Infectious Diseases  Telephone [phone]  Fax            [phone]  =======================================================    MARISA [id number]      HPI:  pt. is a 89 yo male with hx of dementia , htn, bph, presents to the ED for right buttock wound. Patient disoriented at baseline. Daughter states that he often shifts himself in bed causing skin tear on left buttock that is now worse. Bleeds often. pt. does not give any history. no fever, chills, cough, sick contacts , n/v/d per history. Patient borderline hypotensive on arrival, no fever. pt's cbc showed wbc 20.98, no fever, no bandemia , no obvious source for elevated wbc.  (   PT WITH LEUKOCYTOSIS ALTHOUGH TRENDING DOWNWARD   ASKED TO EVALUATE FROM ID STANDPOINT       PAST MEDICAL & SURGICAL HISTORY:  Reflux    Anginal pain    Coronary artery disease    Enlarged prostate    Heart attack    TIA (transient ischemic attack)    Cataract    Stented coronary artery  x1    BPH (benign prostatic hypertrophy)    Hypothyroidism    Dementia    Alcohol abuse    S/P appendectomy    H/O: hemorrhoidectomy    H/O pilonidal cyst    Coronary artery disease  cardiac stents 2011    H/O cataract removal with insertion of prosthetic lens    Elective surgery  pt had bladder mass removed apporoximayly 6 weeks ago by Dr. cMcord        ANTIBIOTICS  piperacillin/tazobactam IVPB.. 3.375 Gram(s) IV Intermittent every 12 hours      Allergies    No Known Allergies    Intolerances        SOCIAL HISTORY:     FAMILY HX   FAMILY HISTORY:  Family history of hypertension  sibling        Vital Signs Last 24 Hrs  T(C): 36.7 (02 Aug 2021 08:21), Max: 37.1 (01 Aug 2021 20:20)  T(F): 98 (02 Aug 2021 08:21), Max: 98.8 (01 Aug 2021 20:20)  HR: 65 (02 Aug 2021 08:21) (60 - 71)  BP: 91/71 (02 Aug 2021 08:21) (91/71 - 110/49)  BP(mean): --  RR: 18 (02 Aug 2021 08:21) (17 - 18)  SpO2: 98% (02 Aug 2021 08:21) (93% - 98%)  Drug Dosing Weight  Height (cm): 193 (30 Jul 2021 14:32)  Weight (kg): 80.7 (30 Jul 2021 14:32)  BMI (kg/m2): 21.7 (30 Jul 2021 14:32)  BSA (m2): 2.11 (30 Jul 2021 14:32)      REVIEW OF SYSTEMS:    CONSTITUTIONAL:  As per HPI.    HEENT:  Eyes:  No diplopia or blurred vision. ENT:  No earache, sore throat or runny nose.    CARDIOVASCULAR:  No pressure, squeezing, strangling, tightness, heaviness or aching about the chest, neck, axilla or epigastrium.    RESPIRATORY:  No cough, shortness of breath, PND or orthopnea.    GASTROINTESTINAL:  No nausea, vomiting or diarrhea.    GENITOURINARY:  No dysuria, frequency or urgency.    MUSCULOSKELETAL:  As per HPI.    SKIN:  No change in skin, hair or nails.    NEUROLOGIC:  No paresthesias, fasciculations, seizures or weakness.                  PHYSICAL EXAMINATION:    GENERAL: The patient is a well-developed, well-nourished _____in no apparent distress. ___ is alert and oriented x3.    VITAL SIGNS: T(C): 36.7 (08-02-21 @ 08:21), Max: 37.1 (08-01-21 @ 20:20)  HR: 65 (08-02-21 @ 08:21) (60 - 71)  BP: 91/71 (08-02-21 @ 08:21) (91/71 - 110/49)  RR: 18 (08-02-21 @ 08:21) (17 - 18)  SpO2: 98% (08-02-21 @ 08:21) (93% - 98%)  Wt(kg): --    HEENT: Head is normocephalic and atraumatic.  ANICTERIC  NECK: Supple. No carotid bruits.  No lymphadenopathy or thyromegaly.    LUNGS:COARSE BREATH SOUNDS    HEART: Regular rate and rhythm without murmur.    ABDOMEN: Soft, nontender, and nondistended.  Positive bowel sounds.  No hepatosplenomegaly was noted. NO REBOUND NO GUARDING    EXTREMITIES: NO EDEMA NO ERYTHEMA    NEUROLOGIC: NON FOCAL      SKIN: No ulceration or induration present. NO RASH        BLOOD CULTURES  Culture Results:   <10,000 CFU/mL Normal Urogenital Samira (07-31 @ 01:22)  Culture Results:   No growth at 48 hours (07-30 @ 16:34)  Culture Results:   No growth at 48 hours (07-30 @ 16:33)       URINE CX          LABS:                        14.9   14.80 )-----------( 101      ( 02 Aug 2021 07:58 )             46.1     08-02    149<H>  |  112<H>  |  46.0<H>  ----------------------------<  107<H>  3.0<L>   |  28.0  |  1.68<H>    Ca    7.9<L>      02 Aug 2021 07:58  Phos  2.4     08-02  Mg     2.0     08-02    TPro  5.7<L>  /  Alb  2.4<L>  /  TBili  1.0  /  DBili  x   /  AST  65<H>  /  ALT  14  /  AlkPhos  78  08-02          RADIOLOGY & ADDITIONAL STUDIES:      ASSESSMENT/PLAN     a 89 yo male with hx of dementia , htn, bph, presents to the ED for right buttock wound. Patient disoriented at baseline. Daughter states that he often shifts himself in bed causing skin tear on left buttock that is now worse. Bleeds often. pt. does not give any history. no fever, chills, cough, sick contacts , n/v/d per history. Patient borderline hypotensive on arrival, no fever. pt's cbc showed wbc 20.98, no fever, no bandemia , no obvious source for elevated wbc.  (   PT WITH LEUKOCYTOSIS ALTHOUGH TRENDING DOWNWARD   OVERALL UNABLE TO GET HISTORY FROM PT AS HE HAS DEMENTIA  PHYSICAL EXAM WITH PRESSURE ULCER LEFT BUTTOCKS WITH ESCHAR NO ODOR OR DRAINAGE  PT CURRENTLY ON ZOSYN WITH IMPROVEMENT IN WBC WILL CONTINUE FOR NOW  CX ARE NEGATIVE                  FLORY BEACH MD

## 2021-08-02 NOTE — PROGRESS NOTE ADULT - PROBLEM SELECTOR PLAN 2
- Likely 2/2 hypoperfusion  - F/u US kidneys to r/o obstruction- No gross hydronephrosis. LT renal cyst.   - appreciate nephro recs- C/w D51/2NS w/ sodium bicarb - patient with urinary retention, now requiring vidales  - will c/w flomax

## 2021-08-02 NOTE — PROGRESS NOTE ADULT - PROBLEM SELECTOR PLAN 4
- C/w donepezil - Likely 2/2 hypoperfusion  - US kidneys to r/o obstruction- No gross hydronephrosis. LT renal cyst.   - appreciate nephro recs- C/w D51/2NS w/ sodium bicarb

## 2021-08-02 NOTE — ADVANCED PRACTICE NURSE CONSULT - ASSESSMENT
Patient is A&O to person with documented history of fecal and urinary incontinence.  ED notes and H&P describe a skin tear to Left buttock that has been aggravated due to patient shifting self in bed prior to admission.  ED RN note indicates wounds to Left Coccyx area (open wound) and that patient is here for treatment.  Current Jason Score of 12.  Wound RN and Primary RN at bedside for site assessment.    ·	Right Buttock - Skin Tear (1cm x 1.2cm) full loss of epidermal flap, partial thickness, pink, red friable wound bed, ill-defined borders with minimal serosanguinous drainage noted.  Periwound area erythema that blanches, dry and intact.      ·	Right Hip/Ischium - Abrasion/Stage I PI (4.5cm x 7cm) - nonblanching redness with bruising noted with a area of Unstageable PI (0.5cm x 0.5cm) (black moist eschar, grey slough (80% of wound bed) with red tissue (20%), minimal serosanguinous drainage noted    ·	Left Buttock - DTI within Stage II PI (4.75cm x 4cm) - black necrotic tissue noted at center of wound with pink, red friable tissue surrounding.  Moderate seropurulent drainage noted.  Periwound with erythema, blanching redness    ·	Lower Spine/Upper Sacrum - DTI - (3cm x 3cm) persistent purple discoloration, nonblanching with fragile, dry intact skin

## 2021-08-02 NOTE — PHYSICAL THERAPY INITIAL EVALUATION ADULT - RANGE OF MOTION EXAMINATION, REHAB EVAL
Bilateral LE contracted, hip/knees flexed. Pt refusing to let this writer further assess his legs. Pt stating "No, do not touch me"

## 2021-08-02 NOTE — PROGRESS NOTE ADULT - PROBLEM SELECTOR PLAN 5
- DVT ppx- Heparin ppx  - Diet- Dysphagia 1 pureed nectar  - Dispo- Pending infectious workup    - Updated patient's daughter, Clarissa on 8/1    Patient is actively acute, no plan for discharge at this time pending clinical course. Patient is at high risk at the moment due to infectious workup and PT recs. - C/w donepezil

## 2021-08-02 NOTE — PROGRESS NOTE ADULT - ASSESSMENT
Patient is a 89 yo male with hx of dementia , htn, bph, presents to the ED from home for right buttock wound. Patient disoriented at baseline. Daughter states that he often shifts himself in bed causing skin tear on left buttock that is now worse. Bleeds often. Patient does not give any history. no fever, chills, cough, sick contacts, n/v/d per history. Patient borderline hypotensive on arrival, no fever. pt's cbc showed wbc 20.98, no fever, no bandemia, no obvious source for elevated wbc. No pending infectious workup. Patient is a 89 yo male with hx of dementia , htn, bph, presents to the ED from home for right buttock wound. Patient disoriented at baseline. Daughter states that he often shifts himself in bed causing skin tear on left buttock that is now worse. Bleeds often. Patient does not give any history. no fever, chills, cough, sick contacts, n/v/d per history. Patient borderline hypotensive on arrival, no fever. pt's cbc showed wbc 20.98, no fever, no bandemia, no obvious source for elevated wbc. Pending home hospice referral.

## 2021-08-02 NOTE — PHYSICAL THERAPY INITIAL EVALUATION ADULT - DIAGNOSIS, PT EVAL
Pt is confused, not following consistent commands, refusing PT to provide assist for mobility, therefore, not appropriate for acute PT services at this time

## 2021-08-02 NOTE — ADVANCED PRACTICE NURSE CONSULT - RECOMMEDATIONS
·	Left Buttock Unstageable - cleanse with normal saline, Santyl daily  ·	Right Hip Unstageable - cleanse with normal saline, Santyl daily  ·	Right Buttock Skin Tear - Cavilon Advanced Q3 days   ·	Turn and Position Q2H while in bed  ·	Offload patient back to alternating side when repositioning with Z-jacqueline pillow or wedge  ·	Nutritional Consult (Elvis consideration)  ·	Clinitron bed

## 2021-08-02 NOTE — PHYSICAL THERAPY INITIAL EVALUATION ADULT - ADDITIONAL COMMENTS
Pt is poor historian, confused. States he lives with his mother and year is 1847.  As per chart, pt lives with daughter and is primarily bedbound.

## 2021-08-02 NOTE — PROGRESS NOTE ADULT - SUBJECTIVE AND OBJECTIVE BOX
[facility] Division of Hospital Medicine    Chief Complaint:  Leukocytosis and TRICIA    SUBJECTIVE / OVERNIGHT EVENTS: No acute events overnight. HD stable.     Patient denies chest pain, SOB, abd pain, N/V, fever, chills, dysuria or any other complaints. All remainder ROS negative.     MEDICATIONS  (STANDING):  BACItracin   Ointment 1 Application(s) Topical two times a day  BACItracin   Ointment 1 Application(s) Topical daily  dextrose 5% + sodium chloride 0.45% 1000 milliLiter(s) (75 mL/Hr) IV Continuous <Continuous>  donepezil 10 milliGRAM(s) Oral at bedtime  heparin   Injectable 5000 Unit(s) SubCutaneous every 12 hours  lactobacillus acidophilus 1 Tablet(s) Oral two times a day  piperacillin/tazobactam IVPB.. 3.375 Gram(s) IV Intermittent every 12 hours  simvastatin 20 milliGRAM(s) Oral at bedtime  tamsulosin 0.8 milliGRAM(s) Oral at bedtime    MEDICATIONS  (PRN):  QUEtiapine 25 milliGRAM(s) Oral at bedtime PRN agitation  senna 2 Tablet(s) Oral at bedtime PRN Constipation        I&O's Summary    01 Aug 2021 07:01  -  02 Aug 2021 07:00  --------------------------------------------------------  IN: 750 mL / OUT: 1800 mL / NET: -1050 mL        PHYSICAL EXAM:  Vital Signs Last 24 Hrs  T(C): 36.7 (02 Aug 2021 08:21), Max: 37.1 (01 Aug 2021 20:20)  T(F): 98 (02 Aug 2021 08:21), Max: 98.8 (01 Aug 2021 20:20)  HR: 65 (02 Aug 2021 08:21) (61 - 71)  BP: 91/71 (02 Aug 2021 08:21) (91/71 - 103/71)  BP(mean): --  RR: 18 (02 Aug 2021 08:21) (17 - 18)  SpO2: 98% (02 Aug 2021 08:21) (93% - 98%)        CONSTITUTIONAL: NAD, well-developed, well-groomed  ENMT: Moist oral mucosa, no pharyngeal injection or exudates; normal dentition  RESPIRATORY: Normal respiratory effort; lungs are clear to auscultation bilaterally  CARDIOVASCULAR: Regular rate and rhythm, normal S1 and S2, no murmur/rub/gallop; No lower extremity edema; Peripheral pulses are 2+ bilaterally  ABDOMEN: Nontender to palpation, normoactive bowel sounds, no rebound/guarding; No hepatosplenomegaly  MUSCLOSKELETAL:  Normal gait; no clubbing or cyanosis of digits; no joint swelling or tenderness to palpation  PSYCH: A+O to person, place, and time; affect appropriate  NEUROLOGY: CN 2-12 are intact and symmetric; no gross sensory deficits;   SKIN: No rashes; no palpable lesions    LABS:                        14.9   14.80 )-----------( 101      ( 02 Aug 2021 07:58 )             46.1     08-02    149<H>  |  112<H>  |  46.0<H>  ----------------------------<  107<H>  3.0<L>   |  28.0  |  1.68<H>    Ca    7.9<L>      02 Aug 2021 07:58  Phos  2.4     08-02  Mg     2.0     08-02    TPro  5.7<L>  /  Alb  2.4<L>  /  TBili  1.0  /  DBili  x   /  AST  65<H>  /  ALT  14  /  AlkPhos  78  08-02          Urinalysis Basic - ( 02 Aug 2021 09:37 )    Color: Yellow / Appearance: Clear / S.010 / pH: x  Gluc: x / Ketone: Negative  / Bili: Negative / Urobili: Negative mg/dL   Blood: x / Protein: 15 mg/dL / Nitrite: Negative   Leuk Esterase: Negative / RBC: 0-2 /HPF / WBC 0-2   Sq Epi: x / Non Sq Epi: Occasional / Bacteria: Negative        Culture - Urine (collected 2021 01:22)  Source: Catheterized Catheterized  Final Report (2021 22:17):    <10,000 CFU/mL Normal Urogenital Samira    Culture - Blood (collected 2021 16:34)  Source: .Blood Blood-Venous  Preliminary Report (01 Aug 2021 17:01):    No growth at 48 hours    Culture - Blood (collected 2021 16:33)  Source: .Blood Blood-Venous  Preliminary Report (01 Aug 2021 17:01):    No growth at 48 hours      CAPILLARY BLOOD GLUCOSE            RADIOLOGY & ADDITIONAL TESTS:  Results Reviewed:   Imaging Personally Reviewed:  Electrocardiogram Personally Reviewed:                                           [facility] Division of Hospital Medicine    Chief Complaint:  Leukocytosis and TRICIA    SUBJECTIVE / OVERNIGHT EVENTS: No acute events overnight. HD stable.     Patient denies chest pain, SOB, abd pain, N/V, fever, chills, dysuria or any other complaints. All remainder ROS negative.     MEDICATIONS  (STANDING):  BACItracin   Ointment 1 Application(s) Topical two times a day  BACItracin   Ointment 1 Application(s) Topical daily  dextrose 5% + sodium chloride 0.45% 1000 milliLiter(s) (75 mL/Hr) IV Continuous <Continuous>  donepezil 10 milliGRAM(s) Oral at bedtime  heparin   Injectable 5000 Unit(s) SubCutaneous every 12 hours  lactobacillus acidophilus 1 Tablet(s) Oral two times a day  piperacillin/tazobactam IVPB.. 3.375 Gram(s) IV Intermittent every 12 hours  simvastatin 20 milliGRAM(s) Oral at bedtime  tamsulosin 0.8 milliGRAM(s) Oral at bedtime    MEDICATIONS  (PRN):  QUEtiapine 25 milliGRAM(s) Oral at bedtime PRN agitation  senna 2 Tablet(s) Oral at bedtime PRN Constipation        I&O's Summary    01 Aug 2021 07:01  -  02 Aug 2021 07:00  --------------------------------------------------------  IN: 750 mL / OUT: 1800 mL / NET: -1050 mL        PHYSICAL EXAM:  Vital Signs Last 24 Hrs  T(C): 36.7 (02 Aug 2021 08:21), Max: 37.1 (01 Aug 2021 20:20)  T(F): 98 (02 Aug 2021 08:21), Max: 98.8 (01 Aug 2021 20:20)  HR: 65 (02 Aug 2021 08:21) (61 - 71)  BP: 91/71 (02 Aug 2021 08:21) (91/71 - 103/71)  BP(mean): --  RR: 18 (02 Aug 2021 08:21) (17 - 18)  SpO2: 98% (02 Aug 2021 08:21) (93% - 98%)        CONSTITUTIONAL: NAD, resting comfortably  ENMT: Moist oral mucosa, no pharyngeal injection or exudates; normal dentition  RESPIRATORY: Normal respiratory effort; lungs are clear to auscultation bilaterally  CARDIOVASCULAR: Regular rate and rhythm, normal S1 and S2, no murmur/rub/gallop; No lower extremity edema; Peripheral pulses are 2+ bilaterally  ABDOMEN: Nontender to palpation, normoactive bowel sounds, no rebound/guarding  PSYCH: AxO x1 (name), speech clear, moving all extremities  NEUROLOGY: CN 2-12 are intact and symmetric; no gross sensory deficits;   SKIN: left buttock wound covered in dressing, no drainage appreciated      LABS:                        14.9   14.80 )-----------( 101      ( 02 Aug 2021 07:58 )             46.1     08-02    149<H>  |  112<H>  |  46.0<H>  ----------------------------<  107<H>  3.0<L>   |  28.0  |  1.68<H>    Ca    7.9<L>      02 Aug 2021 07:58  Phos  2.4     08-02  Mg     2.0     08-02    TPro  5.7<L>  /  Alb  2.4<L>  /  TBili  1.0  /  DBili  x   /  AST  65<H>  /  ALT  14  /  AlkPhos  78  08-02          Urinalysis Basic - ( 02 Aug 2021 09:37 )    Color: Yellow / Appearance: Clear / S.010 / pH: x  Gluc: x / Ketone: Negative  / Bili: Negative / Urobili: Negative mg/dL   Blood: x / Protein: 15 mg/dL / Nitrite: Negative   Leuk Esterase: Negative / RBC: 0-2 /HPF / WBC 0-2   Sq Epi: x / Non Sq Epi: Occasional / Bacteria: Negative        Culture - Urine (collected 2021 01:22)  Source: Catheterized Catheterized  Final Report (2021 22:17):    <10,000 CFU/mL Normal Urogenital Samira    Culture - Blood (collected 2021 16:34)  Source: .Blood Blood-Venous  Preliminary Report (01 Aug 2021 17:01):    No growth at 48 hours    Culture - Blood (collected 2021 16:33)  Source: .Blood Blood-Venous  Preliminary Report (01 Aug 2021 17:01):    No growth at 48 hours      CAPILLARY BLOOD GLUCOSE            RADIOLOGY & ADDITIONAL TESTS:  Results Reviewed:   Imaging Personally Reviewed:  Electrocardiogram Personally Reviewed:

## 2021-08-02 NOTE — PROGRESS NOTE ADULT - SUBJECTIVE AND OBJECTIVE BOX
NEPHROLOGY INTERVAL HPI/OVERNIGHT EVENTS:    Examined  Frail elderly  No distress  Rubin leon UOP    MEDICATIONS  (STANDING):  BACItracin   Ointment 1 Application(s) Topical two times a day  BACItracin   Ointment 1 Application(s) Topical daily  dextrose 5% + sodium chloride 0.45% 1000 milliLiter(s) (75 mL/Hr) IV Continuous <Continuous>  donepezil 10 milliGRAM(s) Oral at bedtime  heparin   Injectable 5000 Unit(s) SubCutaneous every 12 hours  lactobacillus acidophilus 1 Tablet(s) Oral two times a day  piperacillin/tazobactam IVPB.. 3.375 Gram(s) IV Intermittent every 12 hours  simvastatin 20 milliGRAM(s) Oral at bedtime  tamsulosin 0.8 milliGRAM(s) Oral at bedtime    MEDICATIONS  (PRN):  QUEtiapine 25 milliGRAM(s) Oral at bedtime PRN agitation  senna 2 Tablet(s) Oral at bedtime PRN Constipation      Allergies    No Known Allergies    Intolerances        Vital Signs Last 24 Hrs  T(C): 36.5 (02 Aug 2021 15:32), Max: 37.1 (01 Aug 2021 20:20)  T(F): 97.7 (02 Aug 2021 15:32), Max: 98.8 (01 Aug 2021 20:20)  HR: 62 (02 Aug 2021 15:32) (61 - 71)  BP: 99/66 (02 Aug 2021 15:32) (91/71 - 103/71)  BP(mean): --  RR: 18 (02 Aug 2021 15:32) (17 - 18)  SpO2: 96% (02 Aug 2021 15:32) (93% - 98%)  Daily     Daily     PHYSICAL EXAM:  GENERAL: elderly/ frail  HEAD: NCAT  EYES: EOMI  NECK: Supple  NERVOUS SYSTEM: Awake   CHEST/LUNG: dec bs B/L  HEART: Regular rate and rhythm  ABDOMEN: Soft, Nontender, Nondistended; +BS  EXTREMITIES:   no edema    LABS:                        14.9   14.80 )-----------( 101      ( 02 Aug 2021 07:58 )             46.1     08-02    149<H>  |  112<H>  |  46.0<H>  ----------------------------<  107<H>  3.0<L>   |  28.0  |  1.68<H>    Ca    7.9<L>      02 Aug 2021 07:58  Phos  2.4       Mg     2.0         TPro  5.7<L>  /  Alb  2.4<L>  /  TBili  1.0  /  DBili  x   /  AST  65<H>  /  ALT  14  /  AlkPhos  78        Urinalysis Basic - ( 02 Aug 2021 09:37 )    Color: Yellow / Appearance: Clear / S.010 / pH: x  Gluc: x / Ketone: Negative  / Bili: Negative / Urobili: Negative mg/dL   Blood: x / Protein: 15 mg/dL / Nitrite: Negative   Leuk Esterase: Negative / RBC: 0-2 /HPF / WBC 0-2   Sq Epi: x / Non Sq Epi: Occasional / Bacteria: Negative      Magnesium, Serum: 2.0 mg/dL ( @ 07:58)  Phosphorus Level, Serum: 2.4 mg/dL ( @ 07:58)          RADIOLOGY & ADDITIONAL TESTS:

## 2021-08-03 ENCOUNTER — TRANSCRIPTION ENCOUNTER (OUTPATIENT)
Age: 86
End: 2021-08-03

## 2021-08-03 LAB
ALBUMIN SERPL ELPH-MCNC: 1.9 G/DL — LOW (ref 3.3–5.2)
ALP SERPL-CCNC: 71 U/L — SIGNIFICANT CHANGE UP (ref 40–120)
ALT FLD-CCNC: 19 U/L — SIGNIFICANT CHANGE UP
ANION GAP SERPL CALC-SCNC: 11 MMOL/L — SIGNIFICANT CHANGE UP (ref 5–17)
AST SERPL-CCNC: 75 U/L — HIGH
BILIRUB SERPL-MCNC: 0.9 MG/DL — SIGNIFICANT CHANGE UP (ref 0.4–2)
BUN SERPL-MCNC: 35.2 MG/DL — HIGH (ref 8–20)
CALCIUM SERPL-MCNC: 7.6 MG/DL — LOW (ref 8.6–10.2)
CHLORIDE SERPL-SCNC: 112 MMOL/L — HIGH (ref 98–107)
CO2 SERPL-SCNC: 22 MMOL/L — SIGNIFICANT CHANGE UP (ref 22–29)
CREAT SERPL-MCNC: 1.49 MG/DL — HIGH (ref 0.5–1.3)
CULTURE RESULTS: NO GROWTH — SIGNIFICANT CHANGE UP
GLUCOSE SERPL-MCNC: 138 MG/DL — HIGH (ref 70–99)
HCT VFR BLD CALC: 43.3 % — SIGNIFICANT CHANGE UP (ref 39–50)
HGB BLD-MCNC: 14.3 G/DL — SIGNIFICANT CHANGE UP (ref 13–17)
MAGNESIUM SERPL-MCNC: 1.8 MG/DL — SIGNIFICANT CHANGE UP (ref 1.6–2.6)
MCHC RBC-ENTMCNC: 30 PG — SIGNIFICANT CHANGE UP (ref 27–34)
MCHC RBC-ENTMCNC: 33 GM/DL — SIGNIFICANT CHANGE UP (ref 32–36)
MCV RBC AUTO: 90.8 FL — SIGNIFICANT CHANGE UP (ref 80–100)
PHOSPHATE SERPL-MCNC: 2.2 MG/DL — LOW (ref 2.4–4.7)
PLATELET # BLD AUTO: 101 K/UL — LOW (ref 150–400)
POTASSIUM SERPL-MCNC: 3.8 MMOL/L — SIGNIFICANT CHANGE UP (ref 3.5–5.3)
POTASSIUM SERPL-SCNC: 3.8 MMOL/L — SIGNIFICANT CHANGE UP (ref 3.5–5.3)
PROT SERPL-MCNC: 5.1 G/DL — LOW (ref 6.6–8.7)
RBC # BLD: 4.77 M/UL — SIGNIFICANT CHANGE UP (ref 4.2–5.8)
RBC # FLD: 13.4 % — SIGNIFICANT CHANGE UP (ref 10.3–14.5)
SODIUM SERPL-SCNC: 145 MMOL/L — SIGNIFICANT CHANGE UP (ref 135–145)
SPECIMEN SOURCE: SIGNIFICANT CHANGE UP
WBC # BLD: 14.19 K/UL — HIGH (ref 3.8–10.5)
WBC # FLD AUTO: 14.19 K/UL — HIGH (ref 3.8–10.5)

## 2021-08-03 PROCEDURE — 99232 SBSQ HOSP IP/OBS MODERATE 35: CPT

## 2021-08-03 RX ADMIN — Medication 1 TABLET(S): at 05:59

## 2021-08-03 RX ADMIN — TAMSULOSIN HYDROCHLORIDE 0.8 MILLIGRAM(S): 0.4 CAPSULE ORAL at 21:23

## 2021-08-03 RX ADMIN — HEPARIN SODIUM 5000 UNIT(S): 5000 INJECTION INTRAVENOUS; SUBCUTANEOUS at 05:59

## 2021-08-03 RX ADMIN — Medication 1 APPLICATION(S): at 11:03

## 2021-08-03 RX ADMIN — DONEPEZIL HYDROCHLORIDE 10 MILLIGRAM(S): 10 TABLET, FILM COATED ORAL at 21:23

## 2021-08-03 RX ADMIN — PIPERACILLIN AND TAZOBACTAM 25 GRAM(S): 4; .5 INJECTION, POWDER, LYOPHILIZED, FOR SOLUTION INTRAVENOUS at 05:59

## 2021-08-03 RX ADMIN — Medication 1 TABLET(S): at 17:45

## 2021-08-03 RX ADMIN — SIMVASTATIN 20 MILLIGRAM(S): 20 TABLET, FILM COATED ORAL at 21:23

## 2021-08-03 RX ADMIN — HEPARIN SODIUM 5000 UNIT(S): 5000 INJECTION INTRAVENOUS; SUBCUTANEOUS at 17:45

## 2021-08-03 RX ADMIN — PIPERACILLIN AND TAZOBACTAM 25 GRAM(S): 4; .5 INJECTION, POWDER, LYOPHILIZED, FOR SOLUTION INTRAVENOUS at 17:47

## 2021-08-03 RX ADMIN — Medication 1 APPLICATION(S): at 05:59

## 2021-08-03 RX ADMIN — SODIUM CHLORIDE 82 MILLILITER(S): 9 INJECTION, SOLUTION INTRAVENOUS at 06:00

## 2021-08-03 RX ADMIN — Medication 1 APPLICATION(S): at 17:45
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
## 2021-08-03 NOTE — DISCHARGE NOTE PROVIDER - CARE PROVIDERS DIRECT ADDRESSES
,DirectAddress_Unknown,[email or web address],DirectAddress_Unknown

## 2021-08-03 NOTE — PROGRESS NOTE ADULT - PROBLEM SELECTOR PLAN 1
- Possibly 2/2 RT buttock wound. Now downtrending.  - Will empirically treat with IV Zosyn   - F/u CT chest to r/o PNA- Questionable small foci of infiltrate in the right upper lobe posteriorly and in the posterior right lower lobe  - F/u blood cx- NGTD  - Appreciate ID recs  - Will c/w IV Zosyn till 8/4

## 2021-08-03 NOTE — DISCHARGE NOTE PROVIDER - PROVIDER TOKENS
PROVIDER:[TOKEN:[[id number]]],PROVIDER:[TOKEN:[[id number]]],FREE:[LAST:[PMD],PHONE:[(   )    -],FAX:[(   )    -]]

## 2021-08-03 NOTE — DISCHARGE NOTE PROVIDER - NSDCMRMEDTOKEN_GEN_ALL_CORE_FT
carvedilol 3.125 mg oral tablet: 1 tab(s) orally every 12 hours  donepezil 10 mg oral tablet: 1 tab(s) orally once a day (at bedtime)  senna oral tablet: 2 tab(s) orally once a day (at bedtime), As needed, Constipation  SEROquel 50 mg oral tablet: 1 tab(s) orally once a day (at bedtime), As Needed for aggitation  simvastatin 20 mg oral tablet: 1 tab(s) orally once a day (at bedtime)  terazosin 5 mg oral capsule: 1 cap(s) orally once a day (at bedtime)   bacitracin 500 units/g topical ointment: 1 application topically once a day  bacitracin 500 units/g topical ointment: 1 application topically 2 times a day  donepezil 10 mg oral tablet: 1 tab(s) orally once a day (at bedtime)  senna oral tablet: 2 tab(s) orally once a day (at bedtime), As needed, Constipation  SEROquel 50 mg oral tablet: 1 tab(s) orally once a day (at bedtime), As Needed for aggitation  simvastatin 20 mg oral tablet: 1 tab(s) orally once a day (at bedtime)  terazosin 5 mg oral capsule: 1 cap(s) orally once a day (at bedtime)

## 2021-08-03 NOTE — PROGRESS NOTE ADULT - ASSESSMENT
Patient is a 89 yo male with hx of dementia , htn, bph, presents to the ED from home for right buttock wound. Patient disoriented at baseline. Daughter states that he often shifts himself in bed causing skin tear on left buttock that is now worse. Bleeds often. Patient does not give any history. no fever, chills, cough, sick contacts, n/v/d per history. Patient borderline hypotensive on arrival, no fever. pt's cbc showed wbc 20.98, no fever, no bandemia, no obvious source for elevated wbc. Pending home hospice referral. Plan for hospice consent forms to be reviewed on 8/4.

## 2021-08-03 NOTE — DISCHARGE NOTE PROVIDER - NSDCQMAMI_CARD_ALL_CORE
Patient seen with PA see his note for full details.  She is 15 weeks pregnant and she is here with a headache she has a throbbing headache that has been waxing and waning for few days it began very gradually.  There was no thunderclap.  The intensity of pain does move around a little bit sometimes in the back but mostly in the bilateral retro-orbital area there is associated photophobia.  Patient has never had a migraine but her mother does get migraines.  She is awake and alert afebrile she has a completely normal neurologic examination and no meningeal signs.  We discussed the options and offered her CT scan but given that she is pregnant I was leaning towards not doing any imaging especially with her family history, the way she describes the headache is very migrainous and a reassuring neurologic examination so therefore we have decided to defer imaging for now will focus on symptom control with some Reglan and Benadryl and then reassess      Felt much better after meds, d/c home , will return if worsening problems     Ace Ann MD  12/10/20 2245       Ace Ann MD  12/10/20 [id number]     No

## 2021-08-03 NOTE — PROGRESS NOTE ADULT - ASSESSMENT
89 yo male with hx of dementia , htn, bph,   presents to the ED for right buttock wound    TRICIA suspect 2/2 hypoperfusion  Serum cr 1.8--> 1.9 --> 1.6-> 1.49 now  Cont IVF gentle  HyperNatremia resolved  MA improved bicarb removed from IVF  Hypokalemia reslved with supplementation  Buttock wound - likely causing Leukocytosis  Abx noted renal dose Zosyn, ID following   Renal sono noted no hydronephrosis  CXR noted clear    D/w Jasmin NP - likely home hospice today or tomorrow  Shall d/c IVF today or tomorrow

## 2021-08-03 NOTE — DISCHARGE NOTE PROVIDER - NSDCCPCAREPLAN_GEN_ALL_CORE_FT
PRINCIPAL DISCHARGE DIAGNOSIS  Diagnosis: Leukocytosis  Assessment and Plan of Treatment: Unclear source, improved  completed  course IV  Zosyn  BC/UC negative      SECONDARY DISCHARGE DIAGNOSES  Diagnosis: Dementia  Assessment and Plan of Treatment: Discharge home with Hospice Support Services    Diagnosis: Urinary retention  Assessment and Plan of Treatment: vidales placed  Outpatient follow as per Hospice    Diagnosis: Pressure ulcer of buttock  Assessment and Plan of Treatment: wound care

## 2021-08-03 NOTE — PROGRESS NOTE ADULT - SUBJECTIVE AND OBJECTIVE BOX
[facility] Division of Hospital Medicine    Chief Complaint:  Leukocytosis and TRICIA    SUBJECTIVE / OVERNIGHT EVENTS: No acute events overnight. HD stable.     Patient denies chest pain, SOB, abd pain, N/V, fever, chills, dysuria or any other complaints. All remainder ROS negative.     MEDICATIONS  (STANDING):  BACItracin   Ointment 1 Application(s) Topical two times a day  BACItracin   Ointment 1 Application(s) Topical daily  dextrose 5% + sodium chloride 0.45% 1000 milliLiter(s) (82 mL/Hr) IV Continuous <Continuous>  donepezil 10 milliGRAM(s) Oral at bedtime  heparin   Injectable 5000 Unit(s) SubCutaneous every 12 hours  lactobacillus acidophilus 1 Tablet(s) Oral two times a day  piperacillin/tazobactam IVPB.. 3.375 Gram(s) IV Intermittent every 12 hours  simvastatin 20 milliGRAM(s) Oral at bedtime  tamsulosin 0.8 milliGRAM(s) Oral at bedtime    MEDICATIONS  (PRN):  QUEtiapine 25 milliGRAM(s) Oral at bedtime PRN agitation  senna 2 Tablet(s) Oral at bedtime PRN Constipation        I&O's Summary    02 Aug 2021 07:01  -  03 Aug 2021 07:00  --------------------------------------------------------  IN: 246 mL / OUT: 650 mL / NET: -404 mL    03 Aug 2021 07:01  -  03 Aug 2021 14:53  --------------------------------------------------------  IN: 0 mL / OUT: 700 mL / NET: -700 mL        PHYSICAL EXAM:  Vital Signs Last 24 Hrs  T(C): 36.4 (03 Aug 2021 10:23), Max: 37.3 (02 Aug 2021 20:51)  T(F): 97.6 (03 Aug 2021 10:23), Max: 99.1 (02 Aug 2021 20:51)  HR: 60 (03 Aug 2021 10:23) (60 - 75)  BP: 97/55 (03 Aug 2021 10:23) (97/55 - 105/66)  BP(mean): --  RR: 18 (03 Aug 2021 05:18) (16 - 18)  SpO2: 95% (03 Aug 2021 10:23) (95% - 97%)        CONSTITUTIONAL: NAD, resting comfortably  ENMT: Moist oral mucosa, no pharyngeal injection or exudates; normal dentition  RESPIRATORY: Normal respiratory effort; lungs are clear to auscultation bilaterally  CARDIOVASCULAR: Regular rate and rhythm, normal S1 and S2, no murmur/rub/gallop; No lower extremity edema; Peripheral pulses are 2+ bilaterally  ABDOMEN: Nontender to palpation, normoactive bowel sounds, no rebound/guarding  PSYCH: AxO x1 (name), speech clear, moving all extremities  NEUROLOGY: CN 2-12 are intact and symmetric; no gross sensory deficits;   SKIN: left buttock wound covered in dressing, no drainage appreciated    LABS:                        14.3   14.19 )-----------( 101      ( 03 Aug 2021 07:12 )             43.3     08-03    145  |  112<H>  |  35.2<H>  ----------------------------<  138<H>  3.8   |  22.0  |  1.49<H>    Ca    7.6<L>      03 Aug 2021 07:12  Phos  2.2     08-03  Mg     1.8     08-03    TPro  5.1<L>  /  Alb  1.9<L>  /  TBili  0.9  /  DBili  x   /  AST  75<H>  /  ALT  19  /  AlkPhos  71  08-03          Urinalysis Basic - ( 02 Aug 2021 09:37 )    Color: Yellow / Appearance: Clear / S.010 / pH: x  Gluc: x / Ketone: Negative  / Bili: Negative / Urobili: Negative mg/dL   Blood: x / Protein: 15 mg/dL / Nitrite: Negative   Leuk Esterase: Negative / RBC: 0-2 /HPF / WBC 0-2   Sq Epi: x / Non Sq Epi: Occasional / Bacteria: Negative        Culture - Urine (collected 02 Aug 2021 13:34)  Source: Catheterized Catheterized  Final Report (03 Aug 2021 09:25):    No growth      CAPILLARY BLOOD GLUCOSE            RADIOLOGY & ADDITIONAL TESTS:  Results Reviewed:   Imaging Personally Reviewed:  Electrocardiogram Personally Reviewed:

## 2021-08-03 NOTE — PROGRESS NOTE ADULT - PROBLEM SELECTOR PLAN 6
- DVT ppx- Heparin ppx  - Diet- Dysphagia 1 pureed nectar  - Dispo- PT recs- home w/ assist. Daughter is requesting home hospice referral. Will need bonny lift and incontinence supplies  - CODE STATUS- DNR/DNI    - Updated patient's daughter, Clarissa on 8/3.    Pending hospice referral given patient is bedbound, has dysphagia, decubitus ulcer and has been declining. Plan for hospice consent forms to be signed on 8/4.

## 2021-08-03 NOTE — PROGRESS NOTE ADULT - PROBLEM SELECTOR PLAN 3
- Left buttock area with about 5 cm x 5cm skin tear. Pressure ulcer w/ eschar, no purulent drainage  - F/u wound care recs- cleanse with normal saline, Santyl daily for RT hip and LT buttock unstaegable ulcer, c/w frequent position Q2H

## 2021-08-03 NOTE — PROGRESS NOTE ADULT - SUBJECTIVE AND OBJECTIVE BOX
Patient seen and examined    Lying quietly in bed  not offering any c/o CP SOB NV   No swelling feet  Poor PO intake    Vital Signs Last 24 Hrs  T(C): 36.4 (03 Aug 2021 10:23), Max: 37.3 (02 Aug 2021 20:51)  T(F): 97.6 (03 Aug 2021 10:23), Max: 99.1 (02 Aug 2021 20:51)  HR: 60 (03 Aug 2021 10:23) (60 - 75)  BP: 97/55 (03 Aug 2021 10:23) (97/55 - 105/66)  BP(mean): --  RR: 18 (03 Aug 2021 05:18) (16 - 18)  SpO2: 95% (03 Aug 2021 10:23) (95% - 97%)    PHYSICAL EXAM    GENERAL: NAD, emaciated  EYES:  conjunctiva and sclera clear  NECK: Supple, No JVD/Bruit  NERVOUS SYSTEM:  A/O x3,   CHEST:  No rales, No rhonchi  HEART:  RRR, No murmur  ABDOMEN: Soft, NT/ND BS+  EXTREMITIES:  No Edema; contracted LEs  SKIN: No rashes    03 Aug 2021 07:12    145    |  112    |  35.2   ----------------------------<  138    3.8     |  22.0   |  1.49     Ca    7.6        03 Aug 2021 07:12  Phos  2.2       03 Aug 2021 07:12  Mg     1.8       03 Aug 2021 07:12    TPro  5.1    /  Alb  1.9    /  TBili  0.9    /  DBili  x      /  AST  75     /  ALT  19     /  AlkPhos  71     03 Aug 2021 07:12                          14.3   14.19 )-----------( 101      ( 03 Aug 2021 07:12 )             43.3

## 2021-08-03 NOTE — DISCHARGE NOTE PROVIDER - NSDCCPTREATMENT_GEN_ALL_CORE_FT
PRINCIPAL PROCEDURE  Procedure: Teach about wound care  Findings and Treatment: Left Buttock Unstageable - cleanse with normal saline, Santyl daily  Right Hip Unstageable - cleanse with normal saline, Santyl daily  Right Buttock Skin Tear - Cavilon Advanced Q3 days   Turn and Position Q2H while in bed  Offload patient back to alternating side when repositioning with Z-jacqueline pillow or wedge  Nutritional Consult (Elvis consideration)  [place]initron bed

## 2021-08-03 NOTE — DISCHARGE NOTE PROVIDER - HOSPITAL COURSE
Patient is a 89 yo male with hx of dementia , htn, bph, presented to the ED from home for right buttock wound. Patient disoriented at baseline. Daughter states that he often shifts himself in bed causing skin tear on left buttock that is now worse. Bleeds often. Patient cbc showed wbc 20.98, no fever, no bandemia, no obvious source for elevated wbc. Seen by wound care for pressure ulcer recommendations/wound care.  Seen in consultation by Id and Nephrology. The patient was empirically treat with IV Zosyn . BC/UC negative and ABX were discontinued. Course complicated by urinary retention requiring placement of vidales catheter. TRICIA improved with IV hydration. US kidneys to r/o obstruction- No gross hydronephrosis. LT renal cyst. GOC discussed with daughter/HCP. MOLST form completed. Patient to be discharged home with Hospice support services.        Patient is a 89 yo male with hx of dementia , htn, bph, presented to the ED from home for right buttock wound. Patient disoriented at baseline. Daughter states that he often shifts himself in bed causing skin tear on left buttock that is now worse. Bleeds often. Patient cbc showed wbc 20.98, no fever, no bandemia, no obvious source for elevated wbc. Seen by wound care for pressure ulcer recommendations/wound care.  Seen in consultation by Id and Nephrology. The patient was empirically treat with IV Zosyn . BC/UC negative and ABX were discontinued. Course complicated by urinary retention requiring placement of vidales catheter. TRICIA improved with IV hydration. US kidneys to r/o obstruction- No gross hydronephrosis. LT renal cyst. GOC discussed with daughter/HCP. MOLST form completed. Patient to be discharged home with Hospice support services.     Vital Signs Last 24 Hrs  T(F): 98.5 (05 Aug 2021 04:33), Max: 98.5 (05 Aug 2021 04:33)  HR: 66 (05 Aug 2021 04:33) (60 - 66)  BP: 119/65 (05 Aug 2021 04:33) (115/77 - 119/65)  RR: 19 (05 Aug 2021 04:33) (18 - 19)  SpO2: 95% (05 Aug 2021 04:33) (95% - 96%)    CONSTITUTIONAL: NAD, resting comfortably  ENMT: Moist oral mucosa, no pharyngeal injection or exudates; normal dentition  RESPIRATORY: Normal respiratory effort; lungs are clear to auscultation bilaterally  CARDIOVASCULAR: Regular rate and rhythm, normal S1 and S2, no murmur/rub/gallop; No lower extremity edema; Peripheral pulses are 2+ bilaterally  ABDOMEN: Nontender to palpation, normoactive bowel sounds, no rebound/guarding  PSYCH: AxO x1 (name), speech clear, moving all extremities  NEUROLOGY: CN 2-12 are intact and symmetric; no gross sensory deficits;   SKIN: left buttock wound covered in dressing, no drainage appreciated    Time spent on patients discharge 43 minutes

## 2021-08-03 NOTE — DISCHARGE NOTE PROVIDER - CARE PROVIDER_API CALL
Christos Fernandez (DO)  Internal Medicine  [address], [place] [address]  Phone: [phone]  Fax: [phone]  Follow Up Time:     Dima Neely)  Infectious Disease; Internal Medicine  [address] [place] [address]  Phone: [phone]  Fax: [phone]  Follow Up Time:     PMD,   Phone: (   )    -  Fax: (   )    -  Follow Up Time:

## 2021-08-04 LAB
ALBUMIN SERPL ELPH-MCNC: 2 G/DL — LOW (ref 3.3–5.2)
ALP SERPL-CCNC: 83 U/L — SIGNIFICANT CHANGE UP (ref 40–120)
ALT FLD-CCNC: 23 U/L — SIGNIFICANT CHANGE UP
ANION GAP SERPL CALC-SCNC: 11 MMOL/L — SIGNIFICANT CHANGE UP (ref 5–17)
AST SERPL-CCNC: 80 U/L — HIGH
BILIRUB SERPL-MCNC: 0.9 MG/DL — SIGNIFICANT CHANGE UP (ref 0.4–2)
BUN SERPL-MCNC: 29.7 MG/DL — HIGH (ref 8–20)
CALCIUM SERPL-MCNC: 8.1 MG/DL — LOW (ref 8.6–10.2)
CHLORIDE SERPL-SCNC: 112 MMOL/L — HIGH (ref 98–107)
CO2 SERPL-SCNC: 23 MMOL/L — SIGNIFICANT CHANGE UP (ref 22–29)
CREAT SERPL-MCNC: 1.45 MG/DL — HIGH (ref 0.5–1.3)
CULTURE RESULTS: SIGNIFICANT CHANGE UP
CULTURE RESULTS: SIGNIFICANT CHANGE UP
GLUCOSE SERPL-MCNC: 102 MG/DL — HIGH (ref 70–99)
HCT VFR BLD CALC: 50.1 % — HIGH (ref 39–50)
HGB BLD-MCNC: 16 G/DL — SIGNIFICANT CHANGE UP (ref 13–17)
MAGNESIUM SERPL-MCNC: 1.9 MG/DL — SIGNIFICANT CHANGE UP (ref 1.6–2.6)
MCHC RBC-ENTMCNC: 29.7 PG — SIGNIFICANT CHANGE UP (ref 27–34)
MCHC RBC-ENTMCNC: 31.9 GM/DL — LOW (ref 32–36)
MCV RBC AUTO: 92.9 FL — SIGNIFICANT CHANGE UP (ref 80–100)
PHOSPHATE SERPL-MCNC: 2.6 MG/DL — SIGNIFICANT CHANGE UP (ref 2.4–4.7)
PLATELET # BLD AUTO: 117 K/UL — LOW (ref 150–400)
POTASSIUM SERPL-MCNC: 4 MMOL/L — SIGNIFICANT CHANGE UP (ref 3.5–5.3)
POTASSIUM SERPL-SCNC: 4 MMOL/L — SIGNIFICANT CHANGE UP (ref 3.5–5.3)
PROT SERPL-MCNC: 5.9 G/DL — LOW (ref 6.6–8.7)
RBC # BLD: 5.39 M/UL — SIGNIFICANT CHANGE UP (ref 4.2–5.8)
RBC # FLD: 13.7 % — SIGNIFICANT CHANGE UP (ref 10.3–14.5)
SODIUM SERPL-SCNC: 146 MMOL/L — HIGH (ref 135–145)
SPECIMEN SOURCE: SIGNIFICANT CHANGE UP
SPECIMEN SOURCE: SIGNIFICANT CHANGE UP
WBC # BLD: 14.85 K/UL — HIGH (ref 3.8–10.5)
WBC # FLD AUTO: 14.85 K/UL — HIGH (ref 3.8–10.5)

## 2021-08-04 PROCEDURE — 99232 SBSQ HOSP IP/OBS MODERATE 35: CPT

## 2021-08-04 RX ORDER — SODIUM CHLORIDE 9 MG/ML
1000 INJECTION INTRAMUSCULAR; INTRAVENOUS; SUBCUTANEOUS
Refills: 0 | Status: DISCONTINUED | OUTPATIENT
Start: 2021-08-04 | End: 2021-08-05

## 2021-08-04 RX ADMIN — Medication 1 APPLICATION(S): at 12:35

## 2021-08-04 RX ADMIN — Medication 1 APPLICATION(S): at 17:49

## 2021-08-04 RX ADMIN — PIPERACILLIN AND TAZOBACTAM 25 GRAM(S): 4; .5 INJECTION, POWDER, LYOPHILIZED, FOR SOLUTION INTRAVENOUS at 05:26

## 2021-08-04 RX ADMIN — HEPARIN SODIUM 5000 UNIT(S): 5000 INJECTION INTRAVENOUS; SUBCUTANEOUS at 05:26

## 2021-08-04 RX ADMIN — SODIUM CHLORIDE 100 MILLILITER(S): 9 INJECTION INTRAMUSCULAR; INTRAVENOUS; SUBCUTANEOUS at 23:20

## 2021-08-04 RX ADMIN — Medication 1 TABLET(S): at 05:26

## 2021-08-04 RX ADMIN — TAMSULOSIN HYDROCHLORIDE 0.8 MILLIGRAM(S): 0.4 CAPSULE ORAL at 22:33

## 2021-08-04 RX ADMIN — Medication 1 APPLICATION(S): at 05:27

## 2021-08-04 RX ADMIN — HEPARIN SODIUM 5000 UNIT(S): 5000 INJECTION INTRAVENOUS; SUBCUTANEOUS at 17:49

## 2021-08-04 RX ADMIN — SODIUM CHLORIDE 100 MILLILITER(S): 9 INJECTION INTRAMUSCULAR; INTRAVENOUS; SUBCUTANEOUS at 12:34

## 2021-08-04 RX ADMIN — DONEPEZIL HYDROCHLORIDE 10 MILLIGRAM(S): 10 TABLET, FILM COATED ORAL at 22:33

## 2021-08-04 RX ADMIN — Medication 1 TABLET(S): at 17:49

## 2021-08-04 RX ADMIN — SIMVASTATIN 20 MILLIGRAM(S): 20 TABLET, FILM COATED ORAL at 22:33

## 2021-08-04 NOTE — PROGRESS NOTE ADULT - PROBLEM SELECTOR PROBLEM 3
Buttock wound, left, subsequent encounter

## 2021-08-04 NOTE — PROGRESS NOTE ADULT - PROBLEM SELECTOR PLAN 1
- Possibly 2/2 RT buttock wound. Now downtrending.  - Will empirically treat with IV Zosyn   - F/u CT chest to r/o PNA- Questionable small foci of infiltrate in the right upper lobe posteriorly and in the posterior right lower lobe  - F/u blood cx- NGTD  - Appreciate ID recs  - Will c/w IV Zosyn till 8/4 - Possibly 2/2 RT buttock wound. Now downtrending.  - Will empirically treat with IV Zosyn   - F/u CT chest to r/o PNA- Questionable small foci of infiltrate in the right upper lobe posteriorly and in the posterior right lower lobe  - F/u blood cx- NGTD  - Appreciate ID recs  - Discontinued Zosyn on 8/4

## 2021-08-04 NOTE — PROGRESS NOTE ADULT - ASSESSMENT
TRICIA, hypovolemic hypernatremia  Renal sono noted no hydronephrosis  - avoid potential nephrotoxins  - IV hypotonic fluids  - monitor labs

## 2021-08-04 NOTE — PROGRESS NOTE ADULT - PROBLEM SELECTOR PLAN 6
- DVT ppx- Heparin ppx  - Diet- Dysphagia 1 pureed nectar  - Dispo- PT recs- home w/ assist. Daughter is requesting home hospice referral. Will need bonny lift and incontinence supplies  - CODE STATUS- DNR/DNI    - Updated patient's daughter, Clarissa on 8/3.    Pending hospice referral given patient is bedbound, has dysphagia, decubitus ulcer and has been declining. Plan for hospice consent forms to be signed on 8/4. - DVT ppx- Heparin ppx  - Diet- Dysphagia 1 pureed nectar  - Dispo- PT recs- home w/ hospice. Will need bonny lift and incontinence supplies  - CODE STATUS- DNR/DNI    - Updated patient's daughter, Clarissa on 8/4.    - Pending home hospice. Anticipated discharge date 8/5. 24/7 care will be resumed on 8/5

## 2021-08-04 NOTE — PROGRESS NOTE ADULT - SUBJECTIVE AND OBJECTIVE BOX
NEPHROLOGY INTERVAL HPI/OVERNIGHT EVENTS:  pt resting comfortably  no acute distress noted    MEDICATIONS  (STANDING):  BACItracin   Ointment 1 Application(s) Topical two times a day  BACItracin   Ointment 1 Application(s) Topical daily  dextrose 5% + sodium chloride 0.45% 1000 milliLiter(s) (82 mL/Hr) IV Continuous <Continuous>  donepezil 10 milliGRAM(s) Oral at bedtime  heparin   Injectable 5000 Unit(s) SubCutaneous every 12 hours  lactobacillus acidophilus 1 Tablet(s) Oral two times a day  simvastatin 20 milliGRAM(s) Oral at bedtime  tamsulosin 0.8 milliGRAM(s) Oral at bedtime    MEDICATIONS  (PRN):  QUEtiapine 25 milliGRAM(s) Oral at bedtime PRN agitation  senna 2 Tablet(s) Oral at bedtime PRN Constipation      Allergies    No Known Allergies          Vital Signs Last 24 Hrs  T(C): 36.4 (04 Aug 2021 09:24), Max: 36.8 (03 Aug 2021 20:34)  T(F): 97.6 (04 Aug 2021 09:24), Max: 98.2 (03 Aug 2021 20:34)  HR: 76 (04 Aug 2021 09:24) (63 - 76)  BP: 111/75 (04 Aug 2021 09:24) (101/66 - 137/59)  BP(mean): --  RR: 16 (04 Aug 2021 04:54) (16 - 18)  SpO2: 96% (04 Aug 2021 09:24) (93% - 96%)    PHYSICAL EXAM:  GENERAL: Frail, chronically debilitated  HEENT: Dry MMs  NECK: Supple  NERVOUS SYSTEM: Demented  CHEST/LUNG: dec bs B/L  HEART: Regular rate and rhythm  ABDOMEN: Soft, Nontender, Nondistended; +BS  EXTREMITIES:   no edema; contractions    LABS:                        16.0   14.85 )-----------( 117      ( 04 Aug 2021 07:55 )             50.1     08-04    146<H>  |  112<H>  |  29.7<H>  ----------------------------<  102<H>  4.0   |  23.0  |  1.45<H>    Creatinine, Serum: 0.99 mg/dL (04.25.21 @ 09:10)    Ca    8.1<L>      04 Aug 2021 07:55  Phos  2.6     08-04  Mg     1.9     08-04    TPro  5.9<L>  /  Alb  2.0<L>  /  TBili  0.9  /  DBili  x   /  AST  80<H>  /  ALT  23  /  AlkPhos  83  08-04    Sodium, Random Urine: <30: Reference Ranges have NOT been established for random urine analytes due   to variability in fluid intake and concentration. mmol/L (07.31.21 @ 21:13)     Magnesium, Serum: 1.9 mg/dL (08-04 @ 07:55)  Phosphorus Level, Serum: 2.6 mg/dL (08-04 @ 07:55)      RADIOLOGY & ADDITIONAL TESTS:  < from: US Renal (07.31.21 @ 19:57) >     EXAM:  US KIDNEY(S)                          PROCEDURE DATE:  07/31/2021          INTERPRETATION:  CLINICAL INFORMATION: Acute kidney injury    COMPARISON: None available.    TECHNIQUE: Sonography of the kidneys and bladder. Study is limited by patient's condition    FINDINGS:    Right kidney: 8.2 cm. Limited visualization is unremarkable.    Left kidney: 10.3 cm. No renal mass, hydronephrosis or calculi. Left renal cysts are seen, largest in the upper pole    Urinary bladder: Within normal limits.    IMPRESSION:    No gross hydronephrosis although evaluation of the right kidney is technically limited  Left renal cyst  Grossly unremarkable appearance of the bladder    < end of copied text >

## 2021-08-04 NOTE — PROGRESS NOTE ADULT - PROBLEM SELECTOR PLAN 4
- Likely 2/2 hypoperfusion  - US kidneys to r/o obstruction- No gross hydronephrosis. LT renal cyst.   - appreciate nephro recs- C/w D51/2NS w/ sodium bicarb

## 2021-08-04 NOTE — PROGRESS NOTE ADULT - ASSESSMENT
Patient is a 89 yo male with hx of dementia , htn, bph, presents to the ED from home for right buttock wound. Patient disoriented at baseline. Daughter states that he often shifts himself in bed causing skin tear on left buttock that is now worse. Bleeds often. Patient does not give any history. no fever, chills, cough, sick contacts, n/v/d per history. Patient borderline hypotensive on arrival, no fever. pt's cbc showed wbc 20.98, no fever, no bandemia, no obvious source for elevated wbc. Pending home hospice referral. Plan for hospice consent forms to be reviewed on 8/4.  Patient is a 89 yo male with hx of dementia , htn, bph, presents to the ED from home for right buttock wound. Patient disoriented at baseline. Daughter states that he often shifts himself in bed causing skin tear on left buttock that is now worse. Bleeds often. Patient does not give any history. no fever, chills, cough, sick contacts, n/v/d per history. Patient borderline hypotensive on arrival, no fever. pt's cbc showed wbc 20.98, no fever, no bandemia, no obvious source for elevated wbc. Likely discharge to home w/ hospice on 8/5.

## 2021-08-04 NOTE — PROGRESS NOTE ADULT - SUBJECTIVE AND OBJECTIVE BOX
[facility] Division of Hospital Medicine    Chief Complaint:      SUBJECTIVE / OVERNIGHT EVENTS:    Patient denies chest pain, SOB, abd pain, N/V, fever, chills, dysuria or any other complaints. All remainder ROS negative.     MEDICATIONS  (STANDING):  BACItracin   Ointment 1 Application(s) Topical two times a day  BACItracin   Ointment 1 Application(s) Topical daily  donepezil 10 milliGRAM(s) Oral at bedtime  heparin   Injectable 5000 Unit(s) SubCutaneous every 12 hours  lactobacillus acidophilus 1 Tablet(s) Oral two times a day  simvastatin 20 milliGRAM(s) Oral at bedtime  sodium chloride 0.225%. 1000 milliLiter(s) (100 mL/Hr) IV Continuous <Continuous>  tamsulosin 0.8 milliGRAM(s) Oral at bedtime    MEDICATIONS  (PRN):  QUEtiapine 25 milliGRAM(s) Oral at bedtime PRN agitation  senna 2 Tablet(s) Oral at bedtime PRN Constipation        I&O's Summary    03 Aug 2021 07:01  -  04 Aug 2021 07:00  --------------------------------------------------------  IN: 0 mL / OUT: 1400 mL / NET: -1400 mL    04 Aug 2021 07:01  -  04 Aug 2021 14:38  --------------------------------------------------------  IN: 0 mL / OUT: 300 mL / NET: -300 mL        PHYSICAL EXAM:  Vital Signs Last 24 Hrs  T(C): 36.4 (04 Aug 2021 09:24), Max: 36.8 (03 Aug 2021 20:34)  T(F): 97.6 (04 Aug 2021 09:24), Max: 98.2 (03 Aug 2021 20:34)  HR: 76 (04 Aug 2021 09:24) (63 - 76)  BP: 111/75 (04 Aug 2021 09:24) (101/66 - 137/59)  BP(mean): --  RR: 16 (04 Aug 2021 04:54) (16 - 18)  SpO2: 96% (04 Aug 2021 09:24) (93% - 96%)        CONSTITUTIONAL: NAD, well-developed, well-groomed  ENMT: Moist oral mucosa, no pharyngeal injection or exudates; normal dentition  RESPIRATORY: Normal respiratory effort; lungs are clear to auscultation bilaterally  CARDIOVASCULAR: Regular rate and rhythm, normal S1 and S2, no murmur/rub/gallop; No lower extremity edema; Peripheral pulses are 2+ bilaterally  ABDOMEN: Nontender to palpation, normoactive bowel sounds, no rebound/guarding; No hepatosplenomegaly  MUSCLOSKELETAL:  Normal gait; no clubbing or cyanosis of digits; no joint swelling or tenderness to palpation  PSYCH: A+O to person, place, and time; affect appropriate  NEUROLOGY: CN 2-12 are intact and symmetric; no gross sensory deficits;   SKIN: No rashes; no palpable lesions    LABS:                        16.0   14.85 )-----------( 117      ( 04 Aug 2021 07:55 )             50.1     08-04    146<H>  |  112<H>  |  29.7<H>  ----------------------------<  102<H>  4.0   |  23.0  |  1.45<H>    Ca    8.1<L>      04 Aug 2021 07:55  Phos  2.6     08-04  Mg     1.9     08-04    TPro  5.9<L>  /  Alb  2.0<L>  /  TBili  0.9  /  DBili  x   /  AST  80<H>  /  ALT  23  /  AlkPhos  83  08-04              Culture - Urine (collected 02 Aug 2021 13:34)  Source: Catheterized Catheterized  Final Report (03 Aug 2021 09:25):    No growth      CAPILLARY BLOOD GLUCOSE            RADIOLOGY & ADDITIONAL TESTS:  Results Reviewed:   Imaging Personally Reviewed:  Electrocardiogram Personally Reviewed:                                           [facility] Division of Hospital Medicine    Chief Complaint:      SUBJECTIVE / OVERNIGHT EVENTS: No acute events overnight. HD stable.     Patient denies chest pain, SOB, abd pain, N/V, fever, chills, dysuria or any other complaints. All remainder ROS negative.     MEDICATIONS  (STANDING):  BACItracin   Ointment 1 Application(s) Topical two times a day  BACItracin   Ointment 1 Application(s) Topical daily  donepezil 10 milliGRAM(s) Oral at bedtime  heparin   Injectable 5000 Unit(s) SubCutaneous every 12 hours  lactobacillus acidophilus 1 Tablet(s) Oral two times a day  simvastatin 20 milliGRAM(s) Oral at bedtime  sodium chloride 0.225%. 1000 milliLiter(s) (100 mL/Hr) IV Continuous <Continuous>  tamsulosin 0.8 milliGRAM(s) Oral at bedtime    MEDICATIONS  (PRN):  QUEtiapine 25 milliGRAM(s) Oral at bedtime PRN agitation  senna 2 Tablet(s) Oral at bedtime PRN Constipation        I&O's Summary    03 Aug 2021 07:01  -  04 Aug 2021 07:00  --------------------------------------------------------  IN: 0 mL / OUT: 1400 mL / NET: -1400 mL    04 Aug 2021 07:01  -  04 Aug 2021 14:38  --------------------------------------------------------  IN: 0 mL / OUT: 300 mL / NET: -300 mL        PHYSICAL EXAM:  Vital Signs Last 24 Hrs  T(C): 36.4 (04 Aug 2021 09:24), Max: 36.8 (03 Aug 2021 20:34)  T(F): 97.6 (04 Aug 2021 09:24), Max: 98.2 (03 Aug 2021 20:34)  HR: 76 (04 Aug 2021 09:24) (63 - 76)  BP: 111/75 (04 Aug 2021 09:24) (101/66 - 137/59)  BP(mean): --  RR: 16 (04 Aug 2021 04:54) (16 - 18)  SpO2: 96% (04 Aug 2021 09:24) (93% - 96%)        CONSTITUTIONAL: NAD, resting comfortably  ENMT: Moist oral mucosa, no pharyngeal injection or exudates; normal dentition  RESPIRATORY: Normal respiratory effort; lungs are clear to auscultation bilaterally  CARDIOVASCULAR: Regular rate and rhythm, normal S1 and S2, no murmur/rub/gallop; No lower extremity edema; Peripheral pulses are 2+ bilaterally  ABDOMEN: Nontender to palpation, normoactive bowel sounds, no rebound/guarding  PSYCH: AxO x1 (name), speech clear, moving all extremities  NEUROLOGY: CN 2-12 are intact and symmetric; no gross sensory deficits;   SKIN: left buttock wound covered in dressing, no drainage appreciated      LABS:                        16.0   14.85 )-----------( 117      ( 04 Aug 2021 07:55 )             50.1     08-04    146<H>  |  112<H>  |  29.7<H>  ----------------------------<  102<H>  4.0   |  23.0  |  1.45<H>    Ca    8.1<L>      04 Aug 2021 07:55  Phos  2.6     08-04  Mg     1.9     08-04    TPro  5.9<L>  /  Alb  2.0<L>  /  TBili  0.9  /  DBili  x   /  AST  80<H>  /  ALT  23  /  AlkPhos  83  08-04              Culture - Urine (collected 02 Aug 2021 13:34)  Source: Catheterized Catheterized  Final Report (03 Aug 2021 09:25):    No growth      CAPILLARY BLOOD GLUCOSE            RADIOLOGY & ADDITIONAL TESTS:  Results Reviewed:   Imaging Personally Reviewed:  Electrocardiogram Personally Reviewed:

## 2021-08-05 ENCOUNTER — TRANSCRIPTION ENCOUNTER (OUTPATIENT)
Age: 86
End: 2021-08-05

## 2021-08-05 VITALS
TEMPERATURE: 98 F | HEART RATE: 96 BPM | OXYGEN SATURATION: 90 % | DIASTOLIC BLOOD PRESSURE: 55 MMHG | SYSTOLIC BLOOD PRESSURE: 88 MMHG | RESPIRATION RATE: 20 BRPM

## 2021-08-05 LAB
ANION GAP SERPL CALC-SCNC: 13 MMOL/L — SIGNIFICANT CHANGE UP (ref 5–17)
BUN SERPL-MCNC: 35.3 MG/DL — HIGH (ref 8–20)
CALCIUM SERPL-MCNC: 7.9 MG/DL — LOW (ref 8.6–10.2)
CHLORIDE SERPL-SCNC: 109 MMOL/L — HIGH (ref 98–107)
CO2 SERPL-SCNC: 19 MMOL/L — LOW (ref 22–29)
CREAT SERPL-MCNC: 1.24 MG/DL — SIGNIFICANT CHANGE UP (ref 0.5–1.3)
GLUCOSE SERPL-MCNC: 98 MG/DL — SIGNIFICANT CHANGE UP (ref 70–99)
HCT VFR BLD CALC: 44.3 % — SIGNIFICANT CHANGE UP (ref 39–50)
HGB BLD-MCNC: 14.6 G/DL — SIGNIFICANT CHANGE UP (ref 13–17)
MCHC RBC-ENTMCNC: 29.9 PG — SIGNIFICANT CHANGE UP (ref 27–34)
MCHC RBC-ENTMCNC: 33 GM/DL — SIGNIFICANT CHANGE UP (ref 32–36)
MCV RBC AUTO: 90.6 FL — SIGNIFICANT CHANGE UP (ref 80–100)
PLATELET # BLD AUTO: 125 K/UL — LOW (ref 150–400)
POTASSIUM SERPL-MCNC: 3.7 MMOL/L — SIGNIFICANT CHANGE UP (ref 3.5–5.3)
POTASSIUM SERPL-SCNC: 3.7 MMOL/L — SIGNIFICANT CHANGE UP (ref 3.5–5.3)
RBC # BLD: 4.89 M/UL — SIGNIFICANT CHANGE UP (ref 4.2–5.8)
RBC # FLD: 13.7 % — SIGNIFICANT CHANGE UP (ref 10.3–14.5)
SODIUM SERPL-SCNC: 141 MMOL/L — SIGNIFICANT CHANGE UP (ref 135–145)
WBC # BLD: 16.04 K/UL — HIGH (ref 3.8–10.5)
WBC # FLD AUTO: 16.04 K/UL — HIGH (ref 3.8–10.5)

## 2021-08-05 PROCEDURE — 36415 COLL VENOUS BLD VENIPUNCTURE: CPT

## 2021-08-05 PROCEDURE — 85027 COMPLETE CBC AUTOMATED: CPT

## 2021-08-05 PROCEDURE — U0003: CPT

## 2021-08-05 PROCEDURE — 84300 ASSAY OF URINE SODIUM: CPT

## 2021-08-05 PROCEDURE — 71250 CT THORAX DX C-: CPT

## 2021-08-05 PROCEDURE — 86769 SARS-COV-2 COVID-19 ANTIBODY: CPT

## 2021-08-05 PROCEDURE — 76775 US EXAM ABDO BACK WALL LIM: CPT

## 2021-08-05 PROCEDURE — 87040 BLOOD CULTURE FOR BACTERIA: CPT

## 2021-08-05 PROCEDURE — 80053 COMPREHEN METABOLIC PANEL: CPT

## 2021-08-05 PROCEDURE — U0005: CPT

## 2021-08-05 PROCEDURE — 84100 ASSAY OF PHOSPHORUS: CPT

## 2021-08-05 PROCEDURE — 96374 THER/PROPH/DIAG INJ IV PUSH: CPT

## 2021-08-05 PROCEDURE — 87086 URINE CULTURE/COLONY COUNT: CPT

## 2021-08-05 PROCEDURE — 80048 BASIC METABOLIC PNL TOTAL CA: CPT

## 2021-08-05 PROCEDURE — 99239 HOSP IP/OBS DSCHRG MGMT >30: CPT

## 2021-08-05 PROCEDURE — 96372 THER/PROPH/DIAG INJ SC/IM: CPT

## 2021-08-05 PROCEDURE — 85025 COMPLETE CBC W/AUTO DIFF WBC: CPT

## 2021-08-05 PROCEDURE — 96375 TX/PRO/DX INJ NEW DRUG ADDON: CPT

## 2021-08-05 PROCEDURE — 99285 EMERGENCY DEPT VISIT HI MDM: CPT

## 2021-08-05 PROCEDURE — 96361 HYDRATE IV INFUSION ADD-ON: CPT

## 2021-08-05 PROCEDURE — 71045 X-RAY EXAM CHEST 1 VIEW: CPT

## 2021-08-05 PROCEDURE — 81001 URINALYSIS AUTO W/SCOPE: CPT

## 2021-08-05 PROCEDURE — 83735 ASSAY OF MAGNESIUM: CPT

## 2021-08-05 RX ORDER — MORPHINE SULFATE 50 MG/1
2.5 CAPSULE, EXTENDED RELEASE ORAL
Qty: 20 | Refills: 0
Start: 2021-08-05

## 2021-08-05 RX ORDER — BACITRACIN ZINC 500 UNIT/G
1 OINTMENT IN PACKET (EA) TOPICAL
Qty: 0 | Refills: 0 | DISCHARGE
Start: 2021-08-05

## 2021-08-05 RX ADMIN — Medication 1 TABLET(S): at 05:15

## 2021-08-05 RX ADMIN — Medication 1 APPLICATION(S): at 05:15

## 2021-08-05 RX ADMIN — HEPARIN SODIUM 5000 UNIT(S): 5000 INJECTION INTRAVENOUS; SUBCUTANEOUS at 05:15

## 2021-08-05 RX ADMIN — SODIUM CHLORIDE 100 MILLILITER(S): 9 INJECTION INTRAMUSCULAR; INTRAVENOUS; SUBCUTANEOUS at 11:53

## 2021-08-05 RX ADMIN — Medication 1 APPLICATION(S): at 11:53

## 2021-08-05 NOTE — PROGRESS NOTE ADULT - REASON FOR ADMISSION
leukocytosis

## 2021-08-05 NOTE — HOSPICE CARE NOTE - CONVESATION DETAILS
[facility] RN spoke with Dr. Vaca, who confirmed that patient is stable for transfer home today. Will request 2pm ambulance  from [facility].     Spoke with patient's daughter/PCG, Clarissa Sharpe - she will be home this afternoon to receive patient. As per daughter Clarissa, patient already has all of his d/c medications in the home.    As per hospice , patient is scheduled for visit with hospice RN tomorrow (Friday, 8/6).     Will continue to follow until d/c. 
Home hospice referral received. [facility] RN spoke with patient's daughter, Clarissa. All aspects of home hospice services explained with good return understanding. All questions answered; emotional support provided. Daughter Clarissa agreed to home hospice upon patient's d/c from [facility].    Hospice consent forms to be emailed to Clarissa this evening; will review with her Wednesday morning - as per Clarissa, she was told that patient is still acute and will not be d/c'd home until the end of the week.

## 2021-08-05 NOTE — HOSPICE CARE NOTE - DME DETAILS
Previously ordered a Hira Lift and incontinence supplies. At daughter Clarissa's request today, also requested a new air mattress, to be delivered later today. 
Hira lift, incontinence supplies.

## 2021-08-05 NOTE — GOALS OF CARE CONVERSATION - ADVANCED CARE PLANNING - CONVERSATION DETAILS
Care manager note: pt stable for DC today. Pt accepted and approved for home hospice services through [facility]. Spoke with [facility] Amy CUEVAS (P: [phone]) who confirmed HHA resumed today for start of care starting at 15:00. Pt's daughter Clarissa will be home to receive pt today. Ambulance arranged for a 14:00 p/u from [facility] to return home. NEAF uploaded into ACM and provided to unit. DC summary was sent to [facility] f: [phone] and [facility] f: [phone].
Care manager note: anticipated DC date 8/5 per Dr. Vaca, attending hospitalist. This worker [facility] (P: [phone]) to advise her of DC date 8/5 to resume 24/7 live in. This worker also spoke with Anais  is Candie (P: [phone] ext [phone]) to obtain [facility]. Verbal auth provided by [facility] and FAITH Schreiber will contact Amy @ [facility] so that 24/7 live in can be resumed for DC home with hospice services on 8/5. Amy @ [facility] requested DC summary be faxed to her (F: [phone]) as well as to FAITH Schreiber @ [facility] (F: [phone]).
Care manager note: [facility] Rn Liason Swati Vicentereji to speak with daughter 8/4 to review hospice consents and home hospice dispo. Will continue to follow.
Case discussed with interdisciplinary team and it was noted that daughter requesting home hospice services.  Outreach made to daughter/emergency contact Clarissa Sharpe (h:[phone]) who confirmed demographics.  She noted that she has been caring for patient since 2012, has live-in PCA via [facility] presently and outpt cardiologist had initially discussed hospice services with her recently.  Hospice services reviewed broadly and daughter agreeable to hospice referral--HCN requested.  Referral sent on this date and pending further review.      PMD: Dr. Velasquez - [phone]  Rx: [facility]  [phone]
Spoke with daughter Clarissa, she states that patient's Cardiologist mentioned to her that Hospice referral would be appropriate. Patient has been declining. Currently bedbound, has dysphagia, decubitus ulcer, vidales placed for retention. MOLST form in [place], patient is DNR/DNI. Daughter requesting Hospice referral for home hospice.

## 2021-08-05 NOTE — PROGRESS NOTE ADULT - ASSESSMENT
TRICIA, hypovolemic hypernatremia  L > R kidney on imaging  Renal sono noted no hydronephrosis  - avoid potential nephrotoxins  - IV hypotonic fluids and adjust as needed  - monitor labs  - consider eventual nuclear renal scan to assess R renal function

## 2021-08-05 NOTE — GOALS OF CARE CONVERSATION - ADVANCED CARE PLANNING - CONVERSATION/DISCUSSION
Hospice Referral
Diagnosis/Prognosis/MOLST Discussed/Hospice Referral

## 2021-08-05 NOTE — PROGRESS NOTE ADULT - SUBJECTIVE AND OBJECTIVE BOX
NEPHROLOGY INTERVAL HPI/OVERNIGHT EVENTS:  pt comfortable  no acute distress noted    MEDICATIONS  (STANDING):  BACItracin   Ointment 1 Application(s) Topical two times a day  BACItracin   Ointment 1 Application(s) Topical daily  donepezil 10 milliGRAM(s) Oral at bedtime  heparin   Injectable 5000 Unit(s) SubCutaneous every 12 hours  lactobacillus acidophilus 1 Tablet(s) Oral two times a day  simvastatin 20 milliGRAM(s) Oral at bedtime  sodium chloride 0.225%. 1000 milliLiter(s) (100 mL/Hr) IV Continuous <Continuous>  tamsulosin 0.8 milliGRAM(s) Oral at bedtime    MEDICATIONS  (PRN):  QUEtiapine 25 milliGRAM(s) Oral at bedtime PRN agitation  senna 2 Tablet(s) Oral at bedtime PRN Constipation      Allergies    No Known Allergies        Vital Signs Last 24 Hrs  T(C): 36.9 (05 Aug 2021 04:33), Max: 36.9 (05 Aug 2021 04:33)  T(F): 98.5 (05 Aug 2021 04:33), Max: 98.5 (05 Aug 2021 04:33)  HR: 66 (05 Aug 2021 04:33) (60 - 76)  BP: 119/65 (05 Aug 2021 04:33) (111/75 - 119/65)  BP(mean): --  RR: 19 (05 Aug 2021 04:33) (18 - 19)  SpO2: 95% (05 Aug 2021 04:33) (95% - 96%)    PHYSICAL EXAM:  GENERAL: Weak, deconditioned  HEENT: Dry mucous membranes  NECK: Supple  NERVOUS SYSTEM: Demented  CHEST/LUNG: Diminished  B/L breath sounds  HEART: Regular rate and rhythm; no rub  ABDOMEN: Soft, Nontender, Nondistended; +BS  EXTREMITIES: No edema; contractions    LABS:                        16.0   14.85 )-----------( 117      ( 04 Aug 2021 07:55 )             50.1     08-04    146<H>  |  112<H>  |  29.7<H>  ----------------------------<  102<H>  4.0   |  23.0  |  1.45<H>    Ca    8.1<L>      04 Aug 2021 07:55  Phos  2.6     08-04  Mg     1.9     08-04    TPro  5.9<L>  /  Alb  2.0<L>  /  TBili  0.9  /  DBili  x   /  AST  80<H>  /  ALT  23  /  AlkPhos  83  08-04            RADIOLOGY & ADDITIONAL TESTS:  < from: US Renal (07.31.21 @ 19:57) >     EXAM:  US KIDNEY(S)                          PROCEDURE DATE:  07/31/2021          INTERPRETATION:  CLINICAL INFORMATION: Acute kidney injury    COMPARISON: None available.    TECHNIQUE: Sonography of the kidneys and bladder. Study is limited by patient's condition    FINDINGS:    Right kidney: 8.2 cm. Limited visualization is unremarkable.    Left kidney: 10.3 cm. No renal mass, hydronephrosis or calculi. Left renal cysts are seen, largest in the upper pole    Urinary bladder: Within normal limits.    IMPRESSION:    No gross hydronephrosis although evaluation of the right kidney is technically limited  Left renal cyst  Grossly unremarkable appearance of the bladder    < end of copied text >

## 2021-08-05 NOTE — DISCHARGE NOTE NURSING/CASE MANAGEMENT/SOCIAL WORK - PATIENT PORTAL LINK FT
You can access the FollowMyHealth Patient Portal offered by [facility] by registering at the following website: [email or web address]. By joining [facility]’s FollowMyHealth portal, you will also be able to view your health information using other applications (apps) compatible with our system.

## 2021-08-05 NOTE — HOSPICE CARE NOTE - NS PRO AD PATIENT TYPE
Do Not Resuscitate (DNR)
Do Not Resuscitate (DNR)/Medical Orders for Life-Sustaining Treatment (MOLST)

## 2021-08-05 NOTE — PROGRESS NOTE ADULT - PROVIDER SPECIALTY LIST ADULT
Hospitalist
Nephrology
Hospitalist

## 2021-12-13 NOTE — ED ADULT NURSE NOTE - CHIEF COMPLAINT
Sent script.  If symptoms persist, she will need to be seen for exam. The patient is a 89y Male complaining of diarrhea.

## 2023-01-09 NOTE — PROGRESS NOTE ADULT - [facility]ST[facility]_GEN_ALL_CORE
Problem: PAIN - ADULT  Goal: Verbalizes/displays adequate comfort level or baseline comfort level  Description: Interventions:  - Encourage patient to monitor pain and request assistance  - Assess pain using appropriate pain scale  - Administer analgesics based on type and severity of pain and evaluate response  - Implement non-pharmacological measures as appropriate and evaluate response  - Consider cultural and social influences on pain and pain management  - Notify physician/advanced practitioner if interventions unsuccessful or patient reports new pain  Outcome: Progressing     Problem: INFECTION - ADULT  Goal: Absence or prevention of progression during hospitalization  Description: INTERVENTIONS:  - Assess and monitor for signs and symptoms of infection  - Monitor lab/diagnostic results  - Monitor all insertion sites, i e  indwelling lines,  - Monitor endotracheal if appropriate and nasal secretions for changes in amount and color  - [place] appropriate cooling/warming therapies per order  - Administer medications as ordered  - Instruct and encourage patient and family to use good hand hygiene technique  - Identify and instruct in appropriate isolation precautions for identified infection/condition  Outcome: Progressing     Problem: SAFETY ADULT  Goal: Patient will remain free of falls  Description: INTERVENTIONS:  - Educate patient/family on patient safety including physical limitations  - Instruct patient to call for assistance with activity   - Consult OT/PT to assist with strengthening/mobility   - Keep Call bell within reach  - Keep bed low and locked with side rails adjusted as appropriate  - Keep care items and personal belongings within reach  - Initiate and maintain comfort rounds  - Make Fall Risk Sign visible to staff  - Offer Toileting every 2 Hours, in advance of need  - Obtain necessary fall risk management equipment:   - Apply yellow socks and bracelet for high fall risk patients  - Consider No moving patient to room near nurses station  Outcome: Progressing  Goal: Maintain or return to baseline ADL function  Description: INTERVENTIONS:  -  Assess patient's ability to carry out ADLs; assess patient's baseline for ADL function and identify physical deficits which impact ability to perform ADLs (bathing, care of mouth/teeth, toileting, grooming, dressing, etc )  - Assess/evaluate cause of self-care deficits   - Assess range of motion  - Assess patient's mobility; develop plan if impaired  - Assess patient's need for assistive devices and provide as appropriate  - Encourage maximum independence but intervene and supervise when necessary  - Involve family in performance of ADLs  - Assess for home care needs following discharge   - Consider OT consult to assist with ADL evaluation and planning for discharge  - Provide patient education as appropriate  Outcome: Progressing  Goal: Maintains/Returns to pre admission functional level  Description: INTERVENTIONS:  - Perform BMAT or MOVE assessment daily    - Set and communicate daily mobility goal to care team and patient/family/caregiver  - Collaborate with rehabilitation services on mobility goals if consulted  - Perform Range of Motion 2 times a day    - Reposition patient every 2 hours/self  - Dangle patient 3 times a day  - Stand patient 3 times a day  - Ambulate patient 3 times a day  - Out of bed to chair 3 times a day   - Out of bed for meals 3 times a day  - Out of bed for toileting  - Record patient progress and toleration of activity level   Outcome: Progressing     Problem: DISCHARGE PLANNING  Goal: Discharge to home or other facility with appropriate resources  Description: INTERVENTIONS:  - Identify barriers to discharge w/patient and caregiver  - Arrange for needed discharge resources and transportation as appropriate  - Identify discharge learning needs (meds, wound care, etc )  - Arrange for interpretive services to assist at discharge as needed  - Refer to Case Management Department for coordinating discharge planning if the patient needs post-hospital services based on physician/advanced practitioner order or complex needs related to functional status, cognitive ability, or social support system  Outcome: Progressing     Problem: Knowledge Deficit  Goal: Patient/family/caregiver demonstrates understanding of disease process, treatment plan, medications, and discharge instructions  Description: Complete learning assessment and assess knowledge base    Interventions:  - Provide teaching at level of understanding  - Provide teaching via preferred learning methods  Outcome: Progressing

## 2023-01-16 NOTE — ED CDU PROVIDER DISPOSITION NOTE - NS_BEDUNITTYPES_ED_ALL_ED
Implemented All Fall Risk Interventions:  [place] to call system. Call bell, personal items and telephone within reach. Instruct patient to call for assistance. Room bathroom lighting operational. Non-slip footwear when patient is off stretcher. Physically safe environment: no spills, clutter or unnecessary equipment. Stretcher in lowest position, wheels locked, appropriate side rails in place. Provide visual cue, wrist band, yellow gown, etc. Monitor gait and stability. Monitor for mental status changes and reorient to person, place, and time. Review medications for side effects contributing to fall risk. Reinforce activity limits and safety measures with patient and family.
TELEMETRY

## 2023-04-04 NOTE — ED ADULT NURSE NOTE - HIV OFFER
Unable to answer due to medical condition/unresponsive/etc...
Detail Level: Detailed
Hide Additional Notes?: No

## 2023-06-01 NOTE — ED PROVIDER NOTE - PMH
Sent [address] message for patient rounding   
Anginal pain    BPH (benign prostatic hypertrophy)    Cataract    Coronary artery disease    Enlarged prostate    Heart attack    Hypothyroidism    Reflux    Stented coronary artery  x1  TIA (transient ischemic attack)

## 2023-08-24 NOTE — H&P PST ADULT - EYES
EOMI; PERRL; no drainage or redness Spironolactone Counseling: Patient advised regarding risks of diarrhea, abdominal pain, hyperkalemia, birth defects (for female patients), liver toxicity and renal toxicity. The patient may need blood work to monitor liver and kidney function and potassium levels while on therapy. The patient verbalized understanding of the proper use and possible adverse effects of spironolactone.  All of the patient's questions and concerns were addressed.

## 2023-11-27 NOTE — HOSPICE CARE NOTE - FAMILY IN AGREEMENT
Diagnosis: Pneumonia, C.diff  Mental Status: A&O x3; forgetful   Activity/dangle: A2 w/ gait belt & walker  Diet: Regular  [place]/Voiding: bedside commode  Pain: denies  Tele/Restraints/Iso: Enteric precaution   02/LDA: room air, PIV SL.   D/C Date: TBD  Other Info: Heparin BOLUS @0630- drip infusing @1750 Units/hr, recheck at 1145, unable to collect sputum specimen.   
Yes
Yes

## 2025-01-06 NOTE — PROGRESS NOTE ADULT - PROBLEM SELECTOR PLAN 4
Adult - Likely 2/2 hypoperfusion  - US kidneys to r/o obstruction- No gross hydronephrosis. LT renal cyst.   - appreciate nephro recs- C/w D51/2NS w/ sodium bicarb

## 2025-07-10 NOTE — ED CDU PROVIDER INITIAL DAY NOTE - NS_EDPROVIDERDISPOUSERTYPE_ED_A_ED
Patient was admitted with acute hypoxic respiratory failure secondary to COPD exacerbation.  She was provided supplemental oxygen, steroids, antibiotics, and breathing treatments.  He was monitored closely during her stay.  She was slowly able to wean off of oxygen.  Her symptoms improved and she expressed readiness for discharge.  Patient received maximum benefit from her inpatient stay and was cleared medically for discharge.  Her medications were sent to her pharmacy of choice.  Discharge instructions as well as return precautions were discussed with patient with good understanding.   Attending Attestation (For Attendings USE Only)...